# Patient Record
Sex: MALE | Race: BLACK OR AFRICAN AMERICAN | NOT HISPANIC OR LATINO | Employment: UNEMPLOYED | URBAN - METROPOLITAN AREA
[De-identification: names, ages, dates, MRNs, and addresses within clinical notes are randomized per-mention and may not be internally consistent; named-entity substitution may affect disease eponyms.]

---

## 2024-09-24 ENCOUNTER — APPOINTMENT (OUTPATIENT)
Dept: RADIOLOGY | Facility: HOSPITAL | Age: 74
End: 2024-09-24
Payer: COMMERCIAL

## 2024-09-24 ENCOUNTER — HOSPITAL ENCOUNTER (INPATIENT)
Facility: HOSPITAL | Age: 74
End: 2024-09-24
Attending: STUDENT IN AN ORGANIZED HEALTH CARE EDUCATION/TRAINING PROGRAM | Admitting: INTERNAL MEDICINE
Payer: COMMERCIAL

## 2024-09-24 DIAGNOSIS — F25.8 OTHER SCHIZOAFFECTIVE DISORDERS: ICD-10-CM

## 2024-09-24 DIAGNOSIS — T14.8XXA WOUND INFECTION: ICD-10-CM
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
## 2024-09-24 DIAGNOSIS — R78.81 GRAM-NEGATIVE BACTEREMIA: ICD-10-CM

## 2024-09-24 DIAGNOSIS — G47.00 INSOMNIA, UNSPECIFIED TYPE: ICD-10-CM

## 2024-09-24 DIAGNOSIS — E11.9 TYPE 2 DIABETES MELLITUS WITHOUT COMPLICATION, WITHOUT LONG-TERM CURRENT USE OF INSULIN (MULTI): ICD-10-CM

## 2024-09-24 DIAGNOSIS — I10 PRIMARY HYPERTENSION: ICD-10-CM

## 2024-09-24 DIAGNOSIS — N40.0 BENIGN PROSTATIC HYPERPLASIA, UNSPECIFIED WHETHER LOWER URINARY TRACT SYMPTOMS PRESENT: ICD-10-CM

## 2024-09-24 DIAGNOSIS — L08.9 WOUND INFECTION: ICD-10-CM

## 2024-09-24 DIAGNOSIS — K59.00 CONSTIPATION, UNSPECIFIED CONSTIPATION TYPE: ICD-10-CM

## 2024-09-24 DIAGNOSIS — S08.0XXA AVULSION OF SCALP, INITIAL ENCOUNTER: ICD-10-CM

## 2024-09-24 DIAGNOSIS — M54.9 BACK PAIN, UNSPECIFIED BACK LOCATION, UNSPECIFIED BACK PAIN LATERALITY, UNSPECIFIED CHRONICITY: ICD-10-CM

## 2024-09-24 LAB
ABO GROUP (TYPE) IN BLOOD: NORMAL
ABO GROUP (TYPE) IN BLOOD: NORMAL
ALBUMIN SERPL BCP-MCNC: 4.7 G/DL (ref 3.4–5)
ALBUMIN SERPL BCP-MCNC: 4.7 G/DL (ref 3.4–5)
ALP SERPL-CCNC: 74 U/L (ref 33–136)
ALP SERPL-CCNC: 74 U/L (ref 33–136)
ALT SERPL W P-5'-P-CCNC: 20 U/L (ref 10–52)
ALT SERPL W P-5'-P-CCNC: 20 U/L (ref 10–52)
ANION GAP SERPL CALC-SCNC: 21 MMOL/L (ref 10–20)
ANION GAP SERPL CALC-SCNC: 21 MMOL/L (ref 10–20)
ANTIBODY SCREEN: NORMAL
ANTIBODY SCREEN: NORMAL
AST SERPL W P-5'-P-CCNC: 52 U/L (ref 9–39)
AST SERPL W P-5'-P-CCNC: 52 U/L (ref 9–39)
BASOPHILS # BLD AUTO: 0.01 X10*3/UL (ref 0–0.1)
BASOPHILS # BLD AUTO: 0.01 X10*3/UL (ref 0–0.1)
BASOPHILS NFR BLD AUTO: 0.1 %
BASOPHILS NFR BLD AUTO: 0.1 %
BILIRUB SERPL-MCNC: 1 MG/DL (ref 0–1.2)
BILIRUB SERPL-MCNC: 1 MG/DL (ref 0–1.2)
BUN SERPL-MCNC: 12 MG/DL (ref 6–23)
BUN SERPL-MCNC: 12 MG/DL (ref 6–23)
CALCIUM SERPL-MCNC: 10.2 MG/DL (ref 8.6–10.6)
CALCIUM SERPL-MCNC: 10.2 MG/DL (ref 8.6–10.6)
CARDIAC TROPONIN I PNL SERPL HS: 101 NG/L (ref 0–53)
CARDIAC TROPONIN I PNL SERPL HS: 101 NG/L (ref 0–53)
CARDIAC TROPONIN I PNL SERPL HS: 83 NG/L (ref 0–53)
CARDIAC TROPONIN I PNL SERPL HS: 83 NG/L (ref 0–53)
CARDIAC TROPONIN I PNL SERPL HS: 90 NG/L (ref 0–53)
CARDIAC TROPONIN I PNL SERPL HS: 90 NG/L (ref 0–53)
CFT FORM KAOLIN IND BLD RES TEG: 1.4 MIN (ref 0.8–2.1)
CFT FORM KAOLIN IND BLD RES TEG: 1.4 MIN (ref 0.8–2.1)
CHLORIDE SERPL-SCNC: 96 MMOL/L (ref 98–107)
CHLORIDE SERPL-SCNC: 96 MMOL/L (ref 98–107)
CK SERPL-CCNC: 3189 U/L (ref 0–325)
CK SERPL-CCNC: 3189 U/L (ref 0–325)
CK SERPL-CCNC: 3593 U/L (ref 0–325)
CK SERPL-CCNC: 3593 U/L (ref 0–325)
CLOT ANGLE.KAOLIN INDUCED BLD RES TEG: 72.9 DEG (ref 63–78)
CLOT ANGLE.KAOLIN INDUCED BLD RES TEG: 72.9 DEG (ref 63–78)
CLOT INIT KAO IND P HEP NEUT BLD RES TEG: 5.5 MIN (ref 4.3–8.3)
CLOT INIT KAO IND P HEP NEUT BLD RES TEG: 5.5 MIN (ref 4.3–8.3)
CLOT INIT KAO IND P HEP NEUT BLD RES TEG: 6.2 MIN (ref 4.6–9.1)
CLOT INIT KAO IND P HEP NEUT BLD RES TEG: 6.2 MIN (ref 4.6–9.1)
CO2 SERPL-SCNC: 25 MMOL/L (ref 21–32)
CO2 SERPL-SCNC: 25 MMOL/L (ref 21–32)
CREAT SERPL-MCNC: 1.02 MG/DL (ref 0.5–1.3)
CREAT SERPL-MCNC: 1.02 MG/DL (ref 0.5–1.3)
EGFRCR SERPLBLD CKD-EPI 2021: 77 ML/MIN/1.73M*2
EGFRCR SERPLBLD CKD-EPI 2021: 77 ML/MIN/1.73M*2
EOSINOPHIL # BLD AUTO: 0.22 X10*3/UL (ref 0–0.4)
EOSINOPHIL # BLD AUTO: 0.22 X10*3/UL (ref 0–0.4)
EOSINOPHIL NFR BLD AUTO: 2.7 %
EOSINOPHIL NFR BLD AUTO: 2.7 %
ERYTHROCYTE [DISTWIDTH] IN BLOOD BY AUTOMATED COUNT: 13.2 % (ref 11.5–14.5)
ERYTHROCYTE [DISTWIDTH] IN BLOOD BY AUTOMATED COUNT: 13.2 % (ref 11.5–14.5)
EST. AVERAGE GLUCOSE BLD GHB EST-MCNC: 140 MG/DL
EST. AVERAGE GLUCOSE BLD GHB EST-MCNC: 140 MG/DL
ETHANOL SERPL-MCNC: <10 MG/DL
ETHANOL SERPL-MCNC: <10 MG/DL
FIBRINOGEN BLD CALC-MCNC: 361 MG/DL (ref 278–581)
FIBRINOGEN BLD CALC-MCNC: 361 MG/DL (ref 278–581)
FIBRINOGEN PPP-MCNC: 356 MG/DL (ref 200–400)
FIBRINOGEN PPP-MCNC: 356 MG/DL (ref 200–400)
GLUCOSE BLD MANUAL STRIP-MCNC: 150 MG/DL (ref 74–99)
GLUCOSE BLD MANUAL STRIP-MCNC: 150 MG/DL (ref 74–99)
GLUCOSE SERPL-MCNC: 158 MG/DL (ref 74–99)
GLUCOSE SERPL-MCNC: 158 MG/DL (ref 74–99)
HBA1C MFR BLD: 6.5 %
HBA1C MFR BLD: 6.5 %
HCT VFR BLD AUTO: 47.1 % (ref 41–52)
HCT VFR BLD AUTO: 47.1 % (ref 41–52)
HGB BLD-MCNC: 15.3 G/DL (ref 13.5–17.5)
HGB BLD-MCNC: 15.3 G/DL (ref 13.5–17.5)
IMM GRANULOCYTES # BLD AUTO: 0.03 X10*3/UL (ref 0–0.5)
IMM GRANULOCYTES # BLD AUTO: 0.03 X10*3/UL (ref 0–0.5)
IMM GRANULOCYTES NFR BLD AUTO: 0.4 % (ref 0–0.9)
IMM GRANULOCYTES NFR BLD AUTO: 0.4 % (ref 0–0.9)
INR PPP: 1.2 (ref 0.9–1.1)
INR PPP: 1.2 (ref 0.9–1.1)
LACTATE SERPL-SCNC: 2.3 MMOL/L (ref 0.4–2)
LACTATE SERPL-SCNC: 2.3 MMOL/L (ref 0.4–2)
LACTATE SERPL-SCNC: 2.9 MMOL/L (ref 0.4–2)
LACTATE SERPL-SCNC: 2.9 MMOL/L (ref 0.4–2)
LACTATE SERPL-SCNC: 6.9 MMOL/L (ref 0.4–2)
LACTATE SERPL-SCNC: 6.9 MMOL/L (ref 0.4–2)
LYMPHOCYTES # BLD AUTO: 0.55 X10*3/UL (ref 0.8–3)
LYMPHOCYTES # BLD AUTO: 0.55 X10*3/UL (ref 0.8–3)
LYMPHOCYTES NFR BLD AUTO: 6.7 %
LYMPHOCYTES NFR BLD AUTO: 6.7 %
MA KAOLIN BLD RES TEG: 55.9 MM (ref 52–69)
MA KAOLIN BLD RES TEG: 55.9 MM (ref 52–69)
MA KAOLIN+TF BLD RES TEG: 57.9 MM (ref 52–70)
MA KAOLIN+TF BLD RES TEG: 57.9 MM (ref 52–70)
MA TF IND+IIB-IIIA INH BLD RES TEG: 19.8 MM (ref 15–32)
MA TF IND+IIB-IIIA INH BLD RES TEG: 19.8 MM (ref 15–32)
MCH RBC QN AUTO: 27.9 PG (ref 26–34)
MCH RBC QN AUTO: 27.9 PG (ref 26–34)
MCHC RBC AUTO-ENTMCNC: 32.5 G/DL (ref 32–36)
MCHC RBC AUTO-ENTMCNC: 32.5 G/DL (ref 32–36)
MCV RBC AUTO: 86 FL (ref 80–100)
MCV RBC AUTO: 86 FL (ref 80–100)
MONOCYTES # BLD AUTO: 0.7 X10*3/UL (ref 0.05–0.8)
MONOCYTES # BLD AUTO: 0.7 X10*3/UL (ref 0.05–0.8)
MONOCYTES NFR BLD AUTO: 8.6 %
MONOCYTES NFR BLD AUTO: 8.6 %
NEUTROPHILS # BLD AUTO: 6.65 X10*3/UL (ref 1.6–5.5)
NEUTROPHILS # BLD AUTO: 6.65 X10*3/UL (ref 1.6–5.5)
NEUTROPHILS NFR BLD AUTO: 81.5 %
NEUTROPHILS NFR BLD AUTO: 81.5 %
NRBC BLD-RTO: 0 /100 WBCS (ref 0–0)
NRBC BLD-RTO: 0 /100 WBCS (ref 0–0)
PLATELET # BLD AUTO: 188 X10*3/UL (ref 150–450)
PLATELET # BLD AUTO: 188 X10*3/UL (ref 150–450)
POTASSIUM SERPL-SCNC: 4.8 MMOL/L (ref 3.5–5.3)
POTASSIUM SERPL-SCNC: 4.8 MMOL/L (ref 3.5–5.3)
PROT SERPL-MCNC: 8.8 G/DL (ref 6.4–8.2)
PROT SERPL-MCNC: 8.8 G/DL (ref 6.4–8.2)
PROTHROMBIN TIME: 13.9 SECONDS (ref 9.8–12.8)
PROTHROMBIN TIME: 13.9 SECONDS (ref 9.8–12.8)
RBC # BLD AUTO: 5.48 X10*6/UL (ref 4.5–5.9)
RBC # BLD AUTO: 5.48 X10*6/UL (ref 4.5–5.9)
RH FACTOR (ANTIGEN D): NORMAL
RH FACTOR (ANTIGEN D): NORMAL
SODIUM SERPL-SCNC: 137 MMOL/L (ref 136–145)
SODIUM SERPL-SCNC: 137 MMOL/L (ref 136–145)
WBC # BLD AUTO: 8.2 X10*3/UL (ref 4.4–11.3)
WBC # BLD AUTO: 8.2 X10*3/UL (ref 4.4–11.3)

## 2024-09-24 PROCEDURE — 99222 1ST HOSP IP/OBS MODERATE 55: CPT

## 2024-09-24 PROCEDURE — 82077 ASSAY SPEC XCP UR&BREATH IA: CPT | Performed by: STUDENT IN AN ORGANIZED HEALTH CARE EDUCATION/TRAINING PROGRAM

## 2024-09-24 PROCEDURE — 72125 CT NECK SPINE W/O DYE: CPT

## 2024-09-24 PROCEDURE — 36415 COLL VENOUS BLD VENIPUNCTURE: CPT | Performed by: STUDENT IN AN ORGANIZED HEALTH CARE EDUCATION/TRAINING PROGRAM

## 2024-09-24 PROCEDURE — 2500000004 HC RX 250 GENERAL PHARMACY W/ HCPCS (ALT 636 FOR OP/ED)

## 2024-09-24 PROCEDURE — 70450 CT HEAD/BRAIN W/O DYE: CPT

## 2024-09-24 PROCEDURE — 71045 X-RAY EXAM CHEST 1 VIEW: CPT

## 2024-09-24 PROCEDURE — 85025 COMPLETE CBC W/AUTO DIFF WBC: CPT | Performed by: STUDENT IN AN ORGANIZED HEALTH CARE EDUCATION/TRAINING PROGRAM

## 2024-09-24 PROCEDURE — 82947 ASSAY GLUCOSE BLOOD QUANT: CPT

## 2024-09-24 PROCEDURE — G0390 TRAUMA RESPONS W/HOSP CRITI: HCPCS

## 2024-09-24 PROCEDURE — 72170 X-RAY EXAM OF PELVIS: CPT

## 2024-09-24 PROCEDURE — 73130 X-RAY EXAM OF HAND: CPT | Mod: LT

## 2024-09-24 PROCEDURE — 83605 ASSAY OF LACTIC ACID: CPT

## 2024-09-24 PROCEDURE — 99291 CRITICAL CARE FIRST HOUR: CPT | Performed by: STUDENT IN AN ORGANIZED HEALTH CARE EDUCATION/TRAINING PROGRAM

## 2024-09-24 PROCEDURE — 73110 X-RAY EXAM OF WRIST: CPT | Mod: LT

## 2024-09-24 PROCEDURE — 85384 FIBRINOGEN ACTIVITY: CPT | Performed by: STUDENT IN AN ORGANIZED HEALTH CARE EDUCATION/TRAINING PROGRAM

## 2024-09-24 PROCEDURE — 80053 COMPREHEN METABOLIC PANEL: CPT | Performed by: STUDENT IN AN ORGANIZED HEALTH CARE EDUCATION/TRAINING PROGRAM

## 2024-09-24 PROCEDURE — 36600 WITHDRAWAL OF ARTERIAL BLOOD: CPT | Performed by: STUDENT IN AN ORGANIZED HEALTH CARE EDUCATION/TRAINING PROGRAM

## 2024-09-24 PROCEDURE — 82550 ASSAY OF CK (CPK): CPT | Performed by: STUDENT IN AN ORGANIZED HEALTH CARE EDUCATION/TRAINING PROGRAM

## 2024-09-24 PROCEDURE — 74176 CT ABD & PELVIS W/O CONTRAST: CPT

## 2024-09-24 PROCEDURE — 84484 ASSAY OF TROPONIN QUANT: CPT | Performed by: STUDENT IN AN ORGANIZED HEALTH CARE EDUCATION/TRAINING PROGRAM

## 2024-09-24 PROCEDURE — 96360 HYDRATION IV INFUSION INIT: CPT

## 2024-09-24 PROCEDURE — 86901 BLOOD TYPING SEROLOGIC RH(D): CPT | Performed by: STUDENT IN AN ORGANIZED HEALTH CARE EDUCATION/TRAINING PROGRAM

## 2024-09-24 PROCEDURE — 2550000001 HC RX 255 CONTRASTS: Performed by: STUDENT IN AN ORGANIZED HEALTH CARE EDUCATION/TRAINING PROGRAM

## 2024-09-24 PROCEDURE — 85347 COAGULATION TIME ACTIVATED: CPT | Performed by: STUDENT IN AN ORGANIZED HEALTH CARE EDUCATION/TRAINING PROGRAM

## 2024-09-24 PROCEDURE — 82550 ASSAY OF CK (CPK): CPT

## 2024-09-24 PROCEDURE — 84484 ASSAY OF TROPONIN QUANT: CPT

## 2024-09-24 PROCEDURE — 2500000002 HC RX 250 W HCPCS SELF ADMINISTERED DRUGS (ALT 637 FOR MEDICARE OP, ALT 636 FOR OP/ED)

## 2024-09-24 PROCEDURE — 99291 CRITICAL CARE FIRST HOUR: CPT | Mod: 25 | Performed by: STUDENT IN AN ORGANIZED HEALTH CARE EDUCATION/TRAINING PROGRAM

## 2024-09-24 PROCEDURE — 72131 CT LUMBAR SPINE W/O DYE: CPT | Mod: RCN

## 2024-09-24 PROCEDURE — 36415 COLL VENOUS BLD VENIPUNCTURE: CPT

## 2024-09-24 PROCEDURE — 2500000004 HC RX 250 GENERAL PHARMACY W/ HCPCS (ALT 636 FOR OP/ED): Performed by: NURSE ANESTHETIST, CERTIFIED REGISTERED

## 2024-09-24 PROCEDURE — 1200000002 HC GENERAL ROOM WITH TELEMETRY DAILY

## 2024-09-24 PROCEDURE — 83605 ASSAY OF LACTIC ACID: CPT | Performed by: STUDENT IN AN ORGANIZED HEALTH CARE EDUCATION/TRAINING PROGRAM

## 2024-09-24 PROCEDURE — 83036 HEMOGLOBIN GLYCOSYLATED A1C: CPT

## 2024-09-24 PROCEDURE — 2500000001 HC RX 250 WO HCPCS SELF ADMINISTERED DRUGS (ALT 637 FOR MEDICARE OP)

## 2024-09-24 PROCEDURE — 72128 CT CHEST SPINE W/O DYE: CPT | Mod: RCN

## 2024-09-24 PROCEDURE — 96361 HYDRATE IV INFUSION ADD-ON: CPT

## 2024-09-24 PROCEDURE — 85610 PROTHROMBIN TIME: CPT | Performed by: STUDENT IN AN ORGANIZED HEALTH CARE EDUCATION/TRAINING PROGRAM

## 2024-09-24 RX ORDER — ASPIRIN 81 MG/1
81 TABLET ORAL DAILY
Status: DISCONTINUED | OUTPATIENT
Start: 2024-09-24 | End: 2024-10-05 | Stop reason: HOSPADM

## 2024-09-24 RX ORDER — ACETAMINOPHEN 325 MG/1
975 TABLET ORAL ONCE
Status: COMPLETED | OUTPATIENT
Start: 2024-09-24 | End: 2024-09-24

## 2024-09-24 RX ORDER — INSULIN LISPRO 100 [IU]/ML
0-5 INJECTION, SOLUTION INTRAVENOUS; SUBCUTANEOUS
Status: DISCONTINUED | OUTPATIENT
Start: 2024-09-24 | End: 2024-10-05 | Stop reason: HOSPADM

## 2024-09-24 RX ORDER — DEXTROSE 50 % IN WATER (D50W) INTRAVENOUS SYRINGE
25
Status: DISCONTINUED | OUTPATIENT
Start: 2024-09-24 | End: 2024-10-05 | Stop reason: HOSPADM

## 2024-09-24 RX ORDER — TRAZODONE HYDROCHLORIDE 50 MG/1
100 TABLET ORAL NIGHTLY
Status: DISCONTINUED | OUTPATIENT
Start: 2024-09-24 | End: 2024-10-05 | Stop reason: HOSPADM

## 2024-09-24 RX ORDER — HYDRALAZINE HYDROCHLORIDE 10 MG/1
10 TABLET, FILM COATED ORAL ONCE
Status: COMPLETED | OUTPATIENT
Start: 2024-09-24 | End: 2024-09-24

## 2024-09-24 RX ORDER — POLYETHYLENE GLYCOL 3350 17 G/17G
17 POWDER, FOR SOLUTION ORAL DAILY
Status: DISCONTINUED | OUTPATIENT
Start: 2024-09-24 | End: 2024-10-05 | Stop reason: HOSPADM

## 2024-09-24 RX ORDER — DEXTROSE 50 % IN WATER (D50W) INTRAVENOUS SYRINGE
12.5
Status: DISCONTINUED | OUTPATIENT
Start: 2024-09-24 | End: 2024-10-05 | Stop reason: HOSPADM

## 2024-09-24 RX ORDER — TAMSULOSIN HYDROCHLORIDE 0.4 MG/1
0.4 CAPSULE ORAL DAILY
Status: DISCONTINUED | OUTPATIENT
Start: 2024-09-24 | End: 2024-10-05 | Stop reason: HOSPADM

## 2024-09-24 RX ORDER — SODIUM CHLORIDE 9 MG/ML
100 INJECTION, SOLUTION INTRAVENOUS CONTINUOUS
Status: DISCONTINUED | OUTPATIENT
Start: 2024-09-24 | End: 2024-09-30

## 2024-09-24 RX ORDER — ENOXAPARIN SODIUM 100 MG/ML
40 INJECTION SUBCUTANEOUS EVERY 24 HOURS
Status: DISCONTINUED | OUTPATIENT
Start: 2024-09-24 | End: 2024-10-05 | Stop reason: HOSPADM

## 2024-09-24 RX ORDER — OXYCODONE HYDROCHLORIDE 5 MG/1
5 TABLET ORAL EVERY 4 HOURS PRN
Status: DISCONTINUED | OUTPATIENT
Start: 2024-09-24 | End: 2024-09-28

## 2024-09-24 RX ORDER — ACETAMINOPHEN 325 MG/1
650 TABLET ORAL EVERY 8 HOURS PRN
Status: DISCONTINUED | OUTPATIENT
Start: 2024-09-24 | End: 2024-10-05 | Stop reason: HOSPADM

## 2024-09-24 ASSESSMENT — ENCOUNTER SYMPTOMS
DIZZINESS: 0
DIFFICULTY URINATING: 0
FEVER: 0
NAUSEA: 0
FATIGUE: 0
FLANK PAIN: 0
EYE PAIN: 0
VOMITING: 0
HEMATURIA: 0
ABDOMINAL PAIN: 0
SORE THROAT: 0
NERVOUS/ANXIOUS: 0
ACTIVITY CHANGE: 0
HEADACHES: 0
WOUND: 0
COUGH: 0
PHOTOPHOBIA: 0
NECK PAIN: 0
SHORTNESS OF BREATH: 0
NECK STIFFNESS: 0

## 2024-09-24 ASSESSMENT — LIFESTYLE VARIABLES
TOTAL SCORE: 0
EVER HAD A DRINK FIRST THING IN THE MORNING TO STEADY YOUR NERVES TO GET RID OF A HANGOVER: NO
HAVE YOU EVER FELT YOU SHOULD CUT DOWN ON YOUR DRINKING: NO
HAVE PEOPLE ANNOYED YOU BY CRITICIZING YOUR DRINKING: NO
EVER FELT BAD OR GUILTY ABOUT YOUR DRINKING: NO

## 2024-09-24 ASSESSMENT — PAIN SCALES - GENERAL
PAINLEVEL_OUTOF10: 8
PAINLEVEL_OUTOF10: 6
PAINLEVEL_OUTOF10: 7

## 2024-09-24 ASSESSMENT — COLUMBIA-SUICIDE SEVERITY RATING SCALE - C-SSRS
6. HAVE YOU EVER DONE ANYTHING, STARTED TO DO ANYTHING, OR PREPARED TO DO ANYTHING TO END YOUR LIFE?: NO
1. IN THE PAST MONTH, HAVE YOU WISHED YOU WERE DEAD OR WISHED YOU COULD GO TO SLEEP AND NOT WAKE UP?: NO
2. HAVE YOU ACTUALLY HAD ANY THOUGHTS OF KILLING YOURSELF?: NO

## 2024-09-24 ASSESSMENT — PAIN - FUNCTIONAL ASSESSMENT
PAIN_FUNCTIONAL_ASSESSMENT: 0-10
PAIN_FUNCTIONAL_ASSESSMENT: 0-10

## 2024-09-24 NOTE — ED PROCEDURE NOTE
Procedure  Critical Care    Performed by: Mina Covington MD  Authorized by: Mina Covington MD    Critical care provider statement:     Critical care time (minutes):  35    Critical care time was exclusive of:  Teaching time and separately billable procedures and treating other patients    Critical care was necessary to treat or prevent imminent or life-threatening deterioration of the following conditions:  CNS failure or compromise and trauma    Critical care was time spent personally by me on the following activities:  Blood draw for specimens, ordering and performing treatments and interventions, ordering and review of radiographic studies, ordering and review of laboratory studies, pulse oximetry, re-evaluation of patient's condition, review of old charts, development of treatment plan with patient or surrogate, discussions with consultants, evaluation of patient's response to treatment and examination of patient  Comments:      Patient activated as a full trauma due to concern for depressed skull fracture.  Required panCT and discussion with trauma team.               Mina Covington MD  09/24/24 2422

## 2024-09-24 NOTE — ED TRIAGE NOTES
Pt arrives via [facility] after he had an unwitnessed fall at home yesterday. The pt had been laying on the ground since last night at 9 pm and he was unable to get up. A neighbor found him on the ground this morning altered with a obvious deformity to the posterior skull. Pt is not currently on any blood thinners.

## 2024-09-24 NOTE — ED PROVIDER NOTES
Emergency Department Provider Note        History of Present Illness     History provided by: Patient and EMS  Limitations to History: Trauma Activation    HPI:  Onehundredseventeen Trauma Riaz is a 74 y.o. male presenting to the ED as a Full Trauma Activation after fall. Patient reports that he fell off of a table. Patient had an unwitnessed fall the night before and was found laying on the ground. Patient was found down and appeared to have a obvious posterior scalp deformity. Patient was not on blood thinners.     Physical Exam   Arrival Vitals:  T 36.4 °C (97.6 °F)  HR 97  /82  RR 14  O2 95 % None (Room air)    Primary Survey:  Airway: Intact & patent  Breathing: Equal breath sounds bilaterally  Circulation: 2+ radial and DP pulses bilaterally  Disability: GCS 15.  Gross motor and sensation intact in the bilateral upper and lower extremities.  Exposure: Patient fully exposed, warm blankets applied    Secondary Survey:    Head: Depressed posterior scalp. No cephalohematoma.  Eye: Pupils equal, round, and reactive to light. Gaze is conjugate. No orbital ridge bony step-offs, or tenderness.  ENT:   Midface is stable.  No mandibular tenderness or dental malocclusion's.  There is no nasal bone tenderness or deformity.  No epistaxis.  No blood or CSF drainage and external auditory canals.  No intraoral lesions.  Neck: Cervical collar in place. There is no C-spine midline tenderness to palpation, step-offs, or deformities.  Trachea is midline.  Chest: Clear to auscultation bilaterally, no chest wall tenderness palpation, crepitus, flail segments noted.  No bruising or abrasions noted to the anterior chest wall.  Cardiovascular: Regular rate, rhythm  Abdomen: Soft, nontender, nondistended.  No bruising or lacerations noted.  Not peritonitic.  Pelvis: Stable to compression  : Normal external genitalia, no blood at the urethral meatus  Back/spine: No midline T or L-spine tenderness palpation, step-offs, or  deformities.  No lacerations, abrasions, or bruising noted.  Extremities: No gross bony deformities, no bony tenderness palpation.  Full range of motion all in 4 extremities.  Skin: Abrasions to the left shoulder.  Neuro: Alert and oriented to person, place, time.  Face symmetric, speech fluent.  Gross motor and sensory function intact in the bilateral upper and lower extremities.    Medical Decision Making & ED Course   Medical Decision Makin y.o. male presenting to the ED as a Full Trauma Activation after being found down.  On arrival to the ED, the patient was immediately brought to the resuscitation bay.  Patient was seen and evaluated with trauma surgery at the bedside. Patient was placed on the monitors. IV access was obtained. Patient was placed in a c collar in place. Primary and secondary survey were notable for abrasions to the left shoulder    Chest xray was obtained in the trauma bay and was independently reviewed which showed no pneumothorax.  Pelvis xray was obtained in the trauma bay and was independently reviewed which showed acute fracture.  Patient obtain lab work including a CK given the time down.  Patient had a CT imaging of the head as well as pan scan which were negative for traumatic injury.  Patient had x-rays of the left hand and wrist as well because of the pain.  Trauma surgery signed off of the patient.  Patient was given a liter of fluids had improvement in the lactate.  Given the elevated creatinine and elevated lactate in setting of dehydration and need for PT OT.  Patient will be admitted to medicine.    ----      EKG Independent Interpretation: EKG not obtained    Independent Result Review and Interpretation: Relevant laboratory and radiographic results were reviewed and independently interpreted by myself.  As necessary, they are commented on in the ED Course.    Social Determinants of Health which Significantly Impact Care: None identified     Chronic conditions affecting  the patient's care: As documented above in [facility]    The patient was discussed with the following consultants/services: Trauma Surgery regarding management and disposition    Care Considerations: As documented above in [facility]    ED Course:  ED Course as of 09/24/24 1554   Tue Sep 24, 2024   1055 XR pelvis 1-2 views  No acute fracture on my interpretation [FN]   1056 XR chest 1 view  On my interpretation chest x-ray shows no pneumothorax or hemothorax.  Trachea is midline. [FN]   1300 Creatinine: 1.02 [FN]      ED Course User Index  [FN] Mina Covington MD         Diagnoses as of 09/24/24 1554   Fall, initial encounter       Disposition   As a result of their workup, the patient will require admission to the hospital.  The patient was informed of his diagnosis.  The patient was given the opportunity to ask questions and I answered them. The patient agreed to be admitted to the hospital.    Procedures   Procedures    Patient seen and discussed with ED attending physician.    Deborah Martinez MD  Emergency Medicine     Deborah Martinez MD  Resident  09/24/24 8401

## 2024-09-24 NOTE — PROGRESS NOTES
Attending Attestation Note    Onehundredseventeen Trauma Riaz is a 74 y.o. male who history of hypertension, hyperlipidemia, diabetes presenting with fall yesterday at 9 PM.  Standing on a table, fell down hitting the back of his head.  Found on the ground by his roommate today.  Recalls the entire incident and denies LOC.  EMS noted the patient does have a depression of the back of the skull.  No preceding lightheadedness, chest pain, shortness of breath.  No significant complaints right now.  Says he is not sure why he could not get up    Exam  Visit Vitals  /82   Pulse 96   Temp 36.4 °C (97.6 °F)   Resp 20     NAD, vital signs stable  Posterior skull depression, abrasion over the left face, no facial bone tenderness  No C/T/L-spine tenderness, step-off, deformity.  Arrived in c-collar in place  No chest wall or abdominal tenderness  Stable pelvis  No extremity deformity, pulses intact 4 extremities, abrasion on the left anterior tibia   GCS 15 following commands alert and oriented x 3    MDM  74-year-old male presenting with fall with posterior skull depression.  AAOx3 right now.  Recalls the entire incident.  Appears to be a mechanical fall however given significant mechanism of injury full trauma was activated.  Plan for pan scan to evaluate for other injuries.  Will get CT angio of the head and neck given concern for skull fracture and likely intracranial bleeding  Rule out electrolyte/metabolic/renal abnormalities, anemia, leukocytosis  Will check CPK for rhabdomyolysis and give empiric IV fluids  Does not recall last tetanus we will update  Wound care for abrasions        MDM Complexity    History provided by: Patient  Limitations to History: None  External Records Reviewed: Most recent office visit/DC sum    Social Determinants of Health which Significantly Impact Care:   Was unable to get help overnight when he fell   Social Determinants of Health     Tobacco Use: Not on file   Alcohol Use: Not on  file   Financial Resource Strain: Not on file   Food Insecurity: Not on file   Transportation Needs: Not on file   Physical Activity: Not on file   Stress: Not on file   Social Connections: Not on file   Depression: Not on file   Housing Stability: Not on file   Utilities: Not on file   Digital Equity: Not on file   Health Literacy: Not on file       EKG Independent Interpretation: See ED Course  Independent Result Review and Interpretation: See ED course  Chronic conditions affecting the patient's care: As documented in HPI, MDM, and ED course  The patient was discussed with the following consultants/services: trauma surgery, see ed course         Mina Covington MD  Emergency Medicine

## 2024-09-24 NOTE — H&P
"MEDICINE ADMISSION NOTE    History of Present Illness  Onehundredseventeen Trauma Riaz ([place]) is a 74 y.o. male with PMHx of HTN, HLD, DM, cirrhosis 2/2 treated Hep C (Tx 6442-9792), BPH, schizoaffective disorder and prostate CA s/p EBRT presenting to the ED s/p mechanical fall leading to trauma activation. Pt reports he was standing on a table when he fell backwards hitting his head last night around 9 PM.  Patient was unable to get up on his own was found down by his roommate this morning. Pt reports being down for 12 hours and was unable to get up on his own.  The patient denies any loss of consciousness, lightheadedness or dizziness prior to the fall. Reports having pain in his back and his head.  Patient noted to have an obvious deformity in the back of his skull.  His lip is also swollen. Patient without any signs of confusion and able to recall the fall.      Patient denies any fever, chills, shortness of breath, chest pain, abdominal pain, N/V/C/D, lower extremity pain/swelling.    ED Course:  BP (!) 164/104   Pulse 98   Temp 36.4 °C (97.6 °F)   Resp 16   Ht 1.803 m (5' 11\")   Wt 94.3 kg (208 lb)   SpO2 95%   BMI 29.01 kg/m²      Labs:  Labs Reviewed   CBC WITH AUTO DIFFERENTIAL - Abnormal       Result Value    WBC 8.2      nRBC 0.0      RBC 5.48      Hemoglobin 15.3      Hematocrit 47.1      MCV 86      MCH 27.9      MCHC 32.5      RDW 13.2      Platelets 188      Neutrophils % 81.5      Immature Granulocytes %, Automated 0.4      Lymphocytes % 6.7      Monocytes % 8.6      Eosinophils % 2.7      Basophils % 0.1      Neutrophils Absolute 6.65 (*)     Immature Granulocytes Absolute, Automated 0.03      Lymphocytes Absolute 0.55 (*)     Monocytes Absolute 0.70      Eosinophils Absolute 0.22      Basophils Absolute 0.01     COMPREHENSIVE METABOLIC PANEL - Abnormal    Glucose 158 (*)     Sodium 137      Potassium 4.8      Chloride 96 (*)     Bicarbonate 25      Anion Gap 21 (*)     Urea " Nitrogen 12      Creatinine 1.02      eGFR 77      Calcium 10.2      Albumin 4.7      Alkaline Phosphatase 74      Total Protein 8.8 (*)     AST 52 (*)     Bilirubin, Total 1.0      ALT 20     LACTATE - Abnormal    Lactate 6.9 (*)     Narrative:     Venipuncture immediately after or during the administration of Metamizole may lead to falsely low results. Testing should be performed immediately prior to Metamizole dosing.   PROTIME-INR - Abnormal    Protime 13.9 (*)     INR 1.2 (*)    TROPONIN I, HIGH SENSITIVITY - Abnormal    Troponin I, High Sensitivity (CMC) 83 (*)     Narrative:     Less than 99th percentile of normal range cutoff-  Female and children under 18 years old <35 ng/L; Male <54 ng/L: Negative  Repeat testing should be performed if clinically indicated.     Female and children under 18 years old  ng/L; Male  ng/L:  Consistent with possible cardiac damage and possible increased clinical   risk. Serial measurements may help to assess extent of myocardial damage.     >120 ng/L: Consistent with cardiac damage, increased clinical risk and  myocardial infarction. Serial measurements may help assess extent of   myocardial damage.      NOTE: Children less than 1 year old may have higher baseline troponin   levels and results should be interpreted in conjunction with the overall   clinical context.    NOTE: Troponin I testing is performed using a different   testing methodology at [facility] than at other   [facility]. Direct result comparisons should only   be made within the same method.     CREATINE KINASE - Abnormal    Creatine Kinase 3,189 (*)    LACTATE - Abnormal    Lactate 2.9 (*)     Narrative:     Venipuncture immediately after or during the administration of Metamizole may lead to falsely low results. Testing should be performed immediately prior to Metamizole dosing.   ALCOHOL - Normal    Alcohol <10     FIBRINOGEN - Normal    Fibrinogen 356     THROMBOELASTOGRAPH  CLOTTING GLOBAL PROFILE - Normal    R (Reaction Time) K 6.2      K (Clot Kinetics) 1.4      ANGLE 72.9      MA (Max Amplitude) K 55.9      R (Reaction Time) KH 5.5      MA (Max Amplitude) RT 57.9      MA ( Rachel Amplitude) FF 19.8      FLEV 361     TYPE AND SCREEN    ABO TYPE B      Rh TYPE POS      ANTIBODY SCREEN NEG     FIBRINOGEN   TROPONIN I, HIGH SENSITIVITY   LACTATE          Imaging:  XR wrist left 3+ views   Final Result   No definitive fracture.             MACRO:   None        Signed by: Doc Smith 9/24/2024 2:47 PM   Dictation workstation:   [id number]      XR hand left 3+ views   Final Result   No definitive fracture.             MACRO:   None        Signed by: Doc Smith 9/24/2024 2:39 PM   Dictation workstation:   [id number]      CT thoracic spine wo IV contrast   Final Result   No acute intracranial abnormality or calvarial fracture was   identified with significant scalp edema on a posttraumatic basis. No   acute fracture or posttraumatic subluxation of the vertebral column   was identified with findings as above.        MACRO:   None        Signed by: Mazin Jaimes 9/24/2024 11:39 AM   Dictation workstation:   [id number]      CT lumbar spine wo IV contrast   Final Result   No acute intracranial abnormality or calvarial fracture was   identified with significant scalp edema on a posttraumatic basis. No   acute fracture or posttraumatic subluxation of the vertebral column   was identified with findings as above.        MACRO:   None        Signed by: Mazin Jaimes 9/24/2024 11:39 AM   Dictation workstation:   [id number]      CT chest abdomen pelvis wo IV contrast   Final Result   1. No evidence of acute traumatic injury given noncontrast technique.   2. Probable renal cysts are incompletely assessed in the current   study. Further characterization by ultrasound on a routine outpatient   basis is recommended.        Signed by: Jericho Bearden 9/24/2024 11:37 AM   Dictation workstation:    [id number]      CT cervical spine wo IV contrast   Final Result   No acute intracranial abnormality or calvarial fracture was   identified with significant scalp edema on a posttraumatic basis. No   acute fracture or posttraumatic subluxation of the vertebral column   was identified with findings as above.        MACRO:   None        Signed by: Mazin Jaimes 9/24/2024 11:39 AM   Dictation workstation:   [id number]      CT head W O contrast trauma protocol   Final Result   No acute intracranial abnormality or calvarial fracture was   identified with significant scalp edema on a posttraumatic basis. No   acute fracture or posttraumatic subluxation of the vertebral column   was identified with findings as above.        MACRO:   None        Signed by: Mazin Jaimes 9/24/2024 11:39 AM   Dictation workstation:   [id number]      XR chest 1 view   Final Result   1. Prominent bilateral bronchovascular markings. Otherwise, no acute   cardiopulmonary process.        I personally reviewed the images/study and I agree with the findings   as stated by Rachid Calderon MD. This study was interpreted at   [place], [place].        MACRO:   None        Signed by: Doc Smith 9/24/2024 11:15 AM   Dictation workstation:   [id number]      XR pelvis 1-2 views   Final Result   No evidence of acute fracture or dislocation.        I personally reviewed the images/study and I agree with the findings   as stated by Rachid Calderon MD. This study was interpreted at   [place], [place].        Signed by: Doc Smith 9/24/2024 11:13 AM   Dictation workstation:   [id number]           ED Interventions:  Medications   iohexol (OMNIPaque) 350 mg iodine/mL solution 150 mL (150 mL intravenous Not Given 9/24/24 1052)   sodium chloride 0.9% infusion (50 mL/hr intravenous New Bag 9/24/24 1335)   sodium chloride 0.9 % bolus (1,000 mL intravenous New Bag 9/24/24  1052)   acetaminophen (Tylenol) tablet 975 mg (975 mg oral Given 9/24/24 1332)     Past Medical History  No past medical history on file.    Surgical History  No past surgical history on file.    Social History  He has no history on file for tobacco use, alcohol use, and drug use.    Family History  No family history on file.     Allergies  Patient has no known allergies.    Home Medications   No current outpatient medications      Objective   Visit Vitals  BP (!) 164/104   Pulse 98   Temp 36.4 °C (97.6 °F)   Resp 16      Physical Exam   Constitutional: No acute distress, cooperative  HEENT: NC/AT, EOMI, swelling in the lower lip, pressure wound deformity in back of skull   Cardiovascular: RRR, normal S1/S2  Pulmonary: Clear to auscultation b/l, no increased work of breathing  GI: Soft, non-tender, non-distended, normoactive bowel sounds  Lower extremities: Warm and well perfused, no lower extremity edema  Neuro: A&O x3, able to move all 4 extremities spontaneously, no focal deficits   Psych: Appropriate mood and affect      Medications  Scheduled medications  iohexol, 150 mL, intravenous, Once in imaging      Continuous medications  sodium chloride 0.9%, 50 mL/hr, Last Rate: 50 mL/hr (09/24/24 1335)      PRN medications        Assessment/Plan   Onehundredseventeen Trauma Riaz ([place]) is a 74 y.o. male with PMHx of HTN, HLD, DM, cirrhosis 2/2 treated Hep C (Tx 5075-5278), BPH, schizoaffective disorder and prostate CA s/p EBRT presenting to the s/p mechanical fall leading to trauma activation.  In the ED, extensive imaging was completed and resulted negative for any acute fractures or intracranial bleeding.  Labs notable for CK elevated to 3189, lactate elevated to 6.9 and down trended to 2.9 status post IV fluids.  Patient likely having rhabdomyolysis after being found down for an extensive duration s/p mechanical fall. Will trend CK, lactate, and continue with IVF. Will consult PT/OT to assess  functional status.     #s/p Mechanical Fall  #Skull deformity   ::fell last night and was down all night until found by his roommate  ::Trauma activated in the ED, extensive imaging completed and neg for acute findings   ::AAO x3 with no focal deficits  ::Trop 83 -> 90, ddx demand ischemia vs Type II MI   -Trauma signed off    -Tylenlol and oxycodone 5 mg for pain   -Will consult PT/OT to assess functional status and consideration for SNF placement   -Trend Trop to peak   -Consult wound care in the AM   -CTM     #c/f rhabdomyolysis   ::CK elevated to 3,189, lactate 6.9 -> 2.9 s/p IVF   -C/w NS at 50 ml/hr   -Trend CK and lactate     #HTN   #HLD  ::On lovastatin, triamterene-hydroCHLOROthiazide 37.5-25 mg daily, and ASA 81mg   -C/w ASA 81 mg daily   -Holding statin iso c/f rhabo   -Will give Hydral 10mg once for hypertension     #DM   -Check A1c  -Holding home Metformin 500mg BID   -Start SSI #1    #Schizoaffective disorder  #MDD   #insomnia   ::On propanolol 10 mg, Risperidone 2.5 mg tablet and trazodone 100 mg tablet   -C/w Risperidone 2.5 mg and Trazodone 100mg     #BPH  -C/w Tamsulosin     F : PRN  E: PRN  N: regular     DVT prophylaxis: Lovenox subq    Code Status: full code (confirmed on admission)   NOK: Extended Emergency Contact Information  Primary Emergency Contact: DESIREE GARIBAY  Mobile Phone: [phone]  Relation: Brother        Guerline Hoffman MD  Internal Medicine, PGY-3

## 2024-09-24 NOTE — H&P
[facility]  TRAUMA SERVICE - HISTORY AND PHYSICAL / CONSULT    Patient Name: Dilcia Mello  MRN: [id number]  Admit Date: 924  : 1950  AGE: 74 y.o.   GENDER: male  ==============================================================================  MECHANISM OF INJURY / CHIEF COMPLAINT:   Patient is an approximately 74 YOM who arrived as a full trauma activation after reported fall with +head strike and deformity of the skull.  Reportedly standing on table to reach a circuit breaker when he fell. On arrival patient is GCS 15, A&Ox4, reportedly remained down since 9pm  until he was found by his roommate.  LOC (yes/no?): Denies  Anticoagulant / Anti-platelet Rx? (for what dx?): Denies  Referring Facility Name (N/A for scene EMR run): N/A    INJURIES:   Fall    OTHER MEDICAL PROBLEMS:  HTN  HLD  DM    INCIDENTAL FINDINGS:  -    ==============================================================================  ADMISSION PLAN OF CARE:  Fall  No traumatic injuries on CT pan scan    Dispo: Given no traumatic injuries, trauma service will sign off at this time. Dispo per ED    Consultants notified (specialty, provider name, time): None    Patient seen and discussed with Dr. Cagle,    Maria T Palomo, CNP  Trauma Surgery  Floor: [id number] TICU: [id number]  Pager: [phone]    Total face to face time spent with patient of 35 minutes, with >50% of the time spent discussing plan of care/management, counseling/educating on disease processes, explaining results of diagnostic testing.      ==============================================================================  PAST MEDICAL HISTORY:   PMH:   No past medical history on file.      PSH:   No past surgical history on file.  FH:   No family history on file.  SOCIAL HISTORY:    Smoking:    Social History     Tobacco Use   Smoking Status Not on file   Smokeless Tobacco Not on file       Alcohol:    Social History     Substance  and Sexual Activity   Alcohol Use Not on file       Drug use: Denies illicit drug use    MEDICATIONS:   Prior to Admission medications    Not on File     ALLERGIES:   No Known Allergies    REVIEW OF SYSTEMS:  Review of Systems   Constitutional:  Negative for activity change, fatigue and fever.   HENT:  Negative for congestion, hearing loss and sore throat.    Eyes:  Negative for photophobia, pain and visual disturbance.   Respiratory:  Negative for cough and shortness of breath.    Cardiovascular:  Negative for chest pain.   Gastrointestinal:  Negative for abdominal pain, nausea and vomiting.   Genitourinary:  Negative for difficulty urinating, flank pain and hematuria.   Musculoskeletal:  Negative for neck pain and neck stiffness.   Skin:  Negative for wound.   Neurological:  Negative for dizziness, syncope and headaches.   Psychiatric/Behavioral:  Negative for self-injury and suicidal ideas. The patient is not nervous/anxious.      PHYSICAL EXAM:  PRIMARY SURVEY:  Airway  Airway is patent.     Breathing  Breathing is normal. Right breath sounds are normal. Left breath sounds are normal.     Circulation  Cardiac rhythm is regular. Rate is regular. There is no murmur present. There is no pericardial rub present.   Pulses  Radial: 2+ on the right; 2+ on the left.  Femoral: 2+ on the right; 2+ on the left.  Pedal: 2+ on the right; 2+ on the left.    Disability  [place] Coma Score  Eye:4   Verbal:5   Motor:6      15  Pupils  Right Pupil:   round and reactive      3 mm  Left Pupil:   round and reactive      3 mm     Motor Strength   strength:  5/5 on the right  5/5 on the left  Dorsiflex strength:  5/5 on the right  5/5 on the left  Plantarflex strength:  5/5 on the right  5/5 on the left  The patient does not have a sensory deficit.       SECONDARY SURVEY/PHYSICAL EXAM:  Physical Exam  HENT:      Head:      Comments: Depression of the occiput, no obvious laceration or abrasion     Nose: Nose normal.       Mouth/Throat:      Mouth: Mucous membranes are dry.   Eyes:      Conjunctiva/sclera: Conjunctivae normal.   Neck:      Comments: C collar in place, no step offs or deformities on exam  Cardiovascular:      Rate and Rhythm: Normal rate.      Pulses: Normal pulses.   Pulmonary:      Effort: Pulmonary effort is normal.   Abdominal:      Palpations: Abdomen is soft.   Musculoskeletal:         General: Normal range of motion.   Skin:     General: Skin is warm.      Capillary Refill: Capillary refill takes less than 2 seconds.      Comments: Superficial abrasion noted to right shoulder, superficial abrasion to luciano surface of left hand   Neurological:      Mental Status: He is oriented to person, place, and time.       IMAGING SUMMARY:  (summary of findings, not a copy of dictation)  CT Head/Face: No acute intracranial abnormality or calvarial fracture was identified with significant scalp edema on a posttraumatic basis.   CT C-Spine: No acute fracture or posttraumatic subluxation of the vertebral column was identified with findings as above.   CT Chest/Abd/Pelvis: No evidence of acute traumatic injury given noncontrast technique.   CXR/PXR: Prominent bilateral bronchovascular markings. Otherwise, no acute cardiopulmonary process.   Other(s):     LABS:  Results from last 7 days   Lab Units 09/24/24  1052   WBC AUTO x10*3/uL 8.2   HEMOGLOBIN g/dL 15.3   HEMATOCRIT % 47.1   PLATELETS AUTO x10*3/uL 188   NEUTROS PCT AUTO % 81.5   LYMPHS PCT AUTO % 6.7   MONOS PCT AUTO % 8.6   EOS PCT AUTO % 2.7     Results from last 7 days   Lab Units 09/24/24  1052   INR  1.2*     Results from last 7 days   Lab Units 09/24/24  1052   SODIUM mmol/L 137   POTASSIUM mmol/L 4.8   CHLORIDE mmol/L 96*   CO2 mmol/L 25   BUN mg/dL 12   CREATININE mg/dL 1.02   CALCIUM mg/dL 10.2   PROTEIN TOTAL g/dL 8.8*   BILIRUBIN TOTAL mg/dL 1.0   ALK PHOS U/L 74   ALT U/L 20   AST U/L 52*   GLUCOSE mg/dL 158*     Results from last 7 days   Lab Units  09/24/24  1052   BILIRUBIN TOTAL mg/dL 1.0           I have reviewed all laboratory and imaging results ordered/pertinent for this encounter.

## 2024-09-25 LAB
ALBUMIN SERPL BCP-MCNC: 3.9 G/DL (ref 3.4–5)
ALBUMIN SERPL BCP-MCNC: 3.9 G/DL (ref 3.4–5)
ANION GAP SERPL CALC-SCNC: 15 MMOL/L (ref 10–20)
ANION GAP SERPL CALC-SCNC: 15 MMOL/L (ref 10–20)
BASOPHILS # BLD AUTO: 0.02 X10*3/UL (ref 0–0.1)
BASOPHILS # BLD AUTO: 0.02 X10*3/UL (ref 0–0.1)
BASOPHILS NFR BLD AUTO: 0.3 %
BASOPHILS NFR BLD AUTO: 0.3 %
BUN SERPL-MCNC: 13 MG/DL (ref 6–23)
BUN SERPL-MCNC: 13 MG/DL (ref 6–23)
CALCIUM SERPL-MCNC: 8.9 MG/DL (ref 8.6–10.6)
CALCIUM SERPL-MCNC: 8.9 MG/DL (ref 8.6–10.6)
CARDIAC TROPONIN I PNL SERPL HS: 53 NG/L (ref 0–53)
CARDIAC TROPONIN I PNL SERPL HS: 53 NG/L (ref 0–53)
CARDIAC TROPONIN I PNL SERPL HS: 84 NG/L (ref 0–53)
CARDIAC TROPONIN I PNL SERPL HS: 84 NG/L (ref 0–53)
CHLORIDE SERPL-SCNC: 97 MMOL/L (ref 98–107)
CHLORIDE SERPL-SCNC: 97 MMOL/L (ref 98–107)
CK SERPL-CCNC: 3173 U/L (ref 0–325)
CK SERPL-CCNC: 3173 U/L (ref 0–325)
CO2 SERPL-SCNC: 27 MMOL/L (ref 21–32)
CO2 SERPL-SCNC: 27 MMOL/L (ref 21–32)
CREAT SERPL-MCNC: 1 MG/DL (ref 0.5–1.3)
CREAT SERPL-MCNC: 1 MG/DL (ref 0.5–1.3)
EGFRCR SERPLBLD CKD-EPI 2021: 79 ML/MIN/1.73M*2
EGFRCR SERPLBLD CKD-EPI 2021: 79 ML/MIN/1.73M*2
EOSINOPHIL # BLD AUTO: 0.01 X10*3/UL (ref 0–0.4)
EOSINOPHIL # BLD AUTO: 0.01 X10*3/UL (ref 0–0.4)
EOSINOPHIL NFR BLD AUTO: 0.2 %
EOSINOPHIL NFR BLD AUTO: 0.2 %
ERYTHROCYTE [DISTWIDTH] IN BLOOD BY AUTOMATED COUNT: 13.2 % (ref 11.5–14.5)
ERYTHROCYTE [DISTWIDTH] IN BLOOD BY AUTOMATED COUNT: 13.2 % (ref 11.5–14.5)
GLUCOSE BLD MANUAL STRIP-MCNC: 104 MG/DL (ref 74–99)
GLUCOSE BLD MANUAL STRIP-MCNC: 104 MG/DL (ref 74–99)
GLUCOSE BLD MANUAL STRIP-MCNC: 109 MG/DL (ref 74–99)
GLUCOSE BLD MANUAL STRIP-MCNC: 109 MG/DL (ref 74–99)
GLUCOSE BLD MANUAL STRIP-MCNC: 128 MG/DL (ref 74–99)
GLUCOSE BLD MANUAL STRIP-MCNC: 128 MG/DL (ref 74–99)
GLUCOSE BLD MANUAL STRIP-MCNC: 132 MG/DL (ref 74–99)
GLUCOSE BLD MANUAL STRIP-MCNC: 132 MG/DL (ref 74–99)
GLUCOSE SERPL-MCNC: 121 MG/DL (ref 74–99)
GLUCOSE SERPL-MCNC: 121 MG/DL (ref 74–99)
HCT VFR BLD AUTO: 41.7 % (ref 41–52)
HCT VFR BLD AUTO: 41.7 % (ref 41–52)
HGB BLD-MCNC: 14 G/DL (ref 13.5–17.5)
HGB BLD-MCNC: 14 G/DL (ref 13.5–17.5)
IMM GRANULOCYTES # BLD AUTO: 0.01 X10*3/UL (ref 0–0.5)
IMM GRANULOCYTES # BLD AUTO: 0.01 X10*3/UL (ref 0–0.5)
IMM GRANULOCYTES NFR BLD AUTO: 0.2 % (ref 0–0.9)
IMM GRANULOCYTES NFR BLD AUTO: 0.2 % (ref 0–0.9)
LACTATE SERPL-SCNC: 2 MMOL/L (ref 0.4–2)
LACTATE SERPL-SCNC: 2 MMOL/L (ref 0.4–2)
LYMPHOCYTES # BLD AUTO: 1.1 X10*3/UL (ref 0.8–3)
LYMPHOCYTES # BLD AUTO: 1.1 X10*3/UL (ref 0.8–3)
LYMPHOCYTES NFR BLD AUTO: 17.2 %
LYMPHOCYTES NFR BLD AUTO: 17.2 %
MAGNESIUM SERPL-MCNC: 2.06 MG/DL (ref 1.6–2.4)
MAGNESIUM SERPL-MCNC: 2.06 MG/DL (ref 1.6–2.4)
MCH RBC QN AUTO: 27.9 PG (ref 26–34)
MCH RBC QN AUTO: 27.9 PG (ref 26–34)
MCHC RBC AUTO-ENTMCNC: 33.6 G/DL (ref 32–36)
MCHC RBC AUTO-ENTMCNC: 33.6 G/DL (ref 32–36)
MCV RBC AUTO: 83 FL (ref 80–100)
MCV RBC AUTO: 83 FL (ref 80–100)
MONOCYTES # BLD AUTO: 0.77 X10*3/UL (ref 0.05–0.8)
MONOCYTES # BLD AUTO: 0.77 X10*3/UL (ref 0.05–0.8)
MONOCYTES NFR BLD AUTO: 12.1 %
MONOCYTES NFR BLD AUTO: 12.1 %
NEUTROPHILS # BLD AUTO: 4.47 X10*3/UL (ref 1.6–5.5)
NEUTROPHILS # BLD AUTO: 4.47 X10*3/UL (ref 1.6–5.5)
NEUTROPHILS NFR BLD AUTO: 70 %
NEUTROPHILS NFR BLD AUTO: 70 %
NRBC BLD-RTO: 0 /100 WBCS (ref 0–0)
NRBC BLD-RTO: 0 /100 WBCS (ref 0–0)
PHOSPHATE SERPL-MCNC: 3.3 MG/DL (ref 2.5–4.9)
PHOSPHATE SERPL-MCNC: 3.3 MG/DL (ref 2.5–4.9)
PLATELET # BLD AUTO: 190 X10*3/UL (ref 150–450)
PLATELET # BLD AUTO: 190 X10*3/UL (ref 150–450)
POTASSIUM SERPL-SCNC: 3.8 MMOL/L (ref 3.5–5.3)
POTASSIUM SERPL-SCNC: 3.8 MMOL/L (ref 3.5–5.3)
RBC # BLD AUTO: 5.02 X10*6/UL (ref 4.5–5.9)
RBC # BLD AUTO: 5.02 X10*6/UL (ref 4.5–5.9)
SODIUM SERPL-SCNC: 135 MMOL/L (ref 136–145)
SODIUM SERPL-SCNC: 135 MMOL/L (ref 136–145)
WBC # BLD AUTO: 6.4 X10*3/UL (ref 4.4–11.3)
WBC # BLD AUTO: 6.4 X10*3/UL (ref 4.4–11.3)

## 2024-09-25 PROCEDURE — 84484 ASSAY OF TROPONIN QUANT: CPT | Performed by: STUDENT IN AN ORGANIZED HEALTH CARE EDUCATION/TRAINING PROGRAM

## 2024-09-25 PROCEDURE — 99233 SBSQ HOSP IP/OBS HIGH 50: CPT | Performed by: STUDENT IN AN ORGANIZED HEALTH CARE EDUCATION/TRAINING PROGRAM

## 2024-09-25 PROCEDURE — 83605 ASSAY OF LACTIC ACID: CPT

## 2024-09-25 PROCEDURE — 2500000004 HC RX 250 GENERAL PHARMACY W/ HCPCS (ALT 636 FOR OP/ED)

## 2024-09-25 PROCEDURE — 1210000001 HC SEMI-PRIVATE ROOM DAILY

## 2024-09-25 PROCEDURE — 36415 COLL VENOUS BLD VENIPUNCTURE: CPT | Performed by: STUDENT IN AN ORGANIZED HEALTH CARE EDUCATION/TRAINING PROGRAM

## 2024-09-25 PROCEDURE — 36415 COLL VENOUS BLD VENIPUNCTURE: CPT

## 2024-09-25 PROCEDURE — 82550 ASSAY OF CK (CPK): CPT | Performed by: STUDENT IN AN ORGANIZED HEALTH CARE EDUCATION/TRAINING PROGRAM

## 2024-09-25 PROCEDURE — 82947 ASSAY GLUCOSE BLOOD QUANT: CPT

## 2024-09-25 PROCEDURE — 2500000002 HC RX 250 W HCPCS SELF ADMINISTERED DRUGS (ALT 637 FOR MEDICARE OP, ALT 636 FOR OP/ED)

## 2024-09-25 PROCEDURE — 80069 RENAL FUNCTION PANEL: CPT

## 2024-09-25 PROCEDURE — 85025 COMPLETE CBC W/AUTO DIFF WBC: CPT

## 2024-09-25 PROCEDURE — 2500000001 HC RX 250 WO HCPCS SELF ADMINISTERED DRUGS (ALT 637 FOR MEDICARE OP)

## 2024-09-25 PROCEDURE — 83735 ASSAY OF MAGNESIUM: CPT

## 2024-09-25 RX ORDER — RISPERIDONE 1 MG/1
1 TABLET ORAL DAILY
COMMUNITY
End: 2024-10-05 | Stop reason: HOSPADM

## 2024-09-25 RX ORDER — LOVASTATIN 40 MG/1
40 TABLET ORAL NIGHTLY
COMMUNITY

## 2024-09-25 RX ORDER — TRAZODONE HYDROCHLORIDE 100 MG/1
1-2 TABLET ORAL NIGHTLY
COMMUNITY
End: 2024-10-05 | Stop reason: HOSPADM

## 2024-09-25 RX ORDER — LATANOPROST 50 UG/ML
1 SOLUTION/ DROPS OPHTHALMIC NIGHTLY
COMMUNITY

## 2024-09-25 RX ORDER — METFORMIN HYDROCHLORIDE 500 MG/1
1000 TABLET, EXTENDED RELEASE ORAL
COMMUNITY

## 2024-09-25 RX ORDER — TRIAMTERENE AND HYDROCHLOROTHIAZIDE 37.5; 25 MG/1; MG/1
1 CAPSULE ORAL EVERY MORNING
COMMUNITY
End: 2024-10-05 | Stop reason: HOSPADM

## 2024-09-25 RX ORDER — PROPRANOLOL HYDROCHLORIDE 20 MG/1
20 TABLET ORAL DAILY PRN
COMMUNITY

## 2024-09-25 ASSESSMENT — PAIN SCALES - GENERAL
PAINLEVEL_OUTOF10: 8
PAINLEVEL_OUTOF10: 0 - NO PAIN
PAINLEVEL_OUTOF10: 8
PAINLEVEL_OUTOF10: 6
PAINLEVEL_OUTOF10: 0 - NO PAIN
PAINLEVEL_OUTOF10: 0 - NO PAIN
PAINLEVEL_OUTOF10: 2
PAINLEVEL_OUTOF10: 0 - NO PAIN
PAINLEVEL_OUTOF10: 5 - MODERATE PAIN
PAINLEVEL_OUTOF10: 0 - NO PAIN
PAINLEVEL_OUTOF10: 8
PAINLEVEL_OUTOF10: 0 - NO PAIN

## 2024-09-25 ASSESSMENT — PAIN DESCRIPTION - PAIN TYPE: TYPE: ACUTE PAIN

## 2024-09-25 ASSESSMENT — PAIN DESCRIPTION - DESCRIPTORS: DESCRIPTORS: SORE

## 2024-09-25 ASSESSMENT — PAIN - FUNCTIONAL ASSESSMENT
PAIN_FUNCTIONAL_ASSESSMENT: 0-10

## 2024-09-25 ASSESSMENT — PAIN DESCRIPTION - LOCATION
LOCATION: OTHER (COMMENT)
LOCATION: BACK
LOCATION: BACK

## 2024-09-25 NOTE — PROGRESS NOTES
Assessment/Plan   Seven Robin is a 74 y.o. male with PMHx of HTN, HLD, DM, cirrhosis 2/2 treated Hep C (Tx [id number]), BPH, schizoaffective disorder and prostate CA s/p EBRT presenting to the ED s/p mechanical fall leading to trauma activation. Pt reports he was standing on a table when he fell backwards hitting his head last night. No proceeding symptoms, no b/b incontinence, or AMS post-event. Has a large postior scalp contusion. No new neuro deficits. Denies any other associated symptoms other than pain in his head and back. Trauma eval was negative, imaging neg for fracture or bleed. Had lactic acidosis and rhabdo, improving s/p fluids. Trop with mild elevation, no EKG new changes or clinical anginal equivalents at this time..      # Mechanical Fall  #posterior scalp contusoin  - no bleed or fractures identified, trauma evaluated and signed off  - lactic acidosis and elevated cpk noted, improving.   - no deficts, no mentation changes.   - denies chest pain or equivalents but trop with slight uptrend pending repeat.  Low concern for acs  - -Tylenlol and oxycodone 5 mg for pain   - pending pt/ot  - wound care cosult    #rhabdomyolysis   - CK elevated to 3,189, lactate 6.9 -> 2.9 s/p IVF   -C/w NS, increase to 150 ml/hr  -Trend CK and lactate     #troponin elevation  - mild uptrend  - pending repeat  - no angina or equivalents  - low concern for acs, likely related to demand     #HTN   #HLD  -On lovastatin, triamterene-hydroCHLOROthiazide 37.5-25 mg daily, and ASA 81mg   -C/w ASA 81 mg daily   -Holding statin iso c/f rhabo   -bp controlled  - plan to check orthostatics tomorrow.      #DM   -Check A1c  -Holding home Metformin 500mg BID   -Start SSI 1:50 for glc >150     #Schizoaffective disorder  #MDD   #insomnia   -On propanolol 10 mg, Risperidone 2.5 mg tablet and trazodone 100 mg tablet   -C/w Risperidone 2.5 mg and Trazodone 100mg      #BPH  -C/w Tamsulosin      Scheduled outpatient appointments in system:  "  No future appointments.  ---------------------------------------------------------------------------------------------------  Subjective   No new events.  Patient denies new complaints.  Continues to posterior head pain back pain, no new locations of pain.  Denies preceding symptoms or symptoms after the fall and then pain and inability to get up.  Denies any change in mentation.  No new neurological changes reported.  Denies fevers chills, URI symptoms GI or  issues    ---------------------------------------------------------------------------------------------------  Objective   Last Recorded Vitals  Blood pressure 143/72, pulse 76, temperature 36.4 °C (97.6 °F), resp. rate 16, height 1.803 m (5' 11\"), weight 94.3 kg (208 lb), SpO2 96%.  Intake/Output last 3 Shifts:  I/O last 3 completed shifts:  In: 1000 (10.6 mL/kg) [IV Piggyback:1000]  Out: 150 (1.6 mL/kg) [Urine:150 (0 mL/kg/hr)]  Weight: 94.3 kg     Physical Exam  Vitals and nursing note reviewed.   Constitutional:       General: He is not in acute distress.     Appearance: Normal appearance. He is not ill-appearing or toxic-appearing.   HENT:      Head: Normocephalic and atraumatic.      Comments: Posterior scalp contusion covered with mepilex     Mouth/Throat:      Mouth: Mucous membranes are moist.   Eyes:      General: No scleral icterus.     Extraocular Movements: Extraocular movements intact.      Conjunctiva/sclera: Conjunctivae normal.   Cardiovascular:      Rate and Rhythm: Normal rate and regular rhythm.      Heart sounds: S1 normal and S2 normal. No murmur heard.  Pulmonary:      Effort: Pulmonary effort is normal. No respiratory distress.      Breath sounds: No wheezing, rhonchi or rales.   Abdominal:      General: Bowel sounds are normal. There is no distension.      Palpations: Abdomen is soft.      Tenderness: There is no abdominal tenderness. There is no guarding or rebound.   Musculoskeletal:         General: No swelling or deformity.     "  Cervical back: Neck supple.   Skin:     General: Skin is warm and dry.      Findings: No rash.   Neurological:      General: No focal deficit present.      Mental Status: He is alert. Mental status is at baseline.   Psychiatric:         Mood and Affect: Mood normal.         Relevant Results  Lab Results   Component Value Date    WBC 6.4 09/25/2024    HGB 14.0 09/25/2024    HCT 41.7 09/25/2024    MCV 83 09/25/2024     09/25/2024      Lab Results   Component Value Date    GLUCOSE 121 (H) 09/25/2024    CALCIUM 8.9 09/25/2024     (L) 09/25/2024    K 3.8 09/25/2024    CO2 27 09/25/2024    CL 97 (L) 09/25/2024    BUN 13 09/25/2024    CREATININE 1.00 09/25/2024     Scheduled medications  aspirin, 81 mg, oral, Daily  enoxaparin, 40 mg, subcutaneous, q24h  insulin lispro, 0-5 Units, subcutaneous, TID  iohexol, 150 mL, intravenous, Once in imaging  polyethylene glycol, 17 g, oral, Daily  risperiDONE, 2.5 mg, oral, Daily  tamsulosin, 0.4 mg, oral, Daily  traZODone, 100 mg, oral, Nightly      Continuous medications  sodium chloride 0.9%, 50 mL/hr, Last Rate: 50 mL/hr (09/24/24 1335)      PRN medications  PRN medications: acetaminophen, dextrose, dextrose, glucagon, glucagon, oxyCODONE    Randy Patino MD

## 2024-09-25 NOTE — PROGRESS NOTES
Pharmacy Medication History Review    Seven Robin is a 74 y.o. male admitted for Fall, initial encounter. Pharmacy reviewed the patient's vasqy-gg-eamcbttdm medications and allergies for accuracy.    Medications ADDED:  All current meds  Medications CHANGED:  N/A  Medications REMOVED:   N/A     The list below reflects the updated PTA list.   Prior to Admission Medications   Prescriptions Last Dose Informant   latanoprost (Xalatan) 0.005 % ophthalmic solution  Other   Sig: Administer 1 drop into both eyes once daily at bedtime.   lovastatin (Mevacor) 40 mg tablet  Self, Other   Sig: Take 1 tablet (40 mg) by mouth once daily at bedtime.   metFORMIN XR (Glucophage-XR) 500 mg 24 hr tablet Past Week at Monday Self, Other   Sig: Take 2 tablets (1,000 mg) by mouth 2 times daily (morning and late afternoon).   propranolol (Inderal) 20 mg tablet  Other   Sig: Take 1 tablet (20 mg) by mouth once daily as needed.   risperiDONE (RisperDAL) 1 mg tablet Past Week at Monday Self, Other   Sig: Take 1 tablet (1 mg) by mouth once daily.   traZODone (Desyrel) 100 mg tablet Past Week at Monday Self, Other   Sig: Take 1-2 tablets (100-200 mg) by mouth once daily at bedtime.   triamterene-hydrochlorothiazid (Dyazide) 37.5-25 mg capsule  Other   Sig: Take 1 capsule by mouth once daily in the morning.      Facility-Administered Medications: None        The list below reflects the updated allergy list. Please review each documented allergy for additional clarification and justification.  Allergies  Reviewed by Breanna Phoenix on 9/25/2024   No Known Allergies         Patient accepts M2B at discharge.     Sources:   Sources included out patient fill history, OARRS, and patient interview (patient was a moderate med historian. I called patients pharmacy ([facility]) to get his med list).    Additional Comments:  Patient was able to recall a few of his meds (Metformin, Risperidone, Trazodone, Lovastatin) but could not remember the rest of  "them or the dosage, accept for the Risperidone.   Patient reported that he was taking Atorvastatin but there were no records of the medication.       ZEE IRAHETA PHOENIX  Pharmacy Technician  09/25/24     Secure Chat preferred   If no response call [phone] or Close \"Med Rec\"   "

## 2024-09-25 NOTE — PROGRESS NOTES
Physical Therapy                 Therapy Communication Note    Patient Name: Seven Robin  MRN: [id number]  Department: [facility] ED  Room: OD03/DPOD03  Today's Date: 9/25/2024     Discipline: Physical Therapy    Missed Visit Reason: Missed Visit Reason: Patient placed on medical hold (Pt with slightly elevated uptrending troponin - per medical team, defer until new troponin drawn; will reattempt as appropriate)    Missed Time: Attempt 1019      09/25/24 at 12:57 PM - Elmira Mitchell, PT

## 2024-09-25 NOTE — PROGRESS NOTES
Pharmacy Admission Order Reconciliation Review    Seven Robin is a 74 y.o. male admitted for Fall, initial encounter. Pharmacy reviewed the patient's unreconciled admission medications.    Prior to admission medications that were reviewed and acted on by the pharmacist include:  Metformin  Lovastatin  trazodone  These medications have been reconciled.     Any other unreconcilied medications have been addressed and will be ordered or held by the patient's medical team. Medications addressed by the pharmacist may be added or changed by the patient's medical team at any time.    Selma Wong, PharmD  Transitions of Care Pharmacist  [facility]  Please reach out via Secure Chat for questions

## 2024-09-25 NOTE — CONSULTS
Wound Care Consult     Visit Date: 9/25/2024      Patient Name: Seven Robin         MRN: [id number]           YOB: 1950     Reason for Consult: posterior scalp        Wound History: presenting to the ED s/p mechanical fall leading to trauma activation. Pt reports he was standing on a table when he fell backwards hitting his head last night.      Wound Assessment:  Wound 09/25/24 Pressure Injury Head Dorsal (Active)   Wound Image   09/25/24 1919   Site Assessment Black;Eschar 09/25/24 1919   Diana-Wound Assessment Red;Moist  09/25/24 1919   Pressure Injury Stage U 09/25/24 1919   Wound Length (cm) 8 cm 09/25/24 1919   Wound Width (cm) 14 cm 09/25/24 1919   Wound Surface Area (cm^2) 112 cm^2 09/25/24 1919   Drainage Description Clear;Serous 09/25/24 1919   Drainage Amount Scant 09/25/24 1919   Dressing Xeroform;Silicone border dressing 09/25/24 1919   Dressing Changed New 09/25/24 1919   Dressing Status Clean;Dry;Occlusive 09/25/24 1919       Wound 09/25/24 Abrasion Arm Dorsal;Left;Lower;Proximal (Active)   Site Assessment Red;Sloughing 09/25/24 1918   Diana-Wound Assessment Edematous;Fragile 09/25/24 1918   Wound Length (cm) 2.5 cm 09/25/24 1918   Wound Width (cm) 2.5 cm 09/25/24 1918   Wound Surface Area (cm^2) 6.25 cm^2 09/25/24 1918   Drainage Description Serosanguineous 09/25/24 1918   Drainage Amount Scant 09/25/24 1918   Dressing Xeroform;Silicone border dressing 09/25/24 1918   Dressing Changed New 09/25/24 1918   Dressing Status Clean;Dry;Occlusive 09/25/24 1918       Wound 09/25/24 Pressure Injury Arm Left;Lower;Proximal;Anterior (Active)   Wound Image   09/25/24 1917   Site Assessment Black;Eschar 09/25/24 1917   Diana-Wound Assessment Edematous;Fragile 09/25/24 1917   Pressure Injury Stage U 09/25/24 1917   Wound Length (cm) 1.2 cm 09/25/24 1917   Wound Width (cm) 1.2 cm 09/25/24 1917   Wound Surface Area (cm^2) 1.44 cm^2 09/25/24 1917   Drainage Description None 09/25/24 1917   Drainage  Amount None 09/25/24 1917   Dressing Xeroform;Silicone border dressing 09/25/24 1917   Dressing Changed New 09/25/24 1917   Dressing Status Clean;Dry;Occlusive 09/25/24 1917     Wound location: posterior scalp         Undermining: no  Tracking: no  Wound type: unstageable pressure injury  Wound bed: moist unstable black eschar  Draining: scant serous  Periwound skin: fragile red  Therapeutic surface: ED cot    Wound location: Distal dorsolateral LUE         Undermining: no  Tracking: no  Wound type: abrasion  Wound bed: moist red   Draining: serous fluid  Periwound skin: edematous    Wound location: Lateral elbow Left upper arm       Undermining: no  Tracking: no  Wound type: unstageable pressure injury  Wound bed: moist black eschar  Draining: none  Periwound skin: fragile edematous eythema    Recommendations: DAILY for all wounds  Irrigate with normal saline or wound cleanser   Apply Xeroform dressing to open wound bed   Cover with Mepilex border dressing      While in bed patient should only be on one EHOB air mattress overlay, a fitted sheet, and one EHOB repositioning sheet with appropriate white chux. Please do not use brief while patient is resting in bed. Turn and reposition patient at least every 2 hours.  Elevate heels off the bed surface at all times.       Wound Team Plan:  Recommend ACS consult for posterior scalp assessment. Primary provider please review the wound care consult note and pending wound care order. If you agree with orders please file in EMR.       Aysha Fung RN, CWON  9/25/2024  7:21 PM

## 2024-09-25 NOTE — HOSPITAL COURSE
Seven Robin is a 74 y.o. male with PMHx of HTN, HLD, DM, cirrhosis 2/2 treated Hep C (Tx [id number]), BPH, schizoaffective disorder and prostate CA s/p EBRT presenting to the ED s/p mechanical fall leading to trauma activation. Pt reports he was standing on a table when he fell backwards hitting his head last night. No proceeding symptoms, no b/b incontinence, or AMS post-event. Has a large postior scalp contusion. No new neuro deficits. Denies any other associated symptoms other than pain in his head and back. Trauma eval was negative, imaging neg for fracture or bleed. Had lactic acidosis and rhabdo, improving s/p fluids. Trop with mild elevation, no EKG new changes or clinical anginal equivalents at this time.

## 2024-09-26 ENCOUNTER — APPOINTMENT (OUTPATIENT)
Dept: RADIOLOGY | Facility: HOSPITAL | Age: 74
End: 2024-09-26
Payer: COMMERCIAL

## 2024-09-26 LAB
ALBUMIN SERPL BCP-MCNC: 3.8 G/DL (ref 3.4–5)
ALBUMIN SERPL BCP-MCNC: 3.8 G/DL (ref 3.4–5)
ANION GAP SERPL CALC-SCNC: 14 MMOL/L (ref 10–20)
ANION GAP SERPL CALC-SCNC: 14 MMOL/L (ref 10–20)
BASOPHILS # BLD AUTO: 0.03 X10*3/UL (ref 0–0.1)
BASOPHILS # BLD AUTO: 0.03 X10*3/UL (ref 0–0.1)
BASOPHILS NFR BLD AUTO: 0.6 %
BASOPHILS NFR BLD AUTO: 0.6 %
BUN SERPL-MCNC: 11 MG/DL (ref 6–23)
BUN SERPL-MCNC: 11 MG/DL (ref 6–23)
CALCIUM SERPL-MCNC: 8.7 MG/DL (ref 8.6–10.6)
CALCIUM SERPL-MCNC: 8.7 MG/DL (ref 8.6–10.6)
CHLORIDE SERPL-SCNC: 99 MMOL/L (ref 98–107)
CHLORIDE SERPL-SCNC: 99 MMOL/L (ref 98–107)
CK SERPL-CCNC: 3034 U/L (ref 0–325)
CK SERPL-CCNC: 3034 U/L (ref 0–325)
CO2 SERPL-SCNC: 26 MMOL/L (ref 21–32)
CO2 SERPL-SCNC: 26 MMOL/L (ref 21–32)
CREAT SERPL-MCNC: 0.9 MG/DL (ref 0.5–1.3)
CREAT SERPL-MCNC: 0.9 MG/DL (ref 0.5–1.3)
EGFRCR SERPLBLD CKD-EPI 2021: 90 ML/MIN/1.73M*2
EGFRCR SERPLBLD CKD-EPI 2021: 90 ML/MIN/1.73M*2
EOSINOPHIL # BLD AUTO: 0.04 X10*3/UL (ref 0–0.4)
EOSINOPHIL # BLD AUTO: 0.04 X10*3/UL (ref 0–0.4)
EOSINOPHIL NFR BLD AUTO: 0.8 %
EOSINOPHIL NFR BLD AUTO: 0.8 %
ERYTHROCYTE [DISTWIDTH] IN BLOOD BY AUTOMATED COUNT: 13.4 % (ref 11.5–14.5)
ERYTHROCYTE [DISTWIDTH] IN BLOOD BY AUTOMATED COUNT: 13.4 % (ref 11.5–14.5)
GLUCOSE BLD MANUAL STRIP-MCNC: 126 MG/DL (ref 74–99)
GLUCOSE BLD MANUAL STRIP-MCNC: 126 MG/DL (ref 74–99)
GLUCOSE BLD MANUAL STRIP-MCNC: 127 MG/DL (ref 74–99)
GLUCOSE BLD MANUAL STRIP-MCNC: 127 MG/DL (ref 74–99)
GLUCOSE BLD MANUAL STRIP-MCNC: 132 MG/DL (ref 74–99)
GLUCOSE BLD MANUAL STRIP-MCNC: 132 MG/DL (ref 74–99)
GLUCOSE BLD MANUAL STRIP-MCNC: 177 MG/DL (ref 74–99)
GLUCOSE BLD MANUAL STRIP-MCNC: 177 MG/DL (ref 74–99)
GLUCOSE SERPL-MCNC: 125 MG/DL (ref 74–99)
GLUCOSE SERPL-MCNC: 125 MG/DL (ref 74–99)
HCT VFR BLD AUTO: 40 % (ref 41–52)
HCT VFR BLD AUTO: 40 % (ref 41–52)
HGB BLD-MCNC: 13.6 G/DL (ref 13.5–17.5)
HGB BLD-MCNC: 13.6 G/DL (ref 13.5–17.5)
IMM GRANULOCYTES # BLD AUTO: 0.01 X10*3/UL (ref 0–0.5)
IMM GRANULOCYTES # BLD AUTO: 0.01 X10*3/UL (ref 0–0.5)
IMM GRANULOCYTES NFR BLD AUTO: 0.2 % (ref 0–0.9)
IMM GRANULOCYTES NFR BLD AUTO: 0.2 % (ref 0–0.9)
LYMPHOCYTES # BLD AUTO: 0.89 X10*3/UL (ref 0.8–3)
LYMPHOCYTES # BLD AUTO: 0.89 X10*3/UL (ref 0.8–3)
LYMPHOCYTES NFR BLD AUTO: 17 %
LYMPHOCYTES NFR BLD AUTO: 17 %
MAGNESIUM SERPL-MCNC: 2.16 MG/DL (ref 1.6–2.4)
MAGNESIUM SERPL-MCNC: 2.16 MG/DL (ref 1.6–2.4)
MCH RBC QN AUTO: 28.6 PG (ref 26–34)
MCH RBC QN AUTO: 28.6 PG (ref 26–34)
MCHC RBC AUTO-ENTMCNC: 34 G/DL (ref 32–36)
MCHC RBC AUTO-ENTMCNC: 34 G/DL (ref 32–36)
MCV RBC AUTO: 84 FL (ref 80–100)
MCV RBC AUTO: 84 FL (ref 80–100)
MONOCYTES # BLD AUTO: 0.54 X10*3/UL (ref 0.05–0.8)
MONOCYTES # BLD AUTO: 0.54 X10*3/UL (ref 0.05–0.8)
MONOCYTES NFR BLD AUTO: 10.3 %
MONOCYTES NFR BLD AUTO: 10.3 %
NEUTROPHILS # BLD AUTO: 3.73 X10*3/UL (ref 1.6–5.5)
NEUTROPHILS # BLD AUTO: 3.73 X10*3/UL (ref 1.6–5.5)
NEUTROPHILS NFR BLD AUTO: 71.1 %
NEUTROPHILS NFR BLD AUTO: 71.1 %
NRBC BLD-RTO: 0 /100 WBCS (ref 0–0)
NRBC BLD-RTO: 0 /100 WBCS (ref 0–0)
PHOSPHATE SERPL-MCNC: 2.8 MG/DL (ref 2.5–4.9)
PHOSPHATE SERPL-MCNC: 2.8 MG/DL (ref 2.5–4.9)
PLATELET # BLD AUTO: 170 X10*3/UL (ref 150–450)
PLATELET # BLD AUTO: 170 X10*3/UL (ref 150–450)
POTASSIUM SERPL-SCNC: 4.1 MMOL/L (ref 3.5–5.3)
POTASSIUM SERPL-SCNC: 4.1 MMOL/L (ref 3.5–5.3)
RBC # BLD AUTO: 4.75 X10*6/UL (ref 4.5–5.9)
RBC # BLD AUTO: 4.75 X10*6/UL (ref 4.5–5.9)
SODIUM SERPL-SCNC: 135 MMOL/L (ref 136–145)
SODIUM SERPL-SCNC: 135 MMOL/L (ref 136–145)
WBC # BLD AUTO: 5.2 X10*3/UL (ref 4.4–11.3)
WBC # BLD AUTO: 5.2 X10*3/UL (ref 4.4–11.3)

## 2024-09-26 PROCEDURE — 99233 SBSQ HOSP IP/OBS HIGH 50: CPT | Performed by: STUDENT IN AN ORGANIZED HEALTH CARE EDUCATION/TRAINING PROGRAM

## 2024-09-26 PROCEDURE — 84134 ASSAY OF PREALBUMIN: CPT | Performed by: STUDENT IN AN ORGANIZED HEALTH CARE EDUCATION/TRAINING PROGRAM

## 2024-09-26 PROCEDURE — 97161 PT EVAL LOW COMPLEX 20 MIN: CPT | Mod: GP | Performed by: PHYSICAL THERAPIST

## 2024-09-26 PROCEDURE — 82947 ASSAY GLUCOSE BLOOD QUANT: CPT

## 2024-09-26 PROCEDURE — 70551 MRI BRAIN STEM W/O DYE: CPT | Performed by: STUDENT IN AN ORGANIZED HEALTH CARE EDUCATION/TRAINING PROGRAM

## 2024-09-26 PROCEDURE — 1200000002 HC GENERAL ROOM WITH TELEMETRY DAILY

## 2024-09-26 PROCEDURE — 85025 COMPLETE CBC W/AUTO DIFF WBC: CPT

## 2024-09-26 PROCEDURE — 83735 ASSAY OF MAGNESIUM: CPT

## 2024-09-26 PROCEDURE — 36415 COLL VENOUS BLD VENIPUNCTURE: CPT

## 2024-09-26 PROCEDURE — 70551 MRI BRAIN STEM W/O DYE: CPT

## 2024-09-26 PROCEDURE — 97530 THERAPEUTIC ACTIVITIES: CPT | Mod: GP | Performed by: PHYSICAL THERAPIST

## 2024-09-26 PROCEDURE — 80069 RENAL FUNCTION PANEL: CPT

## 2024-09-26 PROCEDURE — 2500000002 HC RX 250 W HCPCS SELF ADMINISTERED DRUGS (ALT 637 FOR MEDICARE OP, ALT 636 FOR OP/ED)

## 2024-09-26 PROCEDURE — 2500000001 HC RX 250 WO HCPCS SELF ADMINISTERED DRUGS (ALT 637 FOR MEDICARE OP)

## 2024-09-26 PROCEDURE — 82550 ASSAY OF CK (CPK): CPT | Performed by: STUDENT IN AN ORGANIZED HEALTH CARE EDUCATION/TRAINING PROGRAM

## 2024-09-26 PROCEDURE — 2500000004 HC RX 250 GENERAL PHARMACY W/ HCPCS (ALT 636 FOR OP/ED): Performed by: STUDENT IN AN ORGANIZED HEALTH CARE EDUCATION/TRAINING PROGRAM

## 2024-09-26 PROCEDURE — 2500000004 HC RX 250 GENERAL PHARMACY W/ HCPCS (ALT 636 FOR OP/ED)

## 2024-09-26 RX ORDER — ACETAMINOPHEN 500 MG
10 TABLET ORAL NIGHTLY
Status: DISCONTINUED | OUTPATIENT
Start: 2024-09-26 | End: 2024-10-05 | Stop reason: HOSPADM

## 2024-09-26 SDOH — SOCIAL STABILITY: SOCIAL INSECURITY: POSSIBLE ABUSE REPORTED TO:: SOCIAL SERVICES

## 2024-09-26 SDOH — SOCIAL STABILITY: SOCIAL INSECURITY: DO YOU FEEL UNSAFE GOING BACK TO THE PLACE WHERE YOU ARE LIVING?: NO

## 2024-09-26 SDOH — SOCIAL STABILITY: SOCIAL INSECURITY: HAVE YOU HAD THOUGHTS OF HARMING ANYONE ELSE?: NO

## 2024-09-26 SDOH — SOCIAL STABILITY: SOCIAL INSECURITY: ABUSE: ADULT

## 2024-09-26 SDOH — SOCIAL STABILITY: SOCIAL INSECURITY: DOES ANYONE TRY TO KEEP YOU FROM HAVING/CONTACTING OTHER FRIENDS OR DOING THINGS OUTSIDE YOUR HOME?: NO

## 2024-09-26 SDOH — SOCIAL STABILITY: SOCIAL INSECURITY: HAS ANYONE EVER THREATENED TO HURT YOUR FAMILY OR YOUR PETS?: NO

## 2024-09-26 SDOH — SOCIAL STABILITY: SOCIAL INSECURITY: DO YOU FEEL ANYONE HAS EXPLOITED OR TAKEN ADVANTAGE OF YOU FINANCIALLY OR OF YOUR PERSONAL PROPERTY?: NO

## 2024-09-26 SDOH — SOCIAL STABILITY: SOCIAL INSECURITY: ARE YOU OR HAVE YOU BEEN THREATENED OR ABUSED PHYSICALLY, EMOTIONALLY, OR SEXUALLY BY ANYONE?: NO

## 2024-09-26 SDOH — SOCIAL STABILITY: SOCIAL INSECURITY: WERE YOU ABLE TO COMPLETE ALL THE BEHAVIORAL HEALTH SCREENINGS?: YES

## 2024-09-26 SDOH — SOCIAL STABILITY: SOCIAL INSECURITY: ARE THERE ANY APPARENT SIGNS OF INJURIES/BEHAVIORS THAT COULD BE RELATED TO ABUSE/NEGLECT?: YES

## 2024-09-26 SDOH — SOCIAL STABILITY: SOCIAL INSECURITY: HAVE YOU HAD ANY THOUGHTS OF HARMING ANYONE ELSE?: NO

## 2024-09-26 ASSESSMENT — LIFESTYLE VARIABLES
SUBSTANCE_ABUSE_PAST_12_MONTHS: NO
HOW OFTEN DO YOU HAVE 6 OR MORE DRINKS ON ONE OCCASION: PATIENT UNABLE TO ANSWER
PRESCIPTION_ABUSE_PAST_12_MONTHS: NO
SKIP TO QUESTIONS 9-10: 0
AUDIT-C TOTAL SCORE: -1
HOW OFTEN DO YOU HAVE A DRINK CONTAINING ALCOHOL: PATIENT UNABLE TO ANSWER
AUDIT-C TOTAL SCORE: -1
HOW MANY STANDARD DRINKS CONTAINING ALCOHOL DO YOU HAVE ON A TYPICAL DAY: PATIENT UNABLE TO ANSWER

## 2024-09-26 ASSESSMENT — PAIN SCALES - GENERAL
PAINLEVEL_OUTOF10: 6
PAINLEVEL_OUTOF10: 8
PAINLEVEL_OUTOF10: 8
PAINLEVEL_OUTOF10: 10 - WORST POSSIBLE PAIN
PAINLEVEL_OUTOF10: 5 - MODERATE PAIN
PAINLEVEL_OUTOF10: 7
PAINLEVEL_OUTOF10: 7
PAINLEVEL_OUTOF10: 8

## 2024-09-26 ASSESSMENT — COGNITIVE AND FUNCTIONAL STATUS - GENERAL
STANDING UP FROM CHAIR USING ARMS: A LITTLE
DRESSING REGULAR LOWER BODY CLOTHING: A LITTLE
WALKING IN HOSPITAL ROOM: TOTAL
MOVING TO AND FROM BED TO CHAIR: A LITTLE
MOVING TO AND FROM BED TO CHAIR: A LITTLE
MOBILITY SCORE: 17
DRESSING REGULAR UPPER BODY CLOTHING: A LOT
CLIMB 3 TO 5 STEPS WITH RAILING: TOTAL
MOVING TO AND FROM BED TO CHAIR: A LITTLE
MOVING FROM LYING ON BACK TO SITTING ON SIDE OF FLAT BED WITH BEDRAILS: A LOT
DRESSING REGULAR UPPER BODY CLOTHING: A LOT
CLIMB 3 TO 5 STEPS WITH RAILING: TOTAL
DRESSING REGULAR LOWER BODY CLOTHING: A LITTLE
TOILETING: A LITTLE
MOBILITY SCORE: 17
PATIENT BASELINE BEDBOUND: NO
HELP NEEDED FOR BATHING: A LOT
TURNING FROM BACK TO SIDE WHILE IN FLAT BAD: A LOT
WALKING IN HOSPITAL ROOM: A LOT
DAILY ACTIVITIY SCORE: 18
CLIMB 3 TO 5 STEPS WITH RAILING: TOTAL
STANDING UP FROM CHAIR USING ARMS: A LITTLE
MOBILITY SCORE: 12
TOILETING: A LITTLE
STANDING UP FROM CHAIR USING ARMS: A LITTLE
DAILY ACTIVITIY SCORE: 18
WALKING IN HOSPITAL ROOM: A LOT
HELP NEEDED FOR BATHING: A LOT

## 2024-09-26 ASSESSMENT — ACTIVITIES OF DAILY LIVING (ADL): LACK_OF_TRANSPORTATION: PATIENT UNABLE TO ANSWER

## 2024-09-26 ASSESSMENT — PAIN DESCRIPTION - DESCRIPTORS
DESCRIPTORS: SORE;THROBBING
DESCRIPTORS: SORE
DESCRIPTORS: SORE
DESCRIPTORS: ACHING;SORE;THROBBING

## 2024-09-26 ASSESSMENT — PAIN - FUNCTIONAL ASSESSMENT
PAIN_FUNCTIONAL_ASSESSMENT: 0-10

## 2024-09-26 ASSESSMENT — PATIENT HEALTH QUESTIONNAIRE - PHQ9
1. LITTLE INTEREST OR PLEASURE IN DOING THINGS: NOT AT ALL
SUM OF ALL RESPONSES TO PHQ9 QUESTIONS 1 & 2: 0
2. FEELING DOWN, DEPRESSED OR HOPELESS: NOT AT ALL

## 2024-09-26 ASSESSMENT — PAIN DESCRIPTION - LOCATION
LOCATION: GENERALIZED
LOCATION: HEAD
LOCATION: BACK

## 2024-09-26 ASSESSMENT — PAIN DESCRIPTION - PAIN TYPE: TYPE: ACUTE PAIN

## 2024-09-26 ASSESSMENT — PAIN DESCRIPTION - ORIENTATION
ORIENTATION: POSTERIOR
ORIENTATION: RIGHT;LEFT

## 2024-09-26 NOTE — PROGRESS NOTES
Assessment/Plan   Seven Robin is a 74 y.o. male with PMHx of HTN, HLD, DM, cirrhosis 2/2 treated Hep C (Tx [id number]), BPH, schizoaffective disorder and prostate CA s/p EBRT presenting to the ED s/p mechanical fall leading to trauma activation. Pt reports he was standing on a table when he fell backwards hitting his head last night. No proceeding symptoms, no b/b incontinence, or AMS post-event. Has a large postior scalp contusion. No new neuro deficits. Denies any other associated symptoms other than pain in his head and back. Trauma eval was negative, imaging neg for fracture or bleed. Had lactic acidosis and rhabdo, improving s/p fluids. Trop with mild elevation, no EKG new changes or clinical anginal equivalents at this time..    # Mechanical Fall  #posterior scalp contusoin  - no bleed or fractures identified, trauma evaluated and signed off  - lactic acidosis and elevated cpk noted, improving.   - no deficts, no mentation changes.   - denies chest pain or equivalents but trop with slight uptrend pending repeat.  Low concern for acs  - -Tylenlol and oxycodone 5 mg for pain   - pending pt/ot  - wound care cosult    #large posterior scalp unstagable pressure injury  - wound care ordered  - acs consulted    #rhabdomyolysis   - CK elevated to 3,189, lactate 6.9 -> 2.9 s/p IVF   -C/w NS, increase to 150 ml/hr  -Trend CK and lactate     #troponin elevation  - mild uptrend  - pending repeat  - no angina or equivalents  - low concern for acs, likely related to demand     #HTN   #HLD  -On lovastatin, triamterene-hydroCHLOROthiazide 37.5-25 mg daily, and ASA 81mg   -C/w ASA 81 mg daily   -Holding statin iso c/f rhabo   -bp controlled  - plan to check orthostatics tomorrow.      #DM   -Check A1c: 6.5  -Holding home Metformin 500mg BID   -Start SSI 1:50 for glc >150     #Schizoaffective disorder  #MDD   #insomnia   -On propanolol 10 mg, Risperidone 2.5 mg tablet and trazodone 100 mg tablet   -C/w Risperidone 2.5 mg and  "Trazodone 100mg      #BPH  -C/w Tamsulosin      Scheduled outpatient appointments in system:   No future appointments.  ---------------------------------------------------------------------------------------------------  Subjective   No new events.  Patient denies new complaints.  Pt states mild blurred vision and facial assymetry present since fall, reported to other provider chronic assymetry. No other localizing deficits per her report, no weakness numbness, tingling, changes in speech. Continues to posterior head pain back pain, no new locations of pain.   ---------------------------------------------------------------------------------------------------  Objective   Last Recorded Vitals  Blood pressure (!) 160/93, pulse 78, temperature 36.7 °C (98 °F), resp. rate 12, height 1.803 m (5' 11\"), weight 94.3 kg (208 lb), SpO2 97%.  Intake/Output last 3 Shifts:  I/O last 3 completed shifts:  In: 1000 (10.6 mL/kg) [IV Piggyback:1000]  Out: 150 (1.6 mL/kg) [Urine:150 (0 mL/kg/hr)]  Weight: 94.3 kg     Physical Exam  Vitals and nursing note reviewed.   Constitutional:       General: He is not in acute distress.     Appearance: Normal appearance. He is not ill-appearing or toxic-appearing.   HENT:      Head: Normocephalic and atraumatic.      Comments: Posterior scalp contusion covered with mepilex    Facial swelling and lip swelling, present on admission, noted some facial assymetry and possible subtle weakness on left side face. Sensation intact.      Mouth/Throat:      Mouth: Mucous membranes are moist.   Eyes:      General: No scleral icterus.     Extraocular Movements: Extraocular movements intact.      Conjunctiva/sclera: Conjunctivae normal.   Cardiovascular:      Rate and Rhythm: Normal rate and regular rhythm.      Heart sounds: S1 normal and S2 normal. No murmur heard.  Pulmonary:      Effort: Pulmonary effort is normal. No respiratory distress.      Breath sounds: No wheezing, rhonchi or rales.   Abdominal:    "   General: Bowel sounds are normal. There is no distension.      Palpations: Abdomen is soft.      Tenderness: There is no abdominal tenderness. There is no guarding or rebound.   Musculoskeletal:         General: No swelling or deformity.      Cervical back: Neck supple.   Skin:     General: Skin is warm and dry.      Findings: No rash.   Neurological:      Mental Status: He is alert. Mental status is at baseline.      Comments: Facial asymmetry as above. Strenth and senstation otherwise symmetric without focal deficit.    Psychiatric:         Mood and Affect: Mood normal.         Relevant Results  Lab Results   Component Value Date    WBC 5.2 09/26/2024    HGB 13.6 09/26/2024    HCT 40.0 (L) 09/26/2024    MCV 84 09/26/2024     09/26/2024      Lab Results   Component Value Date    GLUCOSE 125 (H) 09/26/2024    CALCIUM 8.7 09/26/2024     (L) 09/26/2024    K 4.1 09/26/2024    CO2 26 09/26/2024    CL 99 09/26/2024    BUN 11 09/26/2024    CREATININE 0.90 09/26/2024     Scheduled medications  aspirin, 81 mg, oral, Daily  enoxaparin, 40 mg, subcutaneous, q24h  insulin lispro, 0-5 Units, subcutaneous, TID  iohexol, 150 mL, intravenous, Once in imaging  polyethylene glycol, 17 g, oral, Daily  risperiDONE, 2.5 mg, oral, Daily  tamsulosin, 0.4 mg, oral, Daily  traZODone, 100 mg, oral, Nightly      Continuous medications  sodium chloride 0.9%, 150 mL/hr, Last Rate: 150 mL/hr (09/26/24 0659)      PRN medications  PRN medications: acetaminophen, dextrose, dextrose, glucagon, glucagon, oxyCODONE    Randy Patino MD

## 2024-09-26 NOTE — PROGRESS NOTES
Physical Therapy    Physical Therapy Evaluation & Treatment    Patient Name: Seven Robin  MRN: [id number]  [place] [facility]  Room: [id number]  Today's Date: 9/26/2024   Time Calculation  Start Time: 1134  Stop Time: 1201  Time Calculation (min): 27 min    Assessment/Plan   PT Assessment  PT Assessment Results: Decreased strength, Decreased endurance, Impaired balance, Decreased mobility, Impaired vision, Decreased skin integrity, Pain  Rehab Prognosis: Good  End of Session Communication: Bedside nurse (RN notified of elevated BP levels, pt reported pain levels, and pt reported bilateral eyes blurry vision)  Assessment Comment: Pt has noted deficits in strength, balance, mobility, endurance, impaired vision, decreased skin integrity, and pain. Pt would benefit from ongoing PT to address these needs.  End of Session Patient Position: Bed, 2 rail up, Alarm off, not on at start of session (ED bed. HOB elevated)   IP OR SWING BED PT PLAN  Inpatient or Swing Bed: Inpatient  PT Plan  Treatment/Interventions: Bed mobility, Transfer training, Gait training, Stair training, Balance training, Strengthening, Endurance training, Therapeutic activity, Therapeutic exercise  PT Plan: Ongoing PT  PT Frequency: 3 times per week  PT Discharge Recommendations: Moderate intensity level of continued care  Equipment Recommended upon Discharge: Wheeled walker  PT Recommended Transfer Status: Assist x1  PT - OK to Discharge: Yes      Subjective     General Visit Information:  General  Reason for Referral: s/p mechanical fall leading to trauma activation. Pt reports he was standing on a table when he fell backwards hitting his head. Down for 12 hours before being found. Posterior scalp contusion, with obvious scalp deformity and unstageable pressure injury  Past Medical History Relevant to Rehab: HTN, HLD, DM, cirrhosis 2/2 treated Hep C (Tx [id number]), BPH, schizoaffective disorder and prostate CA s/p EBRT  Family/Caregiver  "Present: No  Prior to Session Communication: Bedside nurse  Patient Position Received: Bed, 2 rail up, Alarm off, not on at start of session (ED bed)  Preferred Learning Style: verbal, visual  General Comment: Pt supine in bed with HOB elevated. Pt agreeable to participate in PT evaluation.  Home Living:  Home Living  Type of Home: House  Lives With:  (Cousin)  Home Adaptive Equipment: Cane (SPC. Pt unsure if owns walker. Pt does not use AD at baseline)  Home Layout: Multi-level, Laundry in basement, Bed/bath upstairs, Stairs to alternate level without rails  Alternate Level Stairs-Rails: None  Alternate Level Stairs-Number of Steps: 8  Home Access: Level entry  Bathroom Shower/Tub: Tub/shower unit  Prior Level of Function:  Prior Function Per Pt/Caregiver Report  Level of [place]: Needs assistance with ADLs, Needs assistance with homemaking  Receives Help From:  (Cousin who he lives with)  Vocational:  (Pt and cousin do not work)  Leisure: Watch baseball ([employer])  Hand Dominance: Right  Prior Function Comments: +drives, Pt reports cousin does most cooking, cleaning, laundry, and helps pt get dressed  Precautions:  Precautions  Hearing/Visual Limitations: +glasses all the time (Pt reports blurry vision in both eyes which is new from baseline)  Medical Precautions: Fall precautions  Precautions Comment: Pt reports 2 falls in the past 6mo    Vital Signs:   Pre PT   HR: 86 bpm  SpO2: 97%  BP: 140/85  Patient Position: Lying   During PT   HR: 100 bpm  SpO2: 94%  BP: 157/102  Patient Position: Sitting   Post PT  HR: 93 bpm  SpO2: 98%  BP: 148/91  Patient Position: Lying        Objective   Pain:  Pain Assessment  Pain Assessment: 0-10  0-10 (Numeric) Pain Score: 8 (\"back, head, shoulders, arms, legs, everywhere\")  Pain Descriptors: Sore  Cognition:  Cognition  Overall Cognitive Status: Within Functional Limits  Orientation Level: Oriented X4    General Assessments:     Sensation  Light Touch: No apparent " deficits  Sensation Comment: Pt denies N/T    Strength  Strength Comments: BLE grossly 4/5 based on functional mobility  Static Sitting Balance  Static Sitting-Level of Assistance:  (SBA)  Dynamic Sitting Balance  Dynamic Sitting-Level of Assistance:  (SBA)    Static Standing Balance  Static Standing-Level of Assistance: Contact guard (with RW)  Dynamic Standing Balance  Dynamic Standing-Level of Assistance: Minimum assistance (with RW)  Functional Assessments:     Bed Mobility  Bed Mobility: Yes  Bed Mobility 1  Bed Mobility 1: Supine to sitting, Sitting to supine  Level of Assistance 1: Maximum assistance, Minimal verbal cues  Bed Mobility Comments 1: Pt supine to sit and sit to supine with MaxAx1. Use of bed rail and HHA BUE    Transfers  Transfer: Yes  Transfer 1  Transfer From 1: Sit to, Stand to  Transfer to 1: Stand, Sit  Technique 1: Sit to stand, Stand to sit  Transfer Device 1: Walker  Transfer Level of Assistance 1: Minimum assistance, Minimal verbal cues  Trials/Comments 1: Pt sit to stand and stand to sit 2x in session with RW and Tiffany. Verbal cues for safe handplacement and management of RW.    Ambulation/Gait Training  Ambulation/Gait Training Performed: Yes  Ambulation/Gait Training 1  Surface 1: Level tile  Device 1: Rolling walker  Assistance 1: Minimum assistance, Minimal verbal cues  Quality of Gait 1: Narrow base of support, Inconsistent stride length, Decreased step length, Forward flexed posture, Soft knee(s)  Comments/Distance (ft) 1: Pt took 3 side steps to R with RW and Tiffany. Verbal cues for management of walker.    Stairs  Stairs: No  Extremity/Trunk Assessments:  RUE   RUE : Within Functional Limits  LUE   LUE: Within Functional Limits  RLE   RLE : Within Functional Limits  LLE   LLE : Within Functional Limits  Treatments:        Bed Mobility  Bed Mobility: Yes  Bed Mobility 1  Bed Mobility 1: Supine to sitting, Sitting to supine  Level of Assistance 1: Maximum assistance, Minimal verbal  cues  Bed Mobility Comments 1: Pt supine to sit and sit to supine with MaxAx1. Use of bed rail and HHA BUE    Ambulation/Gait Training  Ambulation/Gait Training Performed: Yes  Ambulation/Gait Training 1  Surface 1: Level tile  Device 1: Rolling walker  Assistance 1: Minimum assistance, Minimal verbal cues  Quality of Gait 1: Narrow base of support, Inconsistent stride length, Decreased step length, Forward flexed posture, Soft knee(s)  Comments/Distance (ft) 1: Pt took 3 side steps to R with RW and Tiffany. Verbal cues for management of walker.  Transfers  Transfer: Yes  Transfer 1  Transfer From 1: Sit to, Stand to  Transfer to 1: Stand, Sit  Technique 1: Sit to stand, Stand to sit  Transfer Device 1: Walker  Transfer Level of Assistance 1: Minimum assistance, Minimal verbal cues  Trials/Comments 1: Pt sit to stand and stand to sit 2x in session with RW and Tiffany. Verbal cues for safe handplacement and management of RW.    Stairs  Stairs: No  Outcome Measures:  [facility] Basic Mobility  Turning from your back to your side while in a flat bed without using bedrails: A lot  Moving from lying on your back to sitting on the side of a flat bed without using bedrails: A lot  Moving to and from bed to chair (including a wheelchair): A little  Standing up from a chair using your arms (e.g. wheelchair or bedside chair): A little  To walk in hospital room: Total  Climbing 3-5 steps with railing: Total  Basic Mobility - Total Score: 12    Encounter Problems       Encounter Problems (Active)       Balance       Patient will score >/=20/28 on [place] to promote safety and prevent falls (Progressing)       Start:  09/26/24    Expected End:  10/10/24               Mobility       Patient will ambulate 150 ft with SBA and LRAD (Progressing)       Start:  09/26/24    Expected End:  10/10/24            Patient will ascend/ descend 8 steps with CGA and LRAD (Progressing)       Start:  09/26/24    Expected End:  10/10/24               PT  Transfers       Patient will be independent with bed mobility (Progressing)       Start:  09/26/24    Expected End:  10/10/24            Patient will be SBA with sit to stand with LRAD (Progressing)       Start:  09/26/24    Expected End:  10/10/24                   Education Documentation  Precautions, taught by MARVEL Perdomo at 9/26/2024  4:47 PM.  Learner: Patient  Readiness: Acceptance  Method: Explanation  Response: Verbalizes Understanding, Needs Reinforcement    Mobility Training, taught by MARVEL Perdomo at 9/26/2024  4:47 PM.  Learner: Patient  Readiness: Acceptance  Method: Explanation  Response: Verbalizes Understanding, Needs Reinforcement    Education Comments  No comments found.

## 2024-09-27 LAB
ALBUMIN SERPL BCP-MCNC: 3.7 G/DL (ref 3.4–5)
ALBUMIN SERPL BCP-MCNC: 3.7 G/DL (ref 3.4–5)
AMPHETAMINES UR QL SCN: ABNORMAL
AMPHETAMINES UR QL SCN: ABNORMAL
ANION GAP SERPL CALC-SCNC: 13 MMOL/L (ref 10–20)
ANION GAP SERPL CALC-SCNC: 13 MMOL/L (ref 10–20)
BARBITURATES UR QL SCN: ABNORMAL
BARBITURATES UR QL SCN: ABNORMAL
BASOPHILS # BLD AUTO: 0.02 X10*3/UL (ref 0–0.1)
BASOPHILS # BLD AUTO: 0.02 X10*3/UL (ref 0–0.1)
BASOPHILS NFR BLD AUTO: 0.6 %
BASOPHILS NFR BLD AUTO: 0.6 %
BENZODIAZ UR QL SCN: ABNORMAL
BENZODIAZ UR QL SCN: ABNORMAL
BUN SERPL-MCNC: 8 MG/DL (ref 6–23)
BUN SERPL-MCNC: 8 MG/DL (ref 6–23)
BZE UR QL SCN: ABNORMAL
BZE UR QL SCN: ABNORMAL
CALCIUM SERPL-MCNC: 8.9 MG/DL (ref 8.6–10.6)
CALCIUM SERPL-MCNC: 8.9 MG/DL (ref 8.6–10.6)
CANNABINOIDS UR QL SCN: ABNORMAL
CANNABINOIDS UR QL SCN: ABNORMAL
CHLORIDE SERPL-SCNC: 101 MMOL/L (ref 98–107)
CHLORIDE SERPL-SCNC: 101 MMOL/L (ref 98–107)
CK SERPL-CCNC: 2598 U/L (ref 0–325)
CK SERPL-CCNC: 2598 U/L (ref 0–325)
CO2 SERPL-SCNC: 27 MMOL/L (ref 21–32)
CO2 SERPL-SCNC: 27 MMOL/L (ref 21–32)
CREAT SERPL-MCNC: 0.79 MG/DL (ref 0.5–1.3)
CREAT SERPL-MCNC: 0.79 MG/DL (ref 0.5–1.3)
EGFRCR SERPLBLD CKD-EPI 2021: >90 ML/MIN/1.73M*2
EGFRCR SERPLBLD CKD-EPI 2021: >90 ML/MIN/1.73M*2
EOSINOPHIL # BLD AUTO: 0.07 X10*3/UL (ref 0–0.4)
EOSINOPHIL # BLD AUTO: 0.07 X10*3/UL (ref 0–0.4)
EOSINOPHIL NFR BLD AUTO: 2 %
EOSINOPHIL NFR BLD AUTO: 2 %
ERYTHROCYTE [DISTWIDTH] IN BLOOD BY AUTOMATED COUNT: 13.6 % (ref 11.5–14.5)
ERYTHROCYTE [DISTWIDTH] IN BLOOD BY AUTOMATED COUNT: 13.6 % (ref 11.5–14.5)
FENTANYL+NORFENTANYL UR QL SCN: ABNORMAL
FENTANYL+NORFENTANYL UR QL SCN: ABNORMAL
GLUCOSE BLD MANUAL STRIP-MCNC: 124 MG/DL (ref 74–99)
GLUCOSE BLD MANUAL STRIP-MCNC: 124 MG/DL (ref 74–99)
GLUCOSE BLD MANUAL STRIP-MCNC: 127 MG/DL (ref 74–99)
GLUCOSE BLD MANUAL STRIP-MCNC: 127 MG/DL (ref 74–99)
GLUCOSE BLD MANUAL STRIP-MCNC: 147 MG/DL (ref 74–99)
GLUCOSE BLD MANUAL STRIP-MCNC: 147 MG/DL (ref 74–99)
GLUCOSE BLD MANUAL STRIP-MCNC: 175 MG/DL (ref 74–99)
GLUCOSE BLD MANUAL STRIP-MCNC: 175 MG/DL (ref 74–99)
GLUCOSE SERPL-MCNC: 126 MG/DL (ref 74–99)
GLUCOSE SERPL-MCNC: 126 MG/DL (ref 74–99)
HCT VFR BLD AUTO: 41.7 % (ref 41–52)
HCT VFR BLD AUTO: 41.7 % (ref 41–52)
HGB BLD-MCNC: 13.6 G/DL (ref 13.5–17.5)
HGB BLD-MCNC: 13.6 G/DL (ref 13.5–17.5)
IMM GRANULOCYTES # BLD AUTO: 0.01 X10*3/UL (ref 0–0.5)
IMM GRANULOCYTES # BLD AUTO: 0.01 X10*3/UL (ref 0–0.5)
IMM GRANULOCYTES NFR BLD AUTO: 0.3 % (ref 0–0.9)
IMM GRANULOCYTES NFR BLD AUTO: 0.3 % (ref 0–0.9)
LYMPHOCYTES # BLD AUTO: 0.7 X10*3/UL (ref 0.8–3)
LYMPHOCYTES # BLD AUTO: 0.7 X10*3/UL (ref 0.8–3)
LYMPHOCYTES NFR BLD AUTO: 19.8 %
LYMPHOCYTES NFR BLD AUTO: 19.8 %
MAGNESIUM SERPL-MCNC: 2.11 MG/DL (ref 1.6–2.4)
MAGNESIUM SERPL-MCNC: 2.11 MG/DL (ref 1.6–2.4)
MCH RBC QN AUTO: 28.1 PG (ref 26–34)
MCH RBC QN AUTO: 28.1 PG (ref 26–34)
MCHC RBC AUTO-ENTMCNC: 32.6 G/DL (ref 32–36)
MCHC RBC AUTO-ENTMCNC: 32.6 G/DL (ref 32–36)
MCV RBC AUTO: 86 FL (ref 80–100)
MCV RBC AUTO: 86 FL (ref 80–100)
METHADONE UR QL SCN: ABNORMAL
METHADONE UR QL SCN: ABNORMAL
MONOCYTES # BLD AUTO: 0.37 X10*3/UL (ref 0.05–0.8)
MONOCYTES # BLD AUTO: 0.37 X10*3/UL (ref 0.05–0.8)
MONOCYTES NFR BLD AUTO: 10.5 %
MONOCYTES NFR BLD AUTO: 10.5 %
NEUTROPHILS # BLD AUTO: 2.36 X10*3/UL (ref 1.6–5.5)
NEUTROPHILS # BLD AUTO: 2.36 X10*3/UL (ref 1.6–5.5)
NEUTROPHILS NFR BLD AUTO: 66.8 %
NEUTROPHILS NFR BLD AUTO: 66.8 %
NRBC BLD-RTO: 0 /100 WBCS (ref 0–0)
NRBC BLD-RTO: 0 /100 WBCS (ref 0–0)
OPIATES UR QL SCN: ABNORMAL
OPIATES UR QL SCN: ABNORMAL
OXYCODONE+OXYMORPHONE UR QL SCN: ABNORMAL
OXYCODONE+OXYMORPHONE UR QL SCN: ABNORMAL
PCP UR QL SCN: ABNORMAL
PCP UR QL SCN: ABNORMAL
PHOSPHATE SERPL-MCNC: 2 MG/DL (ref 2.5–4.9)
PHOSPHATE SERPL-MCNC: 2 MG/DL (ref 2.5–4.9)
PLATELET # BLD AUTO: 158 X10*3/UL (ref 150–450)
PLATELET # BLD AUTO: 158 X10*3/UL (ref 150–450)
POTASSIUM SERPL-SCNC: 3.7 MMOL/L (ref 3.5–5.3)
POTASSIUM SERPL-SCNC: 3.7 MMOL/L (ref 3.5–5.3)
RBC # BLD AUTO: 4.84 X10*6/UL (ref 4.5–5.9)
RBC # BLD AUTO: 4.84 X10*6/UL (ref 4.5–5.9)
SODIUM SERPL-SCNC: 137 MMOL/L (ref 136–145)
SODIUM SERPL-SCNC: 137 MMOL/L (ref 136–145)
WBC # BLD AUTO: 3.5 X10*3/UL (ref 4.4–11.3)
WBC # BLD AUTO: 3.5 X10*3/UL (ref 4.4–11.3)

## 2024-09-27 PROCEDURE — 2500000001 HC RX 250 WO HCPCS SELF ADMINISTERED DRUGS (ALT 637 FOR MEDICARE OP)

## 2024-09-27 PROCEDURE — 2500000001 HC RX 250 WO HCPCS SELF ADMINISTERED DRUGS (ALT 637 FOR MEDICARE OP): Performed by: STUDENT IN AN ORGANIZED HEALTH CARE EDUCATION/TRAINING PROGRAM

## 2024-09-27 PROCEDURE — 83735 ASSAY OF MAGNESIUM: CPT

## 2024-09-27 PROCEDURE — 97110 THERAPEUTIC EXERCISES: CPT | Mod: GP,CQ

## 2024-09-27 PROCEDURE — 1200000002 HC GENERAL ROOM WITH TELEMETRY DAILY

## 2024-09-27 PROCEDURE — 80307 DRUG TEST PRSMV CHEM ANLYZR: CPT | Performed by: INTERNAL MEDICINE

## 2024-09-27 PROCEDURE — 97165 OT EVAL LOW COMPLEX 30 MIN: CPT | Mod: GO

## 2024-09-27 PROCEDURE — 99233 SBSQ HOSP IP/OBS HIGH 50: CPT | Performed by: STUDENT IN AN ORGANIZED HEALTH CARE EDUCATION/TRAINING PROGRAM

## 2024-09-27 PROCEDURE — 82436 ASSAY OF URINE CHLORIDE: CPT | Performed by: STUDENT IN AN ORGANIZED HEALTH CARE EDUCATION/TRAINING PROGRAM

## 2024-09-27 PROCEDURE — 2500000004 HC RX 250 GENERAL PHARMACY W/ HCPCS (ALT 636 FOR OP/ED): Performed by: STUDENT IN AN ORGANIZED HEALTH CARE EDUCATION/TRAINING PROGRAM

## 2024-09-27 PROCEDURE — 85025 COMPLETE CBC W/AUTO DIFF WBC: CPT

## 2024-09-27 PROCEDURE — 84100 ASSAY OF PHOSPHORUS: CPT

## 2024-09-27 PROCEDURE — 99214 OFFICE O/P EST MOD 30 MIN: CPT | Performed by: OTOLARYNGOLOGY

## 2024-09-27 PROCEDURE — 99204 OFFICE O/P NEW MOD 45 MIN: CPT | Performed by: OTOLARYNGOLOGY

## 2024-09-27 PROCEDURE — 2500000004 HC RX 250 GENERAL PHARMACY W/ HCPCS (ALT 636 FOR OP/ED)

## 2024-09-27 PROCEDURE — 36415 COLL VENOUS BLD VENIPUNCTURE: CPT | Performed by: STUDENT IN AN ORGANIZED HEALTH CARE EDUCATION/TRAINING PROGRAM

## 2024-09-27 PROCEDURE — 83935 ASSAY OF URINE OSMOLALITY: CPT | Performed by: STUDENT IN AN ORGANIZED HEALTH CARE EDUCATION/TRAINING PROGRAM

## 2024-09-27 PROCEDURE — 2500000002 HC RX 250 W HCPCS SELF ADMINISTERED DRUGS (ALT 637 FOR MEDICARE OP, ALT 636 FOR OP/ED)

## 2024-09-27 PROCEDURE — 97116 GAIT TRAINING THERAPY: CPT | Mod: GP,CQ

## 2024-09-27 PROCEDURE — 82550 ASSAY OF CK (CPK): CPT | Performed by: STUDENT IN AN ORGANIZED HEALTH CARE EDUCATION/TRAINING PROGRAM

## 2024-09-27 PROCEDURE — 97530 THERAPEUTIC ACTIVITIES: CPT | Mod: GP,CQ

## 2024-09-27 PROCEDURE — 82947 ASSAY GLUCOSE BLOOD QUANT: CPT

## 2024-09-27 PROCEDURE — 99222 1ST HOSP IP/OBS MODERATE 55: CPT | Performed by: NURSE PRACTITIONER

## 2024-09-27 PROCEDURE — 80069 RENAL FUNCTION PANEL: CPT

## 2024-09-27 SDOH — HEALTH STABILITY: MENTAL HEALTH: HOW MANY STANDARD DRINKS CONTAINING ALCOHOL DO YOU HAVE ON A TYPICAL DAY?: PATIENT UNABLE TO ANSWER

## 2024-09-27 SDOH — ECONOMIC STABILITY: FOOD INSECURITY
WITHIN THE PAST 12 MONTHS, YOU WORRIED THAT YOUR FOOD WOULD RUN OUT BEFORE YOU GOT MONEY TO BUY MORE.: PATIENT UNABLE TO ANSWER

## 2024-09-27 SDOH — ECONOMIC STABILITY: FOOD INSECURITY
WITHIN THE PAST 12 MONTHS, THE FOOD YOU BOUGHT JUST DIDN'T LAST AND YOU DIDN'T HAVE MONEY TO GET MORE.: PATIENT UNABLE TO ANSWER

## 2024-09-27 SDOH — SOCIAL STABILITY: SOCIAL INSECURITY
WITHIN THE LAST YEAR, HAVE YOU BEEN KICKED, HIT, SLAPPED, OR OTHERWISE PHYSICALLY HURT BY YOUR PARTNER OR EX-PARTNER?: PATIENT UNABLE TO ANSWER

## 2024-09-27 SDOH — ECONOMIC STABILITY: TRANSPORTATION INSECURITY
IN THE PAST 12 MONTHS, HAS LACK OF TRANSPORTATION KEPT YOU FROM MEETINGS, WORK, OR FROM GETTING THINGS NEEDED FOR DAILY LIVING?: PATIENT UNABLE TO ANSWER

## 2024-09-27 SDOH — ECONOMIC STABILITY: INCOME INSECURITY
IN THE LAST 12 MONTHS, WAS THERE A TIME WHEN YOU WERE NOT ABLE TO PAY THE MORTGAGE OR RENT ON TIME?: PATIENT UNABLE TO ANSWER

## 2024-09-27 SDOH — SOCIAL STABILITY: SOCIAL INSECURITY
WITHIN THE LAST YEAR, HAVE TO BEEN RAPED OR FORCED TO HAVE ANY KIND OF SEXUAL ACTIVITY BY YOUR PARTNER OR EX-PARTNER?: PATIENT UNABLE TO ANSWER

## 2024-09-27 SDOH — HEALTH STABILITY: MENTAL HEALTH
HOW OFTEN DO YOU NEED TO HAVE SOMEONE HELP YOU WHEN YOU READ INSTRUCTIONS, PAMPHLETS, OR OTHER WRITTEN MATERIAL FROM YOUR DOCTOR OR PHARMACY?: PATIENT UNABLE TO RESPOND

## 2024-09-27 SDOH — ECONOMIC STABILITY: INCOME INSECURITY
HOW HARD IS IT FOR YOU TO PAY FOR THE VERY BASICS LIKE FOOD, HOUSING, MEDICAL CARE, AND HEATING?: PATIENT UNABLE TO ANSWER

## 2024-09-27 SDOH — SOCIAL STABILITY: SOCIAL INSECURITY: WITHIN THE LAST YEAR, HAVE YOU BEEN AFRAID OF YOUR PARTNER OR EX-PARTNER?: PATIENT UNABLE TO ANSWER

## 2024-09-27 SDOH — ECONOMIC STABILITY: HOUSING INSECURITY: AT ANY TIME IN THE PAST 12 MONTHS, WERE YOU HOMELESS OR LIVING IN A SHELTER (INCLUDING NOW)?: PATIENT UNABLE TO ANSWER

## 2024-09-27 SDOH — ECONOMIC STABILITY: TRANSPORTATION INSECURITY
IN THE PAST 12 MONTHS, HAS THE LACK OF TRANSPORTATION KEPT YOU FROM MEDICAL APPOINTMENTS OR FROM GETTING MEDICATIONS?: PATIENT UNABLE TO ANSWER

## 2024-09-27 SDOH — HEALTH STABILITY: MENTAL HEALTH: HOW OFTEN DO YOU HAVE A DRINK CONTAINING ALCOHOL?: PATIENT UNABLE TO ANSWER

## 2024-09-27 SDOH — ECONOMIC STABILITY: HOUSING INSECURITY: IN THE PAST 12 MONTHS, HOW MANY TIMES HAVE YOU MOVED WHERE YOU WERE LIVING?: 0

## 2024-09-27 SDOH — ECONOMIC STABILITY: INCOME INSECURITY
IN THE PAST 12 MONTHS, HAS THE ELECTRIC, GAS, OIL, OR WATER COMPANY THREATENED TO SHUT OFF SERVICE IN YOUR HOME?: PATIENT UNABLE TO ANSWER

## 2024-09-27 SDOH — SOCIAL STABILITY: SOCIAL INSECURITY
WITHIN THE LAST YEAR, HAVE YOU BEEN HUMILIATED OR EMOTIONALLY ABUSED IN OTHER WAYS BY YOUR PARTNER OR EX-PARTNER?: PATIENT UNABLE TO ANSWER

## 2024-09-27 SDOH — HEALTH STABILITY: MENTAL HEALTH: HOW OFTEN DO YOU HAVE 6 OR MORE DRINKS ON ONE OCCASION?: PATIENT UNABLE TO ANSWER

## 2024-09-27 ASSESSMENT — PAIN SCALES - GENERAL
PAINLEVEL_OUTOF10: 8
PAINLEVEL_OUTOF10: 0 - NO PAIN
PAINLEVEL_OUTOF10: 8

## 2024-09-27 ASSESSMENT — ACTIVITIES OF DAILY LIVING (ADL)
DRESSING YOURSELF: NEEDS ASSISTANCE
JUDGMENT_ADEQUATE_SAFELY_COMPLETE_DAILY_ACTIVITIES: YES
TOILETING: INDEPENDENT
ADEQUATE_TO_COMPLETE_ADL: YES
GROOMING: NEEDS ASSISTANCE
PATIENT'S MEMORY ADEQUATE TO SAFELY COMPLETE DAILY ACTIVITIES?: YES
HEARING - LEFT EAR: FUNCTIONAL
WALKS IN HOME: NEEDS ASSISTANCE
BATHING_ASSISTANCE: MODERATE
BATHING: NEEDS ASSISTANCE
FEEDING YOURSELF: INDEPENDENT
ADL_ASSISTANCE: NEEDS ASSISTANCE
HEARING - RIGHT EAR: FUNCTIONAL
ASSISTIVE_DEVICE: WALKER

## 2024-09-27 ASSESSMENT — COGNITIVE AND FUNCTIONAL STATUS - GENERAL
EATING MEALS: A LITTLE
CLIMB 3 TO 5 STEPS WITH RAILING: A LOT
HELP NEEDED FOR BATHING: A LITTLE
DRESSING REGULAR LOWER BODY CLOTHING: A LOT
MOBILITY SCORE: 17
MOBILITY SCORE: 17
DRESSING REGULAR UPPER BODY CLOTHING: A LOT
CLIMB 3 TO 5 STEPS WITH RAILING: TOTAL
WALKING IN HOSPITAL ROOM: A LOT
DRESSING REGULAR LOWER BODY CLOTHING: A LITTLE
MOVING TO AND FROM BED TO CHAIR: A LITTLE
MOVING FROM LYING ON BACK TO SITTING ON SIDE OF FLAT BED WITH BEDRAILS: A LITTLE
STANDING UP FROM CHAIR USING ARMS: A LITTLE
WALKING IN HOSPITAL ROOM: A LITTLE
MOVING TO AND FROM BED TO CHAIR: A LITTLE
HELP NEEDED FOR BATHING: A LOT
STANDING UP FROM CHAIR USING ARMS: A LITTLE
DRESSING REGULAR UPPER BODY CLOTHING: A LITTLE
DAILY ACTIVITIY SCORE: 19
PERSONAL GROOMING: A LITTLE
DAILY ACTIVITIY SCORE: 15
TOILETING: A LITTLE
TURNING FROM BACK TO SIDE WHILE IN FLAT BAD: A LITTLE
TOILETING: A LOT

## 2024-09-27 ASSESSMENT — PAIN DESCRIPTION - LOCATION
LOCATION: GENERALIZED
LOCATION: GENERALIZED

## 2024-09-27 ASSESSMENT — PAIN - FUNCTIONAL ASSESSMENT
PAIN_FUNCTIONAL_ASSESSMENT: 0-10

## 2024-09-27 ASSESSMENT — LIFESTYLE VARIABLES
SKIP TO QUESTIONS 9-10: 0
AUDIT-C TOTAL SCORE: -1

## 2024-09-27 NOTE — PROGRESS NOTES
Assessment/Plan   Seven Robin is a 74 y.o. male with PMHx of HTN, HLD, DM, cirrhosis 2/2 treated Hep C (Tx [id number]), BPH, schizoaffective disorder and prostate CA s/p EBRT presenting to the ED s/p mechanical fall leading to trauma activation. Pt reports he was standing on a table when he fell backwards hitting his head last night. No proceeding symptoms, no b/b incontinence, or AMS post-event. Has a large postior scalp contusion. No new neuro deficits. Denies any other associated symptoms other than pain in his head and back. Trauma eval was negative, imaging neg for fracture or bleed. Had lactic acidosis and rhabdo, improving s/p fluids. Trop with mild elevation, no EKG new changes or clinical anginal equivalents at this time..     # Mechanical Fall  #posterior scalp contusoin  - no bleed or fractures identified, trauma evaluated and signed off  - lactic acidosis and elevated cpk noted, improving.   - no deficts, no mentation changes.   - denies chest pain or equivalents but trop with slight uptrend pending repeat.  Low concern for acs  - Tylenlol and oxycodone 5 mg for pain   - pt/ot eval, mod intesnity, is a 2 person assist.   - wound care cosult    #large posterior scalp unstagable pressure injury  #SDH, 3mm read as subacute to chronic.   - wound care ordered  - ENT facial trauma consulted, appreciate recs. Have discussed with surgery and plastics and now reported ent is covering this issue.   - neurosurgery consult  - hold asa and loveneox    #rhabdomyolysis   - CK elevated to 3,189, lactate 6.9 -> 2.9 s/p IVF   -Trend CK, still downtrending but still at 2.5k, will increase fluids to 200cc/hr. No resp complications at this time.     #troponin elevation  - mild uptrend  - pending repeat  - no angina or equivalents  - low concern for acs, likely related to demand     #HTN   #HLD  -On lovastatin, triamterene-hydroCHLOROthiazide 37.5-25 mg daily, and ASA 81mg   -C/w ASA 81 mg daily   -Holding statin iso c/f  "rhabo   -bp controlled  - plan to check orthostatics tomorrow.      #DM   -Check A1c: 6.5  -Holding home Metformin 500mg BID   -Start SSI 1:50 for glc >150     #Schizoaffective disorder  #MDD   #insomnia   -On propanolol 10 mg, Risperidone 2.5 mg tablet and trazodone 100 mg tablet   -C/w Risperidone 2.5 mg and Trazodone 100mg      #BPH  -C/w Tamsulosin      Scheduled outpatient appointments in system:   No future appointments.  ---------------------------------------------------------------------------------------------------  Subjective   No new events.  Patient denies new complaints.  Facial swelling is improving. Continues to have a headache. Appears overall to still be cognitively somewhat less than baseline but still decisional. Continues to have headache. Reports vision and voice is improving. Was 2 person assist with therapy.     ---------------------------------------------------------------------------------------------------  Objective   Last Recorded Vitals  Blood pressure 173/90, pulse 75, temperature 36.9 °C (98.4 °F), resp. rate 17, height 1.803 m (5' 11\"), weight 94.3 kg (208 lb), SpO2 99%.  Intake/Output last 3 Shifts:  I/O last 3 completed shifts:  In: 2760 (29.3 mL/kg) [I.V.:2760 (29.3 mL/kg)]  Out: 200 (2.1 mL/kg) [Urine:200 (0.1 mL/kg/hr)]  Weight: 94.3 kg     Physical Exam  Vitals and nursing note reviewed.   Constitutional:       General: He is not in acute distress.     Appearance: Normal appearance. He is not ill-appearing or toxic-appearing.   HENT:      Head: Normocephalic and atraumatic.      Comments: Posterior scalp contusion covered with mepilex    Facial swelling and lip swelling, present on admission, noted some facial assymetry and possible subtle weakness on left side face. Sensation intact.      Mouth/Throat:      Mouth: Mucous membranes are moist.   Eyes:      General: No scleral icterus.     Extraocular Movements: Extraocular movements intact.      Conjunctiva/sclera: " Conjunctivae normal.   Cardiovascular:      Rate and Rhythm: Normal rate and regular rhythm.      Heart sounds: S1 normal and S2 normal. No murmur heard.  Pulmonary:      Effort: Pulmonary effort is normal. No respiratory distress.      Breath sounds: No wheezing, rhonchi or rales.   Abdominal:      General: Bowel sounds are normal. There is no distension.      Palpations: Abdomen is soft.      Tenderness: There is no abdominal tenderness. There is no guarding or rebound.   Musculoskeletal:         General: No swelling or deformity.      Cervical back: Neck supple.   Skin:     General: Skin is warm and dry.      Findings: No rash.   Neurological:      Mental Status: He is alert. Mental status is at baseline.      Comments: Facial asymmetry as above. Strenth and senstation otherwise symmetric without focal deficit.    Psychiatric:         Mood and Affect: Mood normal.         Relevant Results  Lab Results   Component Value Date    WBC 5.2 09/26/2024    HGB 13.6 09/26/2024    HCT 40.0 (L) 09/26/2024    MCV 84 09/26/2024     09/26/2024      Lab Results   Component Value Date    GLUCOSE 125 (H) 09/26/2024    CALCIUM 8.7 09/26/2024     (L) 09/26/2024    K 4.1 09/26/2024    CO2 26 09/26/2024    CL 99 09/26/2024    BUN 11 09/26/2024    CREATININE 0.90 09/26/2024     Scheduled medications  aspirin, 81 mg, oral, Daily  enoxaparin, 40 mg, subcutaneous, q24h  insulin lispro, 0-5 Units, subcutaneous, TID  iohexol, 150 mL, intravenous, Once in imaging  melatonin, 10 mg, oral, Nightly  polyethylene glycol, 17 g, oral, Daily  risperiDONE, 2.5 mg, oral, Daily  tamsulosin, 0.4 mg, oral, Daily  traZODone, 100 mg, oral, Nightly      Continuous medications  sodium chloride 0.9%, 150 mL/hr, Last Rate: 150 mL/hr (09/27/24 0849)      PRN medications  PRN medications: acetaminophen, dextrose, dextrose, glucagon, glucagon, oxyCODONE    Randy Patino MD

## 2024-09-27 NOTE — CARE PLAN
The patient's goals for the shift include  decreasing pain    The clinical goals for the shift include patient will remain safe and free from falls entire shift

## 2024-09-27 NOTE — PROGRESS NOTES
Physical Therapy    Physical Therapy Treatment    Patient Name: Seven Robin  MRN: [id number]  Department: [place] [address]  Room: [facility]5071-  Today's Date: 9/27/2024  Time Calculation  Start Time: 1059  Stop Time: 1144  Time Calculation (min): 45 min         Assessment/Plan   PT Assessment  Barriers to Discharge: none  End of Session Communication: Bedside nurse  Assessment Comment: Pt.'s chief complaint is pain /soreness from fall. Pt. however was participatory and was able to complete all ther ex, bed mob, transfers and amb with rest breaks. Pt. also willing to sit up in chair end of session. Set alarm pad underneath pt. and pair the alarm. Pt. showing improvement vs last session.  End of Session Patient Position: Up in chair, Alarm on  PT Plan  Inpatient/Swing Bed or Outpatient: Inpatient  PT Plan  Treatment/Interventions: Bed mobility, Transfer training, Gait training, Stair training, Balance training, Strengthening, Endurance training, Therapeutic activity, Therapeutic exercise  PT Plan: Ongoing PT  PT Frequency: 3 times per week  PT Discharge Recommendations:  (At this time pt. has been recommended to discharge to a inpt. facility post acute - Will continue to assess the necessity.)  Equipment Recommended upon Discharge: Wheeled walker  PT Recommended Transfer Status: Assist x1  PT - OK to Discharge: Yes      General Visit Information:   PT  Visit  PT Received On: 09/27/24  General  Reason for Referral: s/p mechanical fall leading to trauma activation. Pt reports he was standing on a table when he fell backwards hitting his head. Down for 12 hours before being found. Posterior scalp contusion, with obvious scalp deformity and unstageable pressure injury  Past Medical History Relevant to Rehab: HTN, HLD, DM, cirrhosis 2/2 treated Hep C (Tx 5961-3898), BPH, schizoaffective disorder and prostate CA s/p EBRT  Missed Visit: No  Missed Visit Reason:  ((.))  Family/Caregiver Present: No  Caregiver Feedback: (.)  Patient  Position Received: Bed, 3 rail up  General Comment: Pt. supine in bed upon arrival. Pt. reports an 8 out of 10 pain rating in back shoulders and head. RN alerted who addressed with pain meds. Pt. willing to participate despite pain.    Subjective   Precautions:  Precautions  Precautions Comment: Pt reports 2 falls in the past 6mo    Vital Signs (Past 2hrs)        Date/Time Vitals Session Patient Position Pulse Resp SpO2 BP MAP (mmHg)    09/27/24 11:43:19 --  --  94  18  95 %  163/94  117                         Objective   Pain:  Pain Assessment  Pain Assessment: 0-10  0-10 (Numeric) Pain Score: 8  Cognition:  Cognition  Orientation Level: Oriented X4  Coordination:  Movements are Fluid and Coordinated: Yes  Postural Control:  Postural Control  Postural Control: Within Functional Limits  Static Sitting Balance  Static Sitting-Balance Support: Feet supported  Static Sitting-Level of Assistance: Distant supervision  Static Standing Balance  Static Standing-Balance Support: Bilateral upper extremity supported  Static Standing-Level of Assistance: Contact guard  Extremity/Trunk Assessments:                      Activity Tolerance:  Activity Tolerance  Endurance:  (Rest breaks given during session.)  Treatments:  Therapeutic Exercise  Therapeutic Exercise Performed: Yes  Therapeutic Exercise Activity 1: Supine bilat le ex AP'S, QS, SAQ'S, HS, AND ABD X 15 REPS.    Therapeutic Activity  Therapeutic Activity Performed: Yes  Therapeutic Activity 1: Increased time sitting EOB to allow for pt. to get used to change in positioning. No complaints of lightheadedness or dizziness.    Bed Mobility  Bed Mobility: Yes  Bed Mobility 1  Bed Mobility 1: Rolling right, Side lying right to sit  Level of Assistance 1: Minimum assistance  Bed Mobility Comments 1: Min assist to roll and min assist to sidelying to sit.    Ambulation/Gait Training  Ambulation/Gait Training Performed: Yes  Ambulation/Gait Training 1  Surface 1:  (Pt. amb. 40'  x 2 using a wh. walker and close sba for safety. Cues to slow pace for safety)  Transfers  Transfer: Yes  Transfer 1  Transfer From 1: Sit to, Stand to  Transfer to 1: Stand, Sit  Transfer Level of Assistance 1: Contact guard  Trials/Comments 1: CGA to steady    Outcome Measures:  [facility] Basic Mobility  Turning from your back to your side while in a flat bed without using bedrails: A little  Moving from lying on your back to sitting on the side of a flat bed without using bedrails: A little  Moving to and from bed to chair (including a wheelchair): A little  Standing up from a chair using your arms (e.g. wheelchair or bedside chair): A little  To walk in hospital room: A little  Climbing 3-5 steps with railing: A lot  Basic Mobility - Total Score: 17    Education Documentation  Precautions, taught by Babita Gill PTA at 9/27/2024 12:00 PM.  Learner: Patient  Readiness: Acceptance  Method: Demonstration, Explanation  Response: Needs Reinforcement    Mobility Training, taught by Babita Gill PTA at 9/27/2024 12:00 PM.  Learner: Patient  Readiness: Acceptance  Method: Demonstration, Explanation  Response: Needs Reinforcement    Education Comments  No comments found.        OP EDUCATION:       Encounter Problems       Encounter Problems (Active)       Balance       Patient will score >/=20/28 on Tinetti to promote safety and prevent falls (Progressing)       Start:  09/26/24    Expected End:  10/10/24               Mobility       Patient will ambulate 150 ft with SBA and LRAD (Progressing)       Start:  09/26/24    Expected End:  10/10/24            Patient will ascend/ descend 8 steps with CGA and LRAD (Progressing)       Start:  09/26/24    Expected End:  10/10/24               PT Transfers       Patient will be independent with bed mobility (Progressing)       Start:  09/26/24    Expected End:  10/10/24            Patient will be SBA with sit to stand with LRAD (Progressing)       Start:  09/26/24    Expected End:   10/10/24               Pain - Adult

## 2024-09-27 NOTE — CARE PLAN
The patient's goals for the shift include  having decreased pain    The clinical goals for the shift include patient will remain safe and free from falls entire shift    Problem: Pain - Adult  Goal: Verbalizes/displays adequate comfort level or baseline comfort level  Outcome: Progressing     Problem: Safety - Adult  Goal: Free from fall injury  Outcome: Progressing     Problem: Discharge Planning  Goal: Discharge to home or other facility with appropriate resources  Outcome: Progressing     Problem: Chronic Conditions and Co-morbidities  Goal: Patient's chronic conditions and co-morbidity symptoms are monitored and maintained or improved  Outcome: Progressing     Problem: Pain  Goal: Takes deep breaths with improved pain control throughout the shift  Outcome: Progressing  Goal: Turns in bed with improved pain control throughout the shift  Outcome: Progressing  Goal: Walks with improved pain control throughout the shift  Outcome: Progressing  Goal: Performs ADL's with improved pain control throughout shift  Outcome: Progressing  Goal: Participates in PT with improved pain control throughout the shift  Outcome: Progressing  Goal: Free from opioid side effects throughout the shift  Outcome: Progressing  Goal: Free from acute confusion related to pain meds throughout the shift  Outcome: Progressing     Problem: Fall/Injury  Goal: Not fall by end of shift  Outcome: Progressing  Goal: Be free from injury by end of the shift  Outcome: Progressing  Goal: Verbalize understanding of personal risk factors for fall in the hospital  Outcome: Progressing  Goal: Verbalize understanding of risk factor reduction measures to prevent injury from fall in the home  Outcome: Progressing  Goal: Use assistive devices by end of the shift  Outcome: Progressing  Goal: Pace activities to prevent fatigue by end of the shift  Outcome: Progressing

## 2024-09-27 NOTE — PROGRESS NOTES
Transitional Care Coordination Progress Note:  Patient discussed during interdisciplinary rounds.  Team members present: MD, DEB  Plan per Medical/Surgical team: Pt presenting to the ED s/p mechanical fall leading to trauma activation. Plastics/Wound Care teams were consulted for large posterior scalp contusion/posterior scalp unstageable pressure injury. Additional teams: ENT for facial trauma, and Neuro-surgery for subdural hematoma, were consulted as well.  Payer: [employer]  Status: Inpatient  Discharge disposition: PT is recommending moderate intensity therapy post discharge, anticipate pt will discharge to a SNF when medically ready.  Potential Barriers: none  ADOD: 10/2  Attempted to meet with pt this afternoon to complete discharge planning assessment and discuss discharge dispo. On first attempt, pt was attempting to get out of bed (bed alarm on) to use the urinal, nursing staff arrived at bedside to assist him. I returned approximately 10 minutes later, this time team was at the bedside examining pt. Plan to attempt to meet with pt another time. Care coordinator will continue to follow for discharge planning needs.     Brad Mendez RN  Transitional Care Coordinator (TCC)  [phone] or [phone]

## 2024-09-27 NOTE — CONSULTS
Inpatient consult to Neurosurgery  Consult performed by: Deejay Trinidad MD  Consult ordered by: Randy Patino MD      Date of Service:  9/27/2024 Attending Provider:  Gretta Stoner DO     Reason for Consultation:  Seven Robin is being seen today for a consult requested by Gretta Stoner DO for subdural hemorrhage.    Subjective   History of Present Illness:  Seven is a 74 y.o. male with Fall, initial encounter    Patient reported that he was climbing on a table at home to reach something when he fell backwards and hit his head.  He was unable to get up and was therefore down for 12 hours.    Patient was found to have a posterior scalp lesion and was admitted for rhabdomyolysis.    Due to increased facial swelling and generalized weakness MRI brain was obtained.    Patient denied any headache, nausea or vomiting, vision changes at this time, weakness, numbness or tingling.       Review of Systems negative other than listed in HPI.    Objective   Vitals:  Vitals:    09/27/24 1143   BP: (!) 163/94   Pulse: 94   Resp: 18   Temp: 36.6 °C (97.9 °F)   SpO2: 95%         Exam:  Constitutional: No acute distress  Resp: breathing comfortably  Cardio: well perfused  GI: nondistended  MSK: full range of motion  Neuro: Awake, Ox3  face symmetric  RUE 5/5  LUE 5/5  RLE 5/5  LLE 5/5  sensation intact to light touch  Psych: appropriate  Skin: large posterior scalp wound covered with mepilex    Medical History  No past medical history on file.    Surgical History  No past surgical history on file.     Medications  Current Outpatient Medications   Medication Instructions    latanoprost (Xalatan) 0.005 % ophthalmic solution 1 drop, Both Eyes, Nightly    lovastatin (MEVACOR) 40 mg, oral, Nightly    metFORMIN XR (GLUCOPHAGE-XR) 1,000 mg, oral, 2 times daily (morning and late afternoon)    propranolol (INDERAL) 20 mg, oral, Daily PRN    risperiDONE (RISPERDAL) 1 mg, oral, Daily    traZODone (Desyrel) 100 mg tablet 1-2  tablets, oral, Nightly    triamterene-hydrochlorothiazid (Dyazide) 37.5-25 mg capsule 1 capsule, oral, Every morning        Diagnostic Results:    Lab Results   Component Value Date    WBC 3.5 (L) 09/27/2024    HGB 13.6 09/27/2024    HCT 41.7 09/27/2024    MCV 86 09/27/2024     09/27/2024     Lab Results   Component Value Date    CREATININE 0.79 09/27/2024    BUN 8 09/27/2024     09/27/2024    K 3.7 09/27/2024     09/27/2024    CO2 27 09/27/2024     Lab Results   Component Value Date    INR 1.2 (H) 09/24/2024    PROTIME 13.9 (H) 09/24/2024       === 09/24/24 ===    CT HEAD W/O CONTRAST TRAUMA PROTOCOL    - Impression -  No acute intracranial abnormality or calvarial fracture was  identified with significant scalp edema on a posttraumatic basis. No  acute fracture or posttraumatic subluxation of the vertebral column  was identified with findings as above.    MACRO:  None    Signed by: Mazin Jaimes 9/24/2024 11:39 AM  Dictation workstation:   [id number]  === 09/24/24 ===    MR BRAIN WO CONTRAST    - Impression -  1. No diffusion restriction abnormality is present to suggest an  acute infarct.  2. A FLAIR hyperintense, T1 isointense subdural hematoma is layering  between the right occipital lobe in the tentorium, measuring between  2-3 mm in thickness, with minimal mass effect on the adjacent brain  parenchyma, new/increased in size from prior CT head dated 09/24/2024.  3. A subtle T2 hyperintense collection overlies the right frontal  lobe, measuring up to 3 mm in maximal thickness, with minimal if any  mass effect on the adjacent brain parenchyma, and a 2-3 mm leftward  shift of midline structures. Collection demonstrates no associated  FLAIR hyperintensity or blooming on gradient echo and may represent a  more chronic hygroma.  4. Moderate diffuse brain parenchymal volume loss with patchy and  confluence areas of hyperintense FLAIR and T2 signal are present in  the periventricular and subcortical  white matter of bilateral  cerebral hemispheres, nonspecific findings favored to represent  sequela of microvascular disease.  5. Scalp swelling and edema slightly improved from prior CT dated  09/24/2024.    I personally reviewed the images/study and I agree with the findings  as stated by resident physician Dr. Ubaldo Cortes . This study  was interpreted at [facility],  [place], [place].    MACRO:  None    Signed by: Giovanni Valerio 9/27/2024 1:34 AM  Dictation workstation:   [id number]    Assessment/Plan   Assessment:  74yM h/o HTN, HLD, on ASA for cardioprotection, DM, cirrhosis 2/2 hepatitis C (treated [id number]), BPH, schizoaffective disorder, prostate CA s/p EBRT p/w mechanical fall, CTH thin R tentorial SDH, CTL spine neg, 9/26 increased face swelling, MRI thin R tentorial SDH stable from CTH.    Plan:  Hospitalist team primary  Patient does not need repeat imaging as CT and MRI findings are similar, patient without focal neurologic deficits  Hold aspirin, can restart post bleed day 5  Appreciate ENT /wound care recs for scalp lac  Ok for DVT ppx post bleed day 2  Patient does not need follow up  Neurosurgery will sign off.    Deejay Trinidad MD

## 2024-09-27 NOTE — PROGRESS NOTES
Occupational Therapy    Evaluation    Patient Name: Seven Robin  MRN: [id number]  Department: [facility] 50  Room: [facility]5071A  Today's Date: 9/27/2024  Time Calculation  Start Time: 1216  Stop Time: 1236  Time Calculation (min): 20 min        Assessment:  OT Assessment: Pt was motivated to engage in OT session. Pt demo good tolerance to engage in ADL tasks with SBA-Min A and functional mobility and transfers w Mod A x2. Pt is limited by decreased endurance, strength, activity tolerance, and balance. Pt would benefit from continued OT to address these deficits.  Prognosis: Good  Barriers to Discharge: None  Evaluation/Treatment Tolerance: Patient tolerated treatment well  End of Session Communication: PCT/NA/CTA, Physician (Physician in room at end of OT session)  End of Session Patient Position: Bed, 3 rail up, Alarm off, caregiver present  OT Assessment Results: Decreased ADL status, Decreased safe judgment during ADL, Decreased endurance, Decreased fine motor control, Decreased functional mobility  Prognosis: Good  Barriers to Discharge: None  Evaluation/Treatment Tolerance: Patient tolerated treatment well  Strengths: Attitude of self  Barriers to Participation: Comorbidities, Capable of completing ADLs semi/independent  Plan:  Treatment Interventions: ADL retraining, Functional transfer training, UE strengthening/ROM, Endurance training, Cognitive reorientation, Patient/family training, Equipment evaluation/education, Fine motor coordination activities  OT Frequency: 2 times per week  OT Discharge Recommendations: Moderate intensity level of continued care  OT Recommended Transfer Status: Moderate assist  OT - OK to Discharge: Yes  Treatment Interventions: ADL retraining, Functional transfer training, UE strengthening/ROM, Endurance training, Cognitive reorientation, Patient/family training, Equipment evaluation/education, Fine motor coordination activities    Subjective   Current Problem:  1. Fall, initial  encounter          General:  General  Reason for Referral: s/p mechanical fall leading to trauma activation. Pt reports he was standing on a table when he fell backwards hitting his head. Down for 12 hours before being found. Posterior scalp contusion, with obvious scalp deformity and unstageable pressure injury  Past Medical History Relevant to Rehab: HTN, HLD, DM, cirrhosis 2/2 treated Hep C (Tx [id number]), BPH, schizoaffective disorder and prostate CA s/p EBRT  Missed Visit: No  Family/Caregiver Present: No  Patient Position Received: Up in chair, Alarm off, not on at start of session  Preferred Learning Style: auditory, verbal  General Comment: Pt was alert and awake, seated in chair upon OT arrival. Pt was pleasant and agreeable to participate in OT session.  Precautions:  Medical Precautions: Fall precautions  Precautions Comment: Per PT note, Pt reports 2 falls in the past 6mo    Vital Sign (Past 2hrs)                 Pain:  Pain Assessment  Pain Assessment: 0-10  0-10 (Numeric) Pain Score: 0 - No pain  Response to Interventions: best at rest    Objective   Cognition:  Overall Cognitive Status: Within Functional Limits  Orientation Level: Oriented X4  Processing Speed: Delayed           Home Living:  Type of Home: House  Lives With: Other (Comment) (cousin)  Home Adaptive Equipment: Walker rolling or standard, Cane (pt IND at baseline, but has cane/walker if needed)  Home Layout: Multi-level, Laundry in basement, Bed/bath upstairs, Stairs to alternate level without rails  Alternate Level Stairs-Rails: None  Alternate Level Stairs-Number of Steps: 8  Home Access: Stairs to enter with rails  Entrance Stairs-Rails: Left  Entrance Stairs-Number of Steps: 4  Bathroom Shower/Tub: Tub/shower unit  Bathroom Toilet: Standard  Prior Function:  Level of [place]: Needs assistance with ADLs, Needs assistance with homemaking  Receives Help From: Other (Comment) (Cousin)  ADL Assistance: Needs assistance (pt stated his  cousin helps w dressing but he showers IND)  Homemaking Assistance: Needs assistance  Vocational:  (Per PT note, pt and cousin do not work)  Leisure: per PT note, watch baseball (guardians)  Hand Dominance: Right  Prior Function Comments: Pt reports cousin does most cooking, cleaning, laundry, and helps pt get dressed.  IADL History:  Homemaking Responsibilities: No  Current License: Yes  Mode of Transportation: Car  Occupation: Unemployed (Per PT note, pt and cousin do not work)  Leisure and Hobbies: per PT note, watch baseball (guardians)  ADL:  Eating Assistance: Independent (anticipated)  Grooming Assistance: Stand by (anticipated)  Grooming Deficit: Steadying, Verbal cueing, Supervision/safety  Bathing Assistance: Moderate (anticipated)  Bathing Deficit: Steadying, Verbal cueing, Supervision/safety, Increased time to complete   UE Dressing Assistance: Minimal (anticipated)  UE Dressing Deficit: Steadying, Verbal cueing, Supervision/safety, Increased time to complete, Pull around back  LE Dressing Assistance: Moderate  LE Dressing Deficit: Verbal cueing, Supervision/safety, Increased time to complete, Thread RLE into underwear, Thread LLE into underwear, Thread RLE into pants, Thread LLE into pants (don/doff R sock w SBA while seated in chair)  Toileting Assistance with Device: Moderate (anticipated)  Toileting Deficit: Steadying, Verbal cueing, Supervison/safety, Increased time to complete  Activity Tolerance:  Endurance: Tolerates 10 - 20 min exercise with multiple rests  Bed Mobility/Transfers: Bed Mobility  Bed Mobility: Yes  Bed Mobility 1  Bed Mobility 1: Sitting to supine  Level of Assistance 1: Moderate assistance  Bed Mobility Comments 1: Mod A x2 sitting at EOB to supine to swing BLEs over EOB and boost in bed    Transfers  Transfer: Yes  Transfer 1  Transfer From 1: Sit to, Stand to  Transfer to 1: Stand, Sit  Technique 1: Sit to stand, Stand to sit  Transfer Device 1: Walker  Transfer Level of  Assistance 1: Moderate assistance, Maximum verbal cues  Trials/Comments 1: Mod A sit<>stand w FWW for balance and technique      Functional Mobility:  Functional Mobility  Functional Mobility Performed: Yes  Functional Mobility 1  Device 1: Rolling walker  Assistance 1: Moderate assistance, Maximum verbal cues  Quality of Functional Mobility 1:  (shuffled gait)  Comments 1: Mod Ax2, Max verbal cues for BUE/BLE placement and technique to step from chair to bed w FWW.  Sitting Balance:  Static Sitting Balance  Static Sitting-Balance Support: No upper extremity supported  Static Sitting-Level of Assistance: Close supervision  Standing Balance:  Dynamic Standing Balance  Dynamic Standing-Balance Support: Bilateral upper extremity supported  Dynamic Standing-Level of Assistance: Moderate assistance        Vision:Vision - Basic Assessment  Current Vision: No visual deficits  Sensation:  Light Touch: No apparent deficits  Strength:  Strength Comments: BUE WFL, evidenced throughout engagement in ADL tasks and functional mobility  Perception:  Inattention/Neglect: Appears intact  Coordination:  Movements are Fluid and Coordinated: Yes   Hand Function:  Gross Grasp: Functional  Coordination: Functional  Extremities: RUE   RUE : Within Functional Limits and LUE   LUE: Within Functional Limits    Outcome Measures:[facility] Daily Activity  Putting on and taking off regular lower body clothing: A lot  Bathing (including washing, rinsing, drying): A lot  Putting on and taking off regular upper body clothing: A little  Toileting, which includes using toilet, bedpan or urinal: A lot  Taking care of personal grooming such as brushing teeth: A little  Eating Meals: A little  Daily Activity - Total Score: 15        Education Documentation  Body Mechanics, taught by oSumya Orellana OT at 9/27/2024  1:52 PM.  Learner: Patient  Readiness: Acceptance  Method: Explanation  Response: Verbalizes Understanding    ADL Training, taught by Soumya Orellana  OT at 9/27/2024  1:52 PM.  Learner: Patient  Readiness: Acceptance  Method: Explanation  Response: Verbalizes Understanding    Education Comments  No comments found.        OP EDUCATION:       Goals:  Encounter Problems       Encounter Problems (Active)       ADLs       Patient with complete upper body dressing with independent level of assistance donning and doffing all UE clothes with PRN adaptive equipment while edge of bed. (Progressing)       Start:  09/27/24    Expected End:  10/18/24            Patient with complete lower body dressing with modified independent level of assistance donning and doffing all LE clothes  with PRN adaptive equipment while edge of bed. (Progressing)       Start:  09/27/24    Expected End:  10/18/24            Patient will complete daily grooming tasks brushing teeth and washing face/hair with modified independent level of assistance and PRN adaptive equipment while standing. (Progressing)       Start:  09/27/24    Expected End:  10/18/24               MOBILITY       Patient will perform Functional mobility max Household distances/Community Distances with modified independent level of assistance and least restrictive device in order to improve safety and functional mobility. (Progressing)       Start:  09/27/24    Expected End:  10/18/24               TRANSFERS       Patient will complete sit to stand transfer with modified independent level of assistance and least restrictive device in order to improve safety and prepare for out of bed mobility. (Progressing)       Start:  09/27/24    Expected End:  10/18/24                      09/27/24 AT 2:06 PM JJ MURPHY OT REHAB OFFICE: [phone]

## 2024-09-27 NOTE — CONSULTS
"Nutrition Initial Assessment:   Nutrition Assessment    Reason for Assessment: Admission nursing screening    Patient is a 74 y.o. 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       Nutrition History:  Energy Intake: Fair 50-75 %  Food and Nutrient History: Pt reports appetite is fair-- states he eats 2 meals per day but knows he is eating less than he should be. Has someone preparing meals for him. Agreeable to consuming oral supplements. Very flat effect-- interview kept brief.  Food Allergies/Intolerances:  None  GI Symptoms: None  Oral Problems: None       Anthropometrics:  Height: 180.3 cm (5' 11\")   Weight: 94.3 kg (208 lb)   BMI (Calculated): 29.02  IBW/kg (Dietitian Calculated): 78.2 kg    Weight History:  Wt Readings from Last 20 Encounters:   09/24/24 94.3 kg (208 lb)   Reports UBW 86 kg    Weight Change %: wt up, above UBW       Nutrition Focused Physical Exam Findings:    Subcutaneous Fat Loss:   Orbital Fat Pads: Well nourished (slightly bulging fat pads)  Buccal Fat Pads: Well nourished (full, rounded cheeks)  Triceps: Well nourished (ample fat tissue)  Muscle Wasting:  Temporalis: Well nourished (well-defined muscle)  Pectoralis (Clavicular Region): Well nourished (clavicle not visible)  Deltoid/Trapezius: Well nourished (rounded appearance at arm, shoulder, neck)  Interosseous: Well nourished (muscle bulges)  Edema:  Edema Location: generalized edema noted during NFPE-- questions if this is masking muscle/fat loss  Physical Findings:  Skin:  (unstageable to back of head)    Nutrition Significant Labs:  BG POCT trend:   Results from last 7 days   Lab Units 09/27/24  0758 09/27/24  0306 09/26/24  2356 09/26/24  1657 09/26/24  1223   POCT GLUCOSE mg/dL 147* 124* 177* 132* 126*    , Renal Lab Trend:   Results from last 7 days   Lab Units " 09/26/24  0531 09/25/24  0607 09/24/24  1052 09/24/24  1052   POTASSIUM mmol/L 4.1 3.8  --  4.8   PHOSPHORUS mg/dL 2.8 3.3   < >  --    SODIUM mmol/L 135* 135*  --  137   MAGNESIUM mg/dL 2.16 2.06   < >  --    EGFR mL/min/1.73m*2 90 79  --  77   BUN mg/dL 11 13  --  12   CREATININE mg/dL 0.90 1.00  --  1.02    < > = values in this interval not displayed.        Nutrition Specific Medications:  Scheduled medications  insulin lispro, 0-5 Units, subcutaneous, TID  polyethylene glycol, 17 g, oral, Daily    Continuous medications  sodium chloride 0.9%, 150 mL/hr, Last Rate: 150 mL/hr (09/27/24 0849)      I/O:   Last BM Date: 09/25/24;      Dietary Orders (From admission, onward)       Start     Ordered    09/24/24 1537  Adult diet Regular  Diet effective now        Question:  Diet type  Answer:  Regular    09/24/24 1537                     Estimated Needs:   Total Energy Estimated Needs (kCal):  (4604-6788)  Method for Estimating Needs: 25-30 kcal/kg IBW  Total Protein Estimated Needs (g):  ()  Method for Estimating Needs: 1.2-1.4 g/kg IBW  Total Fluid Estimated Needs (mL):  (per team)        Nutrition Diagnosis   Malnutrition Diagnosis  Patient has Malnutrition Diagnosis: No (Suspect pt may have some degree of malnutriton however difficult to assess physical exam findings in terms of muscle/fat loss)    Nutrition Diagnosis  Patient has Nutrition Diagnosis: Yes  Diagnosis Status (1): New  Nutrition Diagnosis 1: Increased nutrient needs  Related to (1): increased metabolic demand  As Evidenced by (1): unstageable pressure area.       Nutrition Interventions/Recommendations         Nutrition Prescription:  Individualized Nutrition Prescription Provided for : oral diet + oral nutritional supplements        Nutrition Interventions:   Interventions: Meals and snacks, Medical food supplement  Meals and Snacks: General healthful diet  Goal: continue regular diet  Medical Food Supplement: Commercial beverage  Goal:  Christa BID (220 kcal, 10 g protein each)       Nutrition Monitoring and Evaluation   Food/Nutrient Related History Monitoring  Monitoring and Evaluation Plan: Amount of food  Amount of Food: Estimated amout of food, Medical food intake  Criteria: >50%      Biochemical Data, Medical Tests and Procedures  Monitoring and Evaluation Plan: Glucose/endocrine profile  Criteria: POC glucose <180 mg/dl      Time Spent (min): 45 minutes

## 2024-09-28 LAB
ALBUMIN SERPL BCP-MCNC: 3.4 G/DL (ref 3.4–5)
ALBUMIN SERPL BCP-MCNC: 3.4 G/DL (ref 3.4–5)
ANION GAP SERPL CALC-SCNC: 12 MMOL/L (ref 10–20)
ANION GAP SERPL CALC-SCNC: 12 MMOL/L (ref 10–20)
BASOPHILS # BLD AUTO: 0.02 X10*3/UL (ref 0–0.1)
BASOPHILS # BLD AUTO: 0.02 X10*3/UL (ref 0–0.1)
BASOPHILS NFR BLD AUTO: 0.5 %
BASOPHILS NFR BLD AUTO: 0.5 %
BUN SERPL-MCNC: 6 MG/DL (ref 6–23)
BUN SERPL-MCNC: 6 MG/DL (ref 6–23)
CALCIUM SERPL-MCNC: 8.4 MG/DL (ref 8.6–10.6)
CALCIUM SERPL-MCNC: 8.4 MG/DL (ref 8.6–10.6)
CHLORIDE SERPL-SCNC: 102 MMOL/L (ref 98–107)
CHLORIDE SERPL-SCNC: 102 MMOL/L (ref 98–107)
CO2 SERPL-SCNC: 24 MMOL/L (ref 21–32)
CO2 SERPL-SCNC: 24 MMOL/L (ref 21–32)
CREAT SERPL-MCNC: 0.77 MG/DL (ref 0.5–1.3)
CREAT SERPL-MCNC: 0.77 MG/DL (ref 0.5–1.3)
EGFRCR SERPLBLD CKD-EPI 2021: >90 ML/MIN/1.73M*2
EGFRCR SERPLBLD CKD-EPI 2021: >90 ML/MIN/1.73M*2
EOSINOPHIL # BLD AUTO: 0.09 X10*3/UL (ref 0–0.4)
EOSINOPHIL # BLD AUTO: 0.09 X10*3/UL (ref 0–0.4)
EOSINOPHIL NFR BLD AUTO: 2.3 %
EOSINOPHIL NFR BLD AUTO: 2.3 %
ERYTHROCYTE [DISTWIDTH] IN BLOOD BY AUTOMATED COUNT: 13.8 % (ref 11.5–14.5)
ERYTHROCYTE [DISTWIDTH] IN BLOOD BY AUTOMATED COUNT: 13.8 % (ref 11.5–14.5)
GLUCOSE BLD MANUAL STRIP-MCNC: 128 MG/DL (ref 74–99)
GLUCOSE BLD MANUAL STRIP-MCNC: 128 MG/DL (ref 74–99)
GLUCOSE BLD MANUAL STRIP-MCNC: 299 MG/DL (ref 74–99)
GLUCOSE BLD MANUAL STRIP-MCNC: 299 MG/DL (ref 74–99)
GLUCOSE SERPL-MCNC: 115 MG/DL (ref 74–99)
GLUCOSE SERPL-MCNC: 115 MG/DL (ref 74–99)
HCT VFR BLD AUTO: 37.7 % (ref 41–52)
HCT VFR BLD AUTO: 37.7 % (ref 41–52)
HGB BLD-MCNC: 12 G/DL (ref 13.5–17.5)
HGB BLD-MCNC: 12 G/DL (ref 13.5–17.5)
IMM GRANULOCYTES # BLD AUTO: 0.02 X10*3/UL (ref 0–0.5)
IMM GRANULOCYTES # BLD AUTO: 0.02 X10*3/UL (ref 0–0.5)
IMM GRANULOCYTES NFR BLD AUTO: 0.5 % (ref 0–0.9)
IMM GRANULOCYTES NFR BLD AUTO: 0.5 % (ref 0–0.9)
LYMPHOCYTES # BLD AUTO: 0.8 X10*3/UL (ref 0.8–3)
LYMPHOCYTES # BLD AUTO: 0.8 X10*3/UL (ref 0.8–3)
LYMPHOCYTES NFR BLD AUTO: 20.3 %
LYMPHOCYTES NFR BLD AUTO: 20.3 %
MAGNESIUM SERPL-MCNC: 2.01 MG/DL (ref 1.6–2.4)
MAGNESIUM SERPL-MCNC: 2.01 MG/DL (ref 1.6–2.4)
MCH RBC QN AUTO: 28.1 PG (ref 26–34)
MCH RBC QN AUTO: 28.1 PG (ref 26–34)
MCHC RBC AUTO-ENTMCNC: 31.8 G/DL (ref 32–36)
MCHC RBC AUTO-ENTMCNC: 31.8 G/DL (ref 32–36)
MCV RBC AUTO: 88 FL (ref 80–100)
MCV RBC AUTO: 88 FL (ref 80–100)
MONOCYTES # BLD AUTO: 0.52 X10*3/UL (ref 0.05–0.8)
MONOCYTES # BLD AUTO: 0.52 X10*3/UL (ref 0.05–0.8)
MONOCYTES NFR BLD AUTO: 13.2 %
MONOCYTES NFR BLD AUTO: 13.2 %
NEUTROPHILS # BLD AUTO: 2.49 X10*3/UL (ref 1.6–5.5)
NEUTROPHILS # BLD AUTO: 2.49 X10*3/UL (ref 1.6–5.5)
NEUTROPHILS NFR BLD AUTO: 63.2 %
NEUTROPHILS NFR BLD AUTO: 63.2 %
NRBC BLD-RTO: 0 /100 WBCS (ref 0–0)
NRBC BLD-RTO: 0 /100 WBCS (ref 0–0)
PHOSPHATE SERPL-MCNC: 2.5 MG/DL (ref 2.5–4.9)
PHOSPHATE SERPL-MCNC: 2.5 MG/DL (ref 2.5–4.9)
PLATELET # BLD AUTO: 130 X10*3/UL (ref 150–450)
PLATELET # BLD AUTO: 130 X10*3/UL (ref 150–450)
POTASSIUM SERPL-SCNC: 3.6 MMOL/L (ref 3.5–5.3)
POTASSIUM SERPL-SCNC: 3.6 MMOL/L (ref 3.5–5.3)
RBC # BLD AUTO: 4.27 X10*6/UL (ref 4.5–5.9)
RBC # BLD AUTO: 4.27 X10*6/UL (ref 4.5–5.9)
SODIUM SERPL-SCNC: 134 MMOL/L (ref 136–145)
SODIUM SERPL-SCNC: 134 MMOL/L (ref 136–145)
WBC # BLD AUTO: 3.9 X10*3/UL (ref 4.4–11.3)
WBC # BLD AUTO: 3.9 X10*3/UL (ref 4.4–11.3)

## 2024-09-28 PROCEDURE — 2500000001 HC RX 250 WO HCPCS SELF ADMINISTERED DRUGS (ALT 637 FOR MEDICARE OP): Performed by: STUDENT IN AN ORGANIZED HEALTH CARE EDUCATION/TRAINING PROGRAM

## 2024-09-28 PROCEDURE — 2500000004 HC RX 250 GENERAL PHARMACY W/ HCPCS (ALT 636 FOR OP/ED)

## 2024-09-28 PROCEDURE — 2500000001 HC RX 250 WO HCPCS SELF ADMINISTERED DRUGS (ALT 637 FOR MEDICARE OP)

## 2024-09-28 PROCEDURE — 99232 SBSQ HOSP IP/OBS MODERATE 35: CPT

## 2024-09-28 PROCEDURE — 2500000004 HC RX 250 GENERAL PHARMACY W/ HCPCS (ALT 636 FOR OP/ED): Performed by: STUDENT IN AN ORGANIZED HEALTH CARE EDUCATION/TRAINING PROGRAM

## 2024-09-28 PROCEDURE — 82947 ASSAY GLUCOSE BLOOD QUANT: CPT

## 2024-09-28 PROCEDURE — 83735 ASSAY OF MAGNESIUM: CPT

## 2024-09-28 PROCEDURE — 85025 COMPLETE CBC W/AUTO DIFF WBC: CPT

## 2024-09-28 PROCEDURE — 80069 RENAL FUNCTION PANEL: CPT

## 2024-09-28 PROCEDURE — 36415 COLL VENOUS BLD VENIPUNCTURE: CPT

## 2024-09-28 PROCEDURE — 99233 SBSQ HOSP IP/OBS HIGH 50: CPT | Performed by: STUDENT IN AN ORGANIZED HEALTH CARE EDUCATION/TRAINING PROGRAM

## 2024-09-28 PROCEDURE — 2500000002 HC RX 250 W HCPCS SELF ADMINISTERED DRUGS (ALT 637 FOR MEDICARE OP, ALT 636 FOR OP/ED)

## 2024-09-28 PROCEDURE — 1200000002 HC GENERAL ROOM WITH TELEMETRY DAILY

## 2024-09-28 PROCEDURE — 87205 SMEAR GRAM STAIN: CPT | Performed by: NURSE PRACTITIONER

## 2024-09-28 PROCEDURE — 87070 CULTURE OTHR SPECIMN AEROBIC: CPT | Performed by: NURSE PRACTITIONER

## 2024-09-28 RX ORDER — LOSARTAN POTASSIUM 25 MG/1
25 TABLET ORAL DAILY
Status: DISCONTINUED | OUTPATIENT
Start: 2024-09-28 | End: 2024-10-05 | Stop reason: HOSPADM

## 2024-09-28 ASSESSMENT — COGNITIVE AND FUNCTIONAL STATUS - GENERAL
DRESSING REGULAR UPPER BODY CLOTHING: A LOT
CLIMB 3 TO 5 STEPS WITH RAILING: TOTAL
TOILETING: A LITTLE
STANDING UP FROM CHAIR USING ARMS: A LITTLE
DAILY ACTIVITIY SCORE: 19
MOBILITY SCORE: 17
DRESSING REGULAR LOWER BODY CLOTHING: A LITTLE
HELP NEEDED FOR BATHING: A LITTLE
WALKING IN HOSPITAL ROOM: A LOT
MOVING TO AND FROM BED TO CHAIR: A LITTLE

## 2024-09-28 ASSESSMENT — PAIN DESCRIPTION - DESCRIPTORS: DESCRIPTORS: SORE;THROBBING

## 2024-09-28 ASSESSMENT — PAIN DESCRIPTION - ORIENTATION
ORIENTATION: POSTERIOR

## 2024-09-28 ASSESSMENT — PAIN SCALES - GENERAL
PAINLEVEL_OUTOF10: 7
PAINLEVEL_OUTOF10: 5 - MODERATE PAIN
PAINLEVEL_OUTOF10: 6
PAINLEVEL_OUTOF10: 6
PAINLEVEL_OUTOF10: 8

## 2024-09-28 ASSESSMENT — PAIN DESCRIPTION - LOCATION
LOCATION: HEAD

## 2024-09-28 ASSESSMENT — PAIN - FUNCTIONAL ASSESSMENT
PAIN_FUNCTIONAL_ASSESSMENT: 0-10
PAIN_FUNCTIONAL_ASSESSMENT: UNABLE TO SELF-REPORT

## 2024-09-28 NOTE — CONSULTS
ENT DEPARTMENT CONSULTATION NOTE  Name: Seven Robin  MRN: [id number]  : 1950  Consulting Team: Carey  Reason for Consult: Scalp wound, facial woud    History of Present Illness  The patient is a 74 y.o. male who presented to [facility] on 2024 for mechanical fall with head strike and deformity of the skull after falling from table and remaining on the ground for about 12 hours.  Patient did not lose consciousness and can recall the fall.  Extensive imaging in the ED with no acute findings.  He has remained alert and oriented x 3 with no focal deficits for the duration of his hospitalization.  Had lactic acidosis and rhabdo, improving s/p fluids.  Wound care was consulted and recommended daily saline irrigation, xeroform dressing with Mepilex border dressing.      ENT was consulted for scalp wound, facial wounds    On exam and interview, patient endorsed mild facial pain at the place of his injury (left lower lip and chin) with some pain to palpation.  He endorses headache.  Denies fever, chills, chest pain, shortness of breath.      Review of Systems  14 point review of systems completed and all negative except as noted in HPI.    Past Medical History  No past medical history on file.  HTN, HLD, DM, cirrhosis 2/2 treated hep C (), BPH, schizoaffective disorder, prostate cancer s/p EBRT    Past Surgical History  No past surgical history on file.    Allergies  No Known Allergies    Medications    Current Facility-Administered Medications:     acetaminophen (Tylenol) tablet 650 mg, 650 mg, oral, q8h PRN, Guerline Hoffman MD, 650 mg at 24 2211    [Held by provider] aspirin EC tablet 81 mg, 81 mg, oral, Daily, Guerline Hoffman MD, 81 mg at 24 0850    dextrose 50 % injection 12.5 g, 12.5 g, intravenous, q15 min PRN, Guerline Hoffman MD    dextrose 50 % injection 25 g, 25 g, intravenous, q15 min PRN, Guerline Hoffman MD    [Held by provider] enoxaparin (Lovenox) syringe 40 mg, 40 mg, subcutaneous,  q24h, Guerline Hoffman MD, 40 mg at 09/26/24 2208    glucagon (Glucagen) injection 1 mg, 1 mg, intramuscular, q15 min PRN, Guerline Hoffman MD    glucagon (Glucagen) injection 1 mg, 1 mg, intramuscular, q15 min PRN, Guerline Hoffman MD    insulin lispro (HumaLOG) injection 0-5 Units, 0-5 Units, subcutaneous, TID, Guerline Hoffman MD, 1 Units at 09/27/24 1356    iohexol (OMNIPaque) 350 mg iodine/mL solution 150 mL, 150 mL, intravenous, Once in imaging, Mina Covington MD    melatonin tablet 10 mg, 10 mg, oral, Nightly, Shyann Potter DO, 10 mg at 09/27/24 2057    oxyCODONE (Roxicodone) immediate release tablet 5 mg, 5 mg, oral, q4h PRN, Guerline Hoffman MD, 5 mg at 09/27/24 2057    polyethylene glycol (Glycolax, Miralax) packet 17 g, 17 g, oral, Daily, Guerline Hoffman MD, 17 g at 09/27/24 0852    risperiDONE (RisperDAL) tablet 2.5 mg, 2.5 mg, oral, Daily, Guerline Hoffman MD, 2.5 mg at 09/27/24 0850    sodium chloride 0.9% infusion, 200 mL/hr, intravenous, Continuous, Randy Patino MD, Last Rate: 200 mL/hr at 09/27/24 2057, 200 mL/hr at 09/27/24 2057    tamsulosin (Flomax) 24 hr capsule 0.4 mg, 0.4 mg, oral, Daily, Guerline Hoffman MD, 0.4 mg at 09/27/24 0850    traZODone (Desyrel) tablet 100 mg, 100 mg, oral, Nightly, Guerline Hoffman MD, 100 mg at 09/27/24 2057    Family History  No family history on file.    Social History  Social History     Socioeconomic History    Marital status: Single     Spouse name: Not on file    Number of children: Not on file    Years of education: Not on file    Highest education level: Not on file   Occupational History    Not on file   Tobacco Use    Smoking status: Not on file    Smokeless tobacco: Not on file   Substance and Sexual Activity    Alcohol use: Not on file    Drug use: Not on file    Sexual activity: Not on file   Other Topics Concern    Not on file   Social History Narrative    Not on file     Social Determinants of Health     Financial Resource Strain: Patient Unable To Answer  (9/27/2024)    Overall Financial Resource Strain (CARDIA)     Difficulty of Paying Living Expenses: Patient unable to answer   Food Insecurity: Patient Unable To Answer (9/27/2024)    Hunger Vital Sign     Worried About Running Out of Food in the Last Year: Patient unable to answer     Ran Out of Food in the Last Year: Patient unable to answer   Transportation Needs: Patient Unable To Answer (9/27/2024)    PRAPARE - Transportation     Lack of Transportation (Medical): Patient unable to answer     Lack of Transportation (Non-Medical): Patient unable to answer   Physical Activity: Not on file   Stress: Not on file   Social Connections: Not on file   Intimate Partner Violence: Patient Unable To Answer (9/27/2024)    Humiliation, Afraid, Rape, and Kick questionnaire     Fear of Current or Ex-Partner: Patient unable to answer     Emotionally Abused: Patient unable to answer     Physically Abused: Patient unable to answer     Sexually Abused: Patient unable to answer   Housing Stability: Patient Unable To Answer (9/27/2024)    Housing Stability Vital Sign     Unable to Pay for Housing in the Last Year: Patient unable to answer     Number of Times Moved in the Last Year: 0     Homeless in the Last Year: Patient unable to answer       Vital Signs  Vitals:    09/27/24 2032   BP:    Pulse: 97   Resp:    Temp: 36.9 °C (98.4 °F)   SpO2: 98%       Physical Examination  GEN: The patient appears stated age in no acute distress  VOICE: No dysphonia, volume is appropriate, no pitch/voice breaks   RESP: Unlabored on room air with no audible stertor or stridor  CV: Clinically well perfused   EYES: EOM grossly intact with no scleral icterus  NEURO: Alert and oriented with no focal deficits and CN II-XII symmetric and intact bilaterally  HEAD: Scalp has large pressure wound on superior posterior aspect.  15X 7 cm.  Black eschar surrounded by red, moist periwound.  Underlying the wound is a +hematoma  FACE: Small lacerations on left  lower lip and left side of chin.  Appear to be healing.  Scabbing on chin wounds.  No maxillary or mandibular stepoffs  EARS: Normal external ears, normal hearing to spoken voice  NOSE: External nose appears normal, no significant septal deviation  OC: Mild lower lip edema. normal buccal mucosa, normal alveolar ridge, normal floor of mouth, normal tongue, normal hard palate, normal retromolar trigone.  Several fillings in teeth.  OP: normal pharyngeal walls, normal soft palate  NECK: Trachea midline, no significant lymphadenopathy    Laboratory and Data  Results for orders placed or performed during the hospital encounter of 09/24/24 (from the past 24 hour(s))   POCT GLUCOSE   Result Value Ref Range    POCT Glucose 177 (H) 74 - 99 mg/dL   POCT GLUCOSE   Result Value Ref Range    POCT Glucose 124 (H) 74 - 99 mg/dL   DRUG SCREEN,URINE   Result Value Ref Range    Amphetamine Screen, Urine Presumptive Negative Presumptive Negative    Barbiturate Screen, Urine Presumptive Negative Presumptive Negative    Benzodiazepines Screen, Urine Presumptive Negative Presumptive Negative    Cannabinoid Screen, Urine Presumptive Negative Presumptive Negative    Cocaine Metabolite Screen, Urine Presumptive Negative Presumptive Negative    Fentanyl Screen, Urine Presumptive Negative Presumptive Negative    Opiate Screen, Urine Presumptive Negative Presumptive Negative    Oxycodone Screen, Urine Presumptive Positive (A) Presumptive Negative    PCP Screen, Urine Presumptive Negative Presumptive Negative    Methadone Screen, Urine Presumptive Negative Presumptive Negative   POCT GLUCOSE   Result Value Ref Range    POCT Glucose 147 (H) 74 - 99 mg/dL   CBC and Auto Differential   Result Value Ref Range    WBC 3.5 (L) 4.4 - 11.3 x10*3/uL    nRBC 0.0 0.0 - 0.0 /100 WBCs    RBC 4.84 4.50 - 5.90 x10*6/uL    Hemoglobin 13.6 13.5 - 17.5 g/dL    Hematocrit 41.7 41.0 - 52.0 %    MCV 86 80 - 100 fL    MCH 28.1 26.0 - 34.0 pg    MCHC 32.6 32.0 - 36.0  g/dL    RDW 13.6 11.5 - 14.5 %    Platelets 158 150 - 450 x10*3/uL    Neutrophils % 66.8 40.0 - 80.0 %    Immature Granulocytes %, Automated 0.3 0.0 - 0.9 %    Lymphocytes % 19.8 13.0 - 44.0 %    Monocytes % 10.5 2.0 - 10.0 %    Eosinophils % 2.0 0.0 - 6.0 %    Basophils % 0.6 0.0 - 2.0 %    Neutrophils Absolute 2.36 1.60 - 5.50 x10*3/uL    Immature Granulocytes Absolute, Automated 0.01 0.00 - 0.50 x10*3/uL    Lymphocytes Absolute 0.70 (L) 0.80 - 3.00 x10*3/uL    Monocytes Absolute 0.37 0.05 - 0.80 x10*3/uL    Eosinophils Absolute 0.07 0.00 - 0.40 x10*3/uL    Basophils Absolute 0.02 0.00 - 0.10 x10*3/uL   Renal Function Panel   Result Value Ref Range    Glucose 126 (H) 74 - 99 mg/dL    Sodium 137 136 - 145 mmol/L    Potassium 3.7 3.5 - 5.3 mmol/L    Chloride 101 98 - 107 mmol/L    Bicarbonate 27 21 - 32 mmol/L    Anion Gap 13 10 - 20 mmol/L    Urea Nitrogen 8 6 - 23 mg/dL    Creatinine 0.79 0.50 - 1.30 mg/dL    eGFR >90 >60 mL/min/1.73m*2    Calcium 8.9 8.6 - 10.6 mg/dL    Phosphorus 2.0 (L) 2.5 - 4.9 mg/dL    Albumin 3.7 3.4 - 5.0 g/dL   Magnesium   Result Value Ref Range    Magnesium 2.11 1.60 - 2.40 mg/dL   Creatine Kinase   Result Value Ref Range    Creatine Kinase 2,598 (H) 0 - 325 U/L   POCT GLUCOSE   Result Value Ref Range    POCT Glucose 175 (H) 74 - 99 mg/dL   POCT GLUCOSE   Result Value Ref Range    POCT Glucose 127 (H) 74 - 99 mg/dL       Radiology Reviewed    MR brain wo IV contrast    Result Date: 9/27/2024  1. No diffusion restriction abnormality is present to suggest an acute infarct. 2. A FLAIR hyperintense, T1 isointense subdural hematoma is layering between the right occipital lobe in the tentorium, measuring between 2-3 mm in thickness, with minimal mass effect on the adjacent brain parenchyma, new/increased in size from prior CT head dated 09/24/2024. 3. A subtle T2 hyperintense collection overlies the right frontal lobe, measuring up to 3 mm in maximal thickness, with minimal if any mass effect  on the adjacent brain parenchyma, and a 2-3 mm leftward shift of midline structures. Collection demonstrates no associated FLAIR hyperintensity or blooming on gradient echo and may represent a more chronic hygroma. 4. Moderate diffuse brain parenchymal volume loss with patchy and confluence areas of hyperintense FLAIR and T2 signal are present in the periventricular and subcortical white matter of bilateral cerebral hemispheres, nonspecific findings favored to represent sequela of microvascular disease. 5. Scalp swelling and edema slightly improved from prior CT dated 09/24/2024.     CT head W O contrast trauma protocol    Result Date: 9/24/2024  No acute intracranial abnormality or calvarial fracture was identified with significant scalp edema on a posttraumatic basis. No acute fracture or posttraumatic subluxation of the vertebral column was identified with findings as above.      Visualized paranasal sinuses and mastoid air cells are  clear.          Assessment  Seven Robin is a 74 y.o. male who is admitted for mechanical fall with resultant scalp pressure wound and minor facial lacerations.  He is consulted for scalp wound and facial wounds. Wound care is already on board for scalp wound.      Recommendations  -Agree with wound care recs for xeroform  - We have been in communication with plastic surgery to work with us as well and follow up in wound clinic  - ENT will sign off at this time    Patient seen and staffed with attending Dr. Barbosa.    Sherwin Arauz MD - PGY1  Otolaryngology - Head & Neck Surgery  [facility]    ENT Consult pager: [phone]  ENT Peds pager: [phone]  ENT Head & Neck Surgery Phone: [phone]  ENT subspecialty team: Marlin individual resident who wrote today's note  ENT Outpatient scheduling number: [phone]  Please Page [phone] or call [phone] if Urgent    ATTENDING NOTE:  I saw and evaluated the patient. I personally obtained the key and critical portions of the history and  physical exam or was physically present for key and critical portions performed by the resident/fellow. I reviewed the resident/fellow's documentation and discussed the patient with the resident/fellow. I agree with the resident/fellow's medical decision making as documented in the note.  - Reviewed the patient's scalp which includes a large scalp wound - there is a central area with dark skin surrounded by a rim of skin which has sloughed.  The central area is dry and may need to be debrided versus allowing this to scab and then come off over time.    - Underlying scalp hematoma  - Agree with wound care   - Have been in communication with plastic surgery and appreciate collaboration between both of our services on the care of this patient.  They may be performing some skin debridement and will be assessing the patient as well.    Anastasiya Barbosa MD

## 2024-09-28 NOTE — PROGRESS NOTES
"  Department of Plastic and Reconstructive Surgery  Daily Progress Note    Patient Name: Seven Robin  MRN: [id number]  Date:  09/28/24     Subjective  Patient found resting in bed this morning, offloading pressure from occipital wound. States his pain is improved s/p yesterday's debridement. Denies any fever, chills, night sweats, CP, SOB, palpitations, nausea, vomiting, or abdominal pain/discomfort.      Objective    Vital Signs  /81   Pulse 61   Temp 36.5 °C (97.7 °F)   Resp 16   Ht 1.803 m (5' 11\")   Wt 94.3 kg (208 lb)   SpO2 98%   BMI 29.01 kg/m²      Physical Exam   Constitutional: A&Ox3, calm and cooperative, NAD.  Eyes: PERRL, EOMI  ENMT: Moist mucous membranes, no apparent injuries or lesions. Small lacerations on left lower lip and left side of chin, appear to be healing, scabbing on chin wounds, mild lower lip edema.  Head/Neck: NC/AT. Scalp has large wound on superior posterior aspect. 15X 7 cm. Intermittent black eschar on wound bed, improved post-debridement. Scant bloody drainage present. Dressed with xeroform.  Cardiovascular: Normal rate and regular rhythm. 2+ equal pulses of the distal extremities.  Respiratory/Thorax: CTAB, regular respirations on RA. Good symmetric chest expansion.   Gastrointestinal: Abdomen soft, NTND, +BS x4, +BM  Genitourinary: voiding independently via urinal, clear  yellow output  Extremities: No peripheral edema. Generalized weakness, 1+ edema to BLE and BUE, abrasions to left elbow and shoulder, knees  Neurological: A&Ox3.   Psychological: Appropriate mood and behavior.    Diagnostics   Results for orders placed or performed during the hospital encounter of 09/24/24 (from the past 24 hour(s))   POCT GLUCOSE   Result Value Ref Range    POCT Glucose 127 (H) 74 - 99 mg/dL   Tissue/Wound Culture/Smear    Specimen: Wound/Tissue; Tissue/Biopsy   Result Value Ref Range    Tissue/Wound Culture/Smear Culture in progress     Gram Stain No polymorphonuclear leukocytes " seen (A)     Gram Stain (1+) Rare Gram positive cocci, clusters (A)    CBC and Auto Differential   Result Value Ref Range    WBC 3.9 (L) 4.4 - 11.3 x10*3/uL    nRBC 0.0 0.0 - 0.0 /100 WBCs    RBC 4.27 (L) 4.50 - 5.90 x10*6/uL    Hemoglobin 12.0 (L) 13.5 - 17.5 g/dL    Hematocrit 37.7 (L) 41.0 - 52.0 %    MCV 88 80 - 100 fL    MCH 28.1 26.0 - 34.0 pg    MCHC 31.8 (L) 32.0 - 36.0 g/dL    RDW 13.8 11.5 - 14.5 %    Platelets 130 (L) 150 - 450 x10*3/uL    Neutrophils % 63.2 40.0 - 80.0 %    Immature Granulocytes %, Automated 0.5 0.0 - 0.9 %    Lymphocytes % 20.3 13.0 - 44.0 %    Monocytes % 13.2 2.0 - 10.0 %    Eosinophils % 2.3 0.0 - 6.0 %    Basophils % 0.5 0.0 - 2.0 %    Neutrophils Absolute 2.49 1.60 - 5.50 x10*3/uL    Immature Granulocytes Absolute, Automated 0.02 0.00 - 0.50 x10*3/uL    Lymphocytes Absolute 0.80 0.80 - 3.00 x10*3/uL    Monocytes Absolute 0.52 0.05 - 0.80 x10*3/uL    Eosinophils Absolute 0.09 0.00 - 0.40 x10*3/uL    Basophils Absolute 0.02 0.00 - 0.10 x10*3/uL   Renal Function Panel   Result Value Ref Range    Glucose 115 (H) 74 - 99 mg/dL    Sodium 134 (L) 136 - 145 mmol/L    Potassium 3.6 3.5 - 5.3 mmol/L    Chloride 102 98 - 107 mmol/L    Bicarbonate 24 21 - 32 mmol/L    Anion Gap 12 10 - 20 mmol/L    Urea Nitrogen 6 6 - 23 mg/dL    Creatinine 0.77 0.50 - 1.30 mg/dL    eGFR >90 >60 mL/min/1.73m*2    Calcium 8.4 (L) 8.6 - 10.6 mg/dL    Phosphorus 2.5 2.5 - 4.9 mg/dL    Albumin 3.4 3.4 - 5.0 g/dL   Magnesium   Result Value Ref Range    Magnesium 2.01 1.60 - 2.40 mg/dL   POCT GLUCOSE   Result Value Ref Range    POCT Glucose 128 (H) 74 - 99 mg/dL     MR brain wo IV contrast    Result Date: 9/27/2024  Interpreted By:  Giovanni Valerio,  and Karolyn Conner STUDY: MR BRAIN WO IV CONTRAST;  9/26/2024 8:00 pm   INDICATION: Signs/Symptoms:left facial drop after fall with significant head injury, >24 hrs. pt reports longstanding v. since intial injury.   COMPARISON: CT head 09/24/2024   ACCESSION  NUMBER(S): [id number]   ORDERING CLINICIAN: YOU FRANCOIS   TECHNIQUE: Axial T2, FLAIR, DWI, gradient echo T2 and sagittal and coronal T1 weighted images of the brain were acquired.   FINDINGS: There is no diffusion restriction to suggest acute infarction.   There is subtle FLAIR hyperintense, T1 isointense subdural collection present along the medial aspect of the right occipital lobe (series 3, image 20) with blooming artifact present in the dark gradient echo images (series 6, image 20) with asymmetric T1 isointense thickening of the right tentorium, suggestive of layering acute subdural hematoma between the right occipital lobe and the tentorium (series 7, image 34)   There is also a T2 hyperintense, T1 isointense extra-axial collection overlying the right frontal region measuring up to 3 mm in maximal thickness, essentially unchanged in appearance to prior CT head. There may be slight mass effect on the adjacent brain parenchyma, with a 2-3 mm rightward shift of midline structures at the septum pellucidum.   The ventricles and sulci prominent likely due to diffuse parenchymal volume loss. No new ventricular dilatation is identified. Basal cisterns are patent.   There are extensive periventricular and subcortical T2/FLAIR hyperintensities likely represent sequela of small-vessel ischemic changes.   Fluid and edema are again present in the scalp soft tissues bilaterally, perhaps slightly improved compared to prior exam on 09/24/2024.   Visualized large arterial flow voids, including intracranial carotid and basilar artery are preserved.   No abnormal fluid signal is present in the paranasal sinuses or mastoid air cells.       1. No diffusion restriction abnormality is present to suggest an acute infarct. 2. A FLAIR hyperintense, T1 isointense subdural hematoma is layering between the right occipital lobe in the tentorium, measuring between 2-3 mm in thickness, with minimal mass effect on the adjacent brain  parenchyma, new/increased in size from prior CT head dated 09/24/2024. 3. A subtle T2 hyperintense collection overlies the right frontal lobe, measuring up to 3 mm in maximal thickness, with minimal if any mass effect on the adjacent brain parenchyma, and a 2-3 mm leftward shift of midline structures. Collection demonstrates no associated FLAIR hyperintensity or blooming on gradient echo and may represent a more chronic hygroma. 4. Moderate diffuse brain parenchymal volume loss with patchy and confluence areas of hyperintense FLAIR and T2 signal are present in the periventricular and subcortical white matter of bilateral cerebral hemispheres, nonspecific findings favored to represent sequela of microvascular disease. 5. Scalp swelling and edema slightly improved from prior CT dated 09/24/2024.   I personally reviewed the images/study and I agree with the findings as stated by resident physician Dr. Ubaldo Cortes . This study was interpreted at [facility], [place], [place].   MACRO: None   Signed by: Giovanni Valerio 9/27/2024 1:34 AM Dictation workstation:   [id number]      Current Medications  Scheduled medications  [Held by provider] aspirin, 81 mg, oral, Daily  [Held by provider] enoxaparin, 40 mg, subcutaneous, q24h  insulin lispro, 0-5 Units, subcutaneous, TID  iohexol, 150 mL, intravenous, Once in imaging  melatonin, 10 mg, oral, Nightly  polyethylene glycol, 17 g, oral, Daily  risperiDONE, 2.5 mg, oral, Daily  tamsulosin, 0.4 mg, oral, Daily  traZODone, 100 mg, oral, Nightly      Continuous medications  sodium chloride 0.9%, 200 mL/hr, Last Rate: 200 mL/hr (09/28/24 0230)      PRN medications  PRN medications: acetaminophen, dextrose, dextrose, glucagon, glucagon, oxyCODONE     Assessment  Seven Robin is a 74 y.o. male who presented to [facility] on 9/24/2024 as a full trauma activation for mechanical fall with head strike and deformity of the skull after falling  from table and remaining on the ground for approximately 12 hours. Reports he was reaching for something and was standing on a table when he fell backwards. His roommate found him in the morning. Reports no loss of consciousness and can recall the fall.  Extensive imaging in the ED with no acute findings aside from scalp wound.  He has remained alert and oriented x 3 with no focal deficits for the duration of his hospitalization.  Had lactic acidosis and rhabdo, improving s/p fluids. Occipital wound with necrosis, wound care was consulted and recommended daily saline irrigation and xeroform dressing with Mepilex border dressing. Plastic Surgery was consulted for management of occipital wound.     Plan/Recommendations  S/p Occipital trauma with hematoma/Unstageable pressure ulcer to occipital area  - No emergent surgical intervention from plastic surgery perspective at this time  - Dressing removed on exam today and xeroform placed over wound  - Bedside debridement performed 9/27, see procedure note for additional details  - Appreciate wound care recs, currently dressing with xeroform  - Follow wound culture       · 1+ gram positive cocci (preliminary)  - Offload pressure of occipital wound  - Recommend nutrition optimization for wound healing, albumin and prealbumin monitoring, nutrition supplements Q meal  - Recommend tight glycemic control  - Plastics will continue to follow and monitor wound progress  - Remainder of care per primary team    Patient and plan discussed with Dr. Danielle.     Pam Mason PA-C   Plastic and Reconstructive Surgery   [place]  Pager #[phone]  Team phone: [phone]

## 2024-09-28 NOTE — PROGRESS NOTES
Attempted to meet with pt this morning at bedside but he was asleep in bed, untouched breakfast tray sitting in front of him, he barely responded to his name being called. Assessment deferred to emergency contact.  Called emergency contact, brother Danny Falcon [phone] but he stated he is on his way to a  and asked that I call him back another time. Plan to attempt to meet with pt or contact brother another time. Care coordinator will continue to follow for discharge planning needs.    Brad Mnedez RN  Transitional Care Coordinator (TCC)  [phone] or [phone]

## 2024-09-28 NOTE — PROCEDURES
74 y.o. 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 (See formal consult note for details). Plastic Surgery consulted for management of occipital unstageable pressure injury.     Patient consented to the excisional debridement procedure to the occipital area of head. He was reviewed the procedure and explained the risks and benefits of debridement and wishes to proceed.     He was positioned supine on the bed, wound was prepped and draped in sterile fashion, wound cleaned with betadine. Wound edges and wound was injected with Lidocaine 1% with Epinephrine local anesthetic    With scalpel I excised nonviable eschar and subcutaneous tissues from the occipital wound down to healthy bleeding tissue. Hemostasis achieved with pressure and local anesthetic. There is total of 105 cm² debrided, wound measured 15 x 7 cm. Patient tolerated the procedure well until he didn't any longer and debridement was stopped. Dry dressing applied. Plastics to follow with further recommendations for head wound after reassessment tomorrow. See pictures below for before and after.

## 2024-09-28 NOTE — CONSULTS
Reason For Consult  Occipital scalp trauma with hematoma    History Of Present Illness  Seven Robin is a 74 y.o. male who presented to [facility] on 9/24/2024 as a full trauma activation for mechanical fall with head strike and deformity of the skull after falling from table and remaining on the ground for about 12 hours. Reports he was reaching for something and was standing on a table when he fell backwards, he kept trying to get up but was too weak and was down for 12 hours, lives with roommate who didn't come home until the morning. Reports no  loss of consciousness and can recall the fall.  Extensive imaging in the ED with no acute findings.  He has remained alert and oriented x 3 with no focal deficits for the duration of his hospitalization.  Had lactic acidosis and rhabdo, improving s/p fluids. Had significant occipital wound with necrosis and wound care was consulted and recommended daily saline irrigation, xeroform dressing with Mepilex border dressing. ENT consulted for occipital wound and facial swelling. Denies any fever, chills, night sweats, CP, SOB, palpitations, nausea, vomiting, diarrhea, constipation, dysuria, hematuria, hematochezia, hematemesis, flank pain. C/o 8/10 generalized to body and head, worse with movement, better with rest, feels that he is getting stronger but still feels weak. Wound care consulted for head wound, unstageable pressure injury, covered with xeroform and mepilex. Plastic Surgery was consulted for management of occipital wound.      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     Social History  He has no history on file for tobacco use, alcohol use, and drug use.    Family History  No family history on file.     Allergies  Patient has no known allergies.    Review of Systems  ROS: All 10 systems were reviewed and are  "unremarkable except for those mentioned in HPI.      Physical Exam  Constitutional: A&Ox3, calm and cooperative, NAD.  Eyes: PERRL, EOMI  ENMT: Moist mucous membranes, no apparent injuries or lesions. Small lacerations on left lower lip and left side of chin, appear to be healing, scabbing on chin wounds, mild lower lip edema.  Head/Neck: NC/AT. Scalp has large pressure wound on superior posterior aspect. 15X 7 cm. Black eschar surrounded by red, moist periwound. Scant clear serous drainage present, purulent drainage at border. (See below picture)   Cardiovascular: Normal rate and regular rhythm. 2+ equal pulses of the distal extremities.  Respiratory/Thorax: CTAB, regular respirations on RA. Good symmetric chest expansion.   Gastrointestinal: Abdomen soft, NTND, +BS x4, +BM  Genitourinary: voiding independently via urinal, clear  yellow output  Extremities: No peripheral edema. Generalized weakness, 1+ edema to BLE and BUE, abrasions to left elbow and shoulder, knees  Neurological: A&Ox3.   Psychological: Appropriate mood and behavior.                   Last Recorded Vitals  Blood pressure (!) 164/99, pulse 97, temperature 36.9 °C (98.4 °F), resp. rate 16, height 1.803 m (5' 11\"), weight 94.3 kg (208 lb), SpO2 98%.    Relevant Results  MR brain wo IV contrast    Result Date: 9/27/2024  Interpreted By:  Giovanni Valerio,  and Karolyn Conner STUDY: MR BRAIN WO IV CONTRAST;  9/26/2024 8:00 pm   INDICATION: Signs/Symptoms:left facial drop after fall with significant head injury, >24 hrs. pt reports longstanding v. since intial injury.   COMPARISON: CT head 09/24/2024   ACCESSION NUMBER(S): [id number]   ORDERING CLINICIAN: YOU FRANCOIS   TECHNIQUE: Axial T2, FLAIR, DWI, gradient echo T2 and sagittal and coronal T1 weighted images of the brain were acquired.   FINDINGS: There is no diffusion restriction to suggest acute infarction.   There is subtle FLAIR hyperintense, T1 isointense subdural collection " present along the medial aspect of the right occipital lobe (series 3, image 20) with blooming artifact present in the dark gradient echo images (series 6, image 20) with asymmetric T1 isointense thickening of the right tentorium, suggestive of layering acute subdural hematoma between the right occipital lobe and the tentorium (series 7, image 34)   There is also a T2 hyperintense, T1 isointense extra-axial collection overlying the right frontal region measuring up to 3 mm in maximal thickness, essentially unchanged in appearance to prior CT head. There may be slight mass effect on the adjacent brain parenchyma, with a 2-3 mm rightward shift of midline structures at the septum pellucidum.   The ventricles and sulci prominent likely due to diffuse parenchymal volume loss. No new ventricular dilatation is identified. Basal cisterns are patent.   There are extensive periventricular and subcortical T2/FLAIR hyperintensities likely represent sequela of small-vessel ischemic changes.   Fluid and edema are again present in the scalp soft tissues bilaterally, perhaps slightly improved compared to prior exam on 09/24/2024.   Visualized large arterial flow voids, including intracranial carotid and basilar artery are preserved.   No abnormal fluid signal is present in the paranasal sinuses or mastoid air cells.       1. No diffusion restriction abnormality is present to suggest an acute infarct. 2. A FLAIR hyperintense, T1 isointense subdural hematoma is layering between the right occipital lobe in the tentorium, measuring between 2-3 mm in thickness, with minimal mass effect on the adjacent brain parenchyma, new/increased in size from prior CT head dated 09/24/2024. 3. A subtle T2 hyperintense collection overlies the right frontal lobe, measuring up to 3 mm in maximal thickness, with minimal if any mass effect on the adjacent brain parenchyma, and a 2-3 mm leftward shift of midline structures. Collection demonstrates no  associated FLAIR hyperintensity or blooming on gradient echo and may represent a more chronic hygroma. 4. Moderate diffuse brain parenchymal volume loss with patchy and confluence areas of hyperintense FLAIR and T2 signal are present in the periventricular and subcortical white matter of bilateral cerebral hemispheres, nonspecific findings favored to represent sequela of microvascular disease. 5. Scalp swelling and edema slightly improved from prior CT dated 09/24/2024.   I personally reviewed the images/study and I agree with the findings as stated by resident physician Dr. Ubaldo Cortes . This study was interpreted at [facility], [place], [place].   MACRO: None   Signed by: Giovanni Valerio 9/27/2024 1:34 AM Dictation workstation:   [id number]    XR wrist left 3+ views    Result Date: 9/24/2024  Interpreted By:  Doc Smith, STUDY: XR WRIST LEFT 3+ VIEWS; ;  9/24/2024 2:05 pm   INDICATION: Signs/Symptoms:Wrist pain.     COMPARISON: None.   ACCESSION NUMBER(S): [id number]   ORDERING CLINICIAN: ARIANNE LADD   FINDINGS: Three views of the left wrist and three views of the left hand   No definitive fracture. Joint alignment is maintained. Soft tissue edema of the wrist.       No definitive fracture.     MACRO: None   Signed by: Doc Smith 9/24/2024 2:47 PM Dictation workstation:   [id number]    XR hand left 3+ views    Result Date: 9/24/2024  Interpreted By:  Doc Smith, STUDY: XR HAND LEFT 3+ VIEWS; ;  9/24/2024 2:05 pm   INDICATION: Signs/Symptoms:Pain.     COMPARISON: None.   ACCESSION NUMBER(S): [id number]   ORDERING CLINICIAN: ARIANNE LADD   FINDINGS: Three views of the left wrist and three views of the left hand   No definitive fracture. Joint alignment is maintained. Soft tissue edema of the wrist.       No definitive fracture.     MACRO: None   Signed by: Doc Smith 9/24/2024 2:39 PM Dictation workstation:   [id number]    CT cervical spine  wo IV contrast    Result Date: 9/24/2024  Interpreted By:  Mazin Jaimes, STUDY: CT CERVICAL SPINE WO IV CONTRAST; CT THORACIC SPINE WO IV CONTRAST; CT LUMBAR SPINE WO IV CONTRAST; CT HEAD W/O CONTRAST TRAUMA PROTOCOL; 9/24/2024 11:08 am; 9/24/2024 11:15 am   INDICATION: Signs/Symptoms:TRAUMA; Signs/Symptoms:trauma; Signs/Symptoms:CF SKULL FRACTURE AND ICH.     COMPARISON: None.   ACCESSION NUMBER(S): [id number]; [id number]; [id number]; [id number]   ORDERING CLINICIAN: GUY MARCELO   TECHNIQUE: Axial CT images of the head, cervical, thoracic, and lumbar spine are obtained. Axial, coronal and sagittal reconstructions are provided for review.   FINDINGS: Findings head CT: Moderate scalp swelling on a posttraumatic basis noted. No convincing evidence for acute intracranial hemorrhage with increased attenuation along the falx cerebri probably due to minimal calcification. No mass effect or midline shift. Minimal to moderate dilatation of the lateral and 3rd ventricles at least partly due to global parenchymal volume loss. Moderate nonspecific periventricular white matter hypodensity may be due to chronic small vessel ischemic changes given the age of the patient. No evidence of calvarial fracture was identified. Visualized paranasal sinuses and mastoid air cells are clear.   Findings cervical spine: No acute fracture or posttraumatic subluxation of the cervical vertebral bodies was identified. Partially calcified disc herniations are noted at C2-3 C3-4 C4-5 C5-6 C6-7 which along with posterior endplate osteophytes indent the ventral thecal sac. There is moderate to marked right foraminal stenosis at C3-4 and C6-7 and left-sided foraminal stenosis at C5-6. Emphysematous changes are noted in the region of the lung apex.   Findings thoracic spine: No acute fracture or posttraumatic subluxation was identified in the thoracic region. No high-grade spinal canal or foraminal stenosis was identified within the limits of  this exam.   Findings lumbar spine: No acute fracture or clear posttraumatic spondylolisthesis was identified in the lumbar region. Grade 1 anterolisthesis L3 over L4. Patient is status post laminectomy at L4 and L5. There are moderate disc bulges and/or central and paracentral herniations indenting the ventral thecal sac and moderately narrowing the neuroforamina at L3-4 L4-5 and L5-S1.       No acute intracranial abnormality or calvarial fracture was identified with significant scalp edema on a posttraumatic basis. No acute fracture or posttraumatic subluxation of the vertebral column was identified with findings as above.   MACRO: None   Signed by: Mazin Jaimes 9/24/2024 11:39 AM Dictation workstation:   [id number]    CT thoracic spine wo IV contrast    Result Date: 9/24/2024  Interpreted By:  Mazin Jaimes, STUDY: CT CERVICAL SPINE WO IV CONTRAST; CT THORACIC SPINE WO IV CONTRAST; CT LUMBAR SPINE WO IV CONTRAST; CT HEAD W/O CONTRAST TRAUMA PROTOCOL; 9/24/2024 11:08 am; 9/24/2024 11:15 am   INDICATION: Signs/Symptoms:TRAUMA; Signs/Symptoms:trauma; Signs/Symptoms:CF SKULL FRACTURE AND ICH.     COMPARISON: None.   ACCESSION NUMBER(S): [id number]; [id number]; [id number]; [id number]   ORDERING CLINICIAN: GUY MARCELO   TECHNIQUE: Axial CT images of the head, cervical, thoracic, and lumbar spine are obtained. Axial, coronal and sagittal reconstructions are provided for review.   FINDINGS: Findings head CT: Moderate scalp swelling on a posttraumatic basis noted. No convincing evidence for acute intracranial hemorrhage with increased attenuation along the falx cerebri probably due to minimal calcification. No mass effect or midline shift. Minimal to moderate dilatation of the lateral and 3rd ventricles at least partly due to global parenchymal volume loss. Moderate nonspecific periventricular white matter hypodensity may be due to chronic small vessel ischemic changes given the age of the patient. No evidence of  calvarial fracture was identified. Visualized paranasal sinuses and mastoid air cells are clear.   Findings cervical spine: No acute fracture or posttraumatic subluxation of the cervical vertebral bodies was identified. Partially calcified disc herniations are noted at C2-3 C3-4 C4-5 C5-6 C6-7 which along with posterior endplate osteophytes indent the ventral thecal sac. There is moderate to marked right foraminal stenosis at C3-4 and C6-7 and left-sided foraminal stenosis at C5-6. Emphysematous changes are noted in the region of the lung apex.   Findings thoracic spine: No acute fracture or posttraumatic subluxation was identified in the thoracic region. No high-grade spinal canal or foraminal stenosis was identified within the limits of this exam.   Findings lumbar spine: No acute fracture or clear posttraumatic spondylolisthesis was identified in the lumbar region. Grade 1 anterolisthesis L3 over L4. Patient is status post laminectomy at L4 and L5. There are moderate disc bulges and/or central and paracentral herniations indenting the ventral thecal sac and moderately narrowing the neuroforamina at L3-4 L4-5 and L5-S1.       No acute intracranial abnormality or calvarial fracture was identified with significant scalp edema on a posttraumatic basis. No acute fracture or posttraumatic subluxation of the vertebral column was identified with findings as above.   MACRO: None   Signed by: Mazin Jaimes 9/24/2024 11:39 AM Dictation workstation:   [id number]    CT lumbar spine wo IV contrast    Result Date: 9/24/2024  Interpreted By:  Mazin Jaimes, STUDY: CT CERVICAL SPINE WO IV CONTRAST; CT THORACIC SPINE WO IV CONTRAST; CT LUMBAR SPINE WO IV CONTRAST; CT HEAD W/O CONTRAST TRAUMA PROTOCOL; 9/24/2024 11:08 am; 9/24/2024 11:15 am   INDICATION: Signs/Symptoms:TRAUMA; Signs/Symptoms:trauma; Signs/Symptoms:CF SKULL FRACTURE AND ICH.     COMPARISON: None.   ACCESSION NUMBER(S): [id number]; [id number]; [id number];  [id number]   ORDERING CLINICIAN: GUY MARCELO   TECHNIQUE: Axial CT images of the head, cervical, thoracic, and lumbar spine are obtained. Axial, coronal and sagittal reconstructions are provided for review.   FINDINGS: Findings head CT: Moderate scalp swelling on a posttraumatic basis noted. No convincing evidence for acute intracranial hemorrhage with increased attenuation along the falx cerebri probably due to minimal calcification. No mass effect or midline shift. Minimal to moderate dilatation of the lateral and 3rd ventricles at least partly due to global parenchymal volume loss. Moderate nonspecific periventricular white matter hypodensity may be due to chronic small vessel ischemic changes given the age of the patient. No evidence of calvarial fracture was identified. Visualized paranasal sinuses and mastoid air cells are clear.   Findings cervical spine: No acute fracture or posttraumatic subluxation of the cervical vertebral bodies was identified. Partially calcified disc herniations are noted at C2-3 C3-4 C4-5 C5-6 C6-7 which along with posterior endplate osteophytes indent the ventral thecal sac. There is moderate to marked right foraminal stenosis at C3-4 and C6-7 and left-sided foraminal stenosis at C5-6. Emphysematous changes are noted in the region of the lung apex.   Findings thoracic spine: No acute fracture or posttraumatic subluxation was identified in the thoracic region. No high-grade spinal canal or foraminal stenosis was identified within the limits of this exam.   Findings lumbar spine: No acute fracture or clear posttraumatic spondylolisthesis was identified in the lumbar region. Grade 1 anterolisthesis L3 over L4. Patient is status post laminectomy at L4 and L5. There are moderate disc bulges and/or central and paracentral herniations indenting the ventral thecal sac and moderately narrowing the neuroforamina at L3-4 L4-5 and L5-S1.       No acute intracranial abnormality or calvarial  fracture was identified with significant scalp edema on a posttraumatic basis. No acute fracture or posttraumatic subluxation of the vertebral column was identified with findings as above.   MACRO: None   Signed by: Mazin Jaimes 9/24/2024 11:39 AM Dictation workstation:   [id number]    CT head W O contrast trauma protocol    Result Date: 9/24/2024  Interpreted By:  Mazin Jaimes, STUDY: CT CERVICAL SPINE WO IV CONTRAST; CT THORACIC SPINE WO IV CONTRAST; CT LUMBAR SPINE WO IV CONTRAST; CT HEAD W/O CONTRAST TRAUMA PROTOCOL; 9/24/2024 11:08 am; 9/24/2024 11:15 am   INDICATION: Signs/Symptoms:TRAUMA; Signs/Symptoms:trauma; Signs/Symptoms:CF SKULL FRACTURE AND ICH.     COMPARISON: None.   ACCESSION NUMBER(S): [id number]; [id number]; [id number]; [id number]   ORDERING CLINICIAN: GUY MARCELO   TECHNIQUE: Axial CT images of the head, cervical, thoracic, and lumbar spine are obtained. Axial, coronal and sagittal reconstructions are provided for review.   FINDINGS: Findings head CT: Moderate scalp swelling on a posttraumatic basis noted. No convincing evidence for acute intracranial hemorrhage with increased attenuation along the falx cerebri probably due to minimal calcification. No mass effect or midline shift. Minimal to moderate dilatation of the lateral and 3rd ventricles at least partly due to global parenchymal volume loss. Moderate nonspecific periventricular white matter hypodensity may be due to chronic small vessel ischemic changes given the age of the patient. No evidence of calvarial fracture was identified. Visualized paranasal sinuses and mastoid air cells are clear.   Findings cervical spine: No acute fracture or posttraumatic subluxation of the cervical vertebral bodies was identified. Partially calcified disc herniations are noted at C2-3 C3-4 C4-5 C5-6 C6-7 which along with posterior endplate osteophytes indent the ventral thecal sac. There is moderate to marked right foraminal stenosis at C3-4 and C6-7  and left-sided foraminal stenosis at C5-6. Emphysematous changes are noted in the region of the lung apex.   Findings thoracic spine: No acute fracture or posttraumatic subluxation was identified in the thoracic region. No high-grade spinal canal or foraminal stenosis was identified within the limits of this exam.   Findings lumbar spine: No acute fracture or clear posttraumatic spondylolisthesis was identified in the lumbar region. Grade 1 anterolisthesis L3 over L4. Patient is status post laminectomy at L4 and L5. There are moderate disc bulges and/or central and paracentral herniations indenting the ventral thecal sac and moderately narrowing the neuroforamina at L3-4 L4-5 and L5-S1.       No acute intracranial abnormality or calvarial fracture was identified with significant scalp edema on a posttraumatic basis. No acute fracture or posttraumatic subluxation of the vertebral column was identified with findings as above.   MACRO: None   Signed by: Mazin Jaimes 9/24/2024 11:39 AM Dictation workstation:   [id number]    CT chest abdomen pelvis wo IV contrast    Result Date: 9/24/2024  Interpreted By:  Jericho Bearden, STUDY: CT CHEST ABDOMEN PELVIS WO CONTRAST; 9/24/2024 11:15 am   INDICATION: Signs/Symptoms:Trauma.   COMPARISON: None.   ACCESSION NUMBER(S): [id number]   ORDERING CLINICIAN: GUY MARCELO   TECHNIQUE: CT of the chest, abdomen, and pelvis was performed without contrast. Contiguous axial images were obtained through the chest, abdomen and pelvis. Coronal and sagittal reconstructions were also created.   FINDINGS: Lack of intravenous contrast is suboptimal for evaluation of the vasculature and viscera.   CARDIOVASCULAR: Heart size is within normal limits. The thoracic aorta is not aneurysmal.   MEDIASTINUM, JACQUIE AND LYMPH NODES: No adenopathy is evident. No evidence of mediastinal hemorrhage.   PLEURA AND PERICARDIUM: Unremarkable.   CENTRAL AIRWAYS AND PULMONARY: No evidence of consolidation or  contusion. Emphysema is present. Calcifications in the left lower lobe medially may reflect prior granulomatous exposure.   LIVER: Unremarkable.   BILE DUCTS: No biliary dilation.   GALLBLADDER: Unremarkable.   PANCREAS: Unremarkable.   SPLEEN: Unremarkable.   ADRENAL GLANDS: Unremarkable.   KIDNEYS, URETERS AND BLADDER: A few rounded hypodense lesions in the kidneys are likely cysts, but incompletely assessed without contrast. No evidence of acute traumatic injury. No evidence of obstructive uropathy.   REPRODUCTIVE ORGANS: Unremarkable.   BOWEL: No evidence of obstruction or inflammation. The appendix is within normal limits.   VESSELS: Areas of calcific atherosclerosis are present in the nonaneurysmal abdominal aorta.   PERITONEUM/RETROPERITONEUM/LYMPH NODES: No free fluid or free air. No adenopathy is evident.   MUSCULOSKELETAL: No destructive osseous lesion or acute fracture is evident. Please see dedicated spine report for details regarding the spine.       1. No evidence of acute traumatic injury given noncontrast technique. 2. Probable renal cysts are incompletely assessed in the current study. Further characterization by ultrasound on a routine outpatient basis is recommended.   Signed by: Jericho Bearden 9/24/2024 11:37 AM Dictation workstation:   [id number]    XR chest 1 view    Result Date: 9/24/2024  Interpreted By:  Doc Smith and Gupta Jayesh STUDY: XR CHEST 1 VIEW;  9/24/2024 10:54 am   INDICATION: Signs/Symptoms:Trauma.   COMPARISON: None.   ACCESSION NUMBER(S): [id number]   ORDERING CLINICIAN: GUY MARCELO   TECHNIQUE: AP portable supine radiograph of the chest was provided.   FINDINGS: Medical devices:Cervical collar in place.   CARDIOMEDIASTINAL SILHOUETTE: Cardiomediastinal silhouette is appropriate in size and configuration.   LUNGS: No consolidation, edema, effusion, or pneumothorax. Emphysematous changes of the lungs.   ABDOMEN: No upper abdominal findings. Prominent bilateral  bronchovascular markings.   BONES: No osseous changes.       1. Prominent bilateral bronchovascular markings. Otherwise, no acute cardiopulmonary process.   I personally reviewed the images/study and I agree with the findings as stated by Rachid Calderon MD. This study was interpreted at [place], [place].   MACRO: None   Signed by: Doc Smith 9/24/2024 11:15 AM Dictation workstation:   [id number]    XR pelvis 1-2 views    Result Date: 9/24/2024  Interpreted By:  Doc Smith and Gupta Jayesh STUDY: XR PELVIS 1-2 VIEWS; ;  9/24/2024 10:54 am   INDICATION: Signs/Symptoms:trauma.   COMPARISON: None.   ACCESSION NUMBER(S): [id number]   ORDERING CLINICIAN: GUY MARCELO   FINDINGS: Pelvis: The femoroacetabular joints are maintained in their position, without evidence of fracture or dislocation. The ilioischial and iliopubic lines are maintained. Degenerative changes of the spine and bilateral hip joints.       No evidence of acute fracture or dislocation.   I personally reviewed the images/study and I agree with the findings as stated by Rachid Calderon MD. This study was interpreted at [place], [place].   Signed by: Doc Smith 9/24/2024 11:13 AM Dictation workstation:   [id number]     Scheduled medications  [Held by provider] aspirin, 81 mg, oral, Daily  [Held by provider] enoxaparin, 40 mg, subcutaneous, q24h  insulin lispro, 0-5 Units, subcutaneous, TID  iohexol, 150 mL, intravenous, Once in imaging  melatonin, 10 mg, oral, Nightly  polyethylene glycol, 17 g, oral, Daily  risperiDONE, 2.5 mg, oral, Daily  tamsulosin, 0.4 mg, oral, Daily  traZODone, 100 mg, oral, Nightly      Continuous medications  sodium chloride 0.9%, 200 mL/hr, Last Rate: 200 mL/hr (09/27/24 2057)      PRN medications  PRN medications: acetaminophen, dextrose, dextrose, glucagon, glucagon, oxyCODONE    Results for orders placed or performed  during the hospital encounter of 09/24/24 (from the past 24 hour(s))   POCT GLUCOSE   Result Value Ref Range    POCT Glucose 124 (H) 74 - 99 mg/dL   DRUG SCREEN,URINE   Result Value Ref Range    Amphetamine Screen, Urine Presumptive Negative Presumptive Negative    Barbiturate Screen, Urine Presumptive Negative Presumptive Negative    Benzodiazepines Screen, Urine Presumptive Negative Presumptive Negative    Cannabinoid Screen, Urine Presumptive Negative Presumptive Negative    Cocaine Metabolite Screen, Urine Presumptive Negative Presumptive Negative    Fentanyl Screen, Urine Presumptive Negative Presumptive Negative    Opiate Screen, Urine Presumptive Negative Presumptive Negative    Oxycodone Screen, Urine Presumptive Positive (A) Presumptive Negative    PCP Screen, Urine Presumptive Negative Presumptive Negative    Methadone Screen, Urine Presumptive Negative Presumptive Negative   POCT GLUCOSE   Result Value Ref Range    POCT Glucose 147 (H) 74 - 99 mg/dL   CBC and Auto Differential   Result Value Ref Range    WBC 3.5 (L) 4.4 - 11.3 x10*3/uL    nRBC 0.0 0.0 - 0.0 /100 WBCs    RBC 4.84 4.50 - 5.90 x10*6/uL    Hemoglobin 13.6 13.5 - 17.5 g/dL    Hematocrit 41.7 41.0 - 52.0 %    MCV 86 80 - 100 fL    MCH 28.1 26.0 - 34.0 pg    MCHC 32.6 32.0 - 36.0 g/dL    RDW 13.6 11.5 - 14.5 %    Platelets 158 150 - 450 x10*3/uL    Neutrophils % 66.8 40.0 - 80.0 %    Immature Granulocytes %, Automated 0.3 0.0 - 0.9 %    Lymphocytes % 19.8 13.0 - 44.0 %    Monocytes % 10.5 2.0 - 10.0 %    Eosinophils % 2.0 0.0 - 6.0 %    Basophils % 0.6 0.0 - 2.0 %    Neutrophils Absolute 2.36 1.60 - 5.50 x10*3/uL    Immature Granulocytes Absolute, Automated 0.01 0.00 - 0.50 x10*3/uL    Lymphocytes Absolute 0.70 (L) 0.80 - 3.00 x10*3/uL    Monocytes Absolute 0.37 0.05 - 0.80 x10*3/uL    Eosinophils Absolute 0.07 0.00 - 0.40 x10*3/uL    Basophils Absolute 0.02 0.00 - 0.10 x10*3/uL   Renal Function Panel   Result Value Ref Range    Glucose 126 (H)  74 - 99 mg/dL    Sodium 137 136 - 145 mmol/L    Potassium 3.7 3.5 - 5.3 mmol/L    Chloride 101 98 - 107 mmol/L    Bicarbonate 27 21 - 32 mmol/L    Anion Gap 13 10 - 20 mmol/L    Urea Nitrogen 8 6 - 23 mg/dL    Creatinine 0.79 0.50 - 1.30 mg/dL    eGFR >90 >60 mL/min/1.73m*2    Calcium 8.9 8.6 - 10.6 mg/dL    Phosphorus 2.0 (L) 2.5 - 4.9 mg/dL    Albumin 3.7 3.4 - 5.0 g/dL   Magnesium   Result Value Ref Range    Magnesium 2.11 1.60 - 2.40 mg/dL   Creatine Kinase   Result Value Ref Range    Creatine Kinase 2,598 (H) 0 - 325 U/L   POCT GLUCOSE   Result Value Ref Range    POCT Glucose 175 (H) 74 - 99 mg/dL   POCT GLUCOSE   Result Value Ref Range    POCT Glucose 127 (H) 74 - 99 mg/dL          Assessment/Plan   Occipital trauma with hematoma/Unstageable pressure injury to occipital area:   A/P:  - no emergent surgical indication at this time  - bedside debridement, see separate procedure note for details  - wound care following  - avoid pressure to occipital head wound  - wound culture obtained for purulent drainage, follow up results, order placed  - continue daily dry to dry dressing changes  - may require further debridement in the OR, timing TBD if needed  - Discussed with Dr. Danielle, Dr. Barbosa, ENT and Dr. aPtino  - Plastics to follow    ROBERTA Wang-CNP  Plastic and Reconstructive Surgery   Available via [employer]  Pager #[phone]  Team phone: [phone]      Mastoid Interpolation Flap Text: A decision was made to reconstruct the defect utilizing an interpolation axial flap and a staged reconstruction.  A telfa template was made of the defect.  This telfa template was then used to outline the mastoid interpolation flap.  The donor area for the pedicle flap was then injected with anesthesia.  The flap was excised through the skin and subcutaneous tissue down to the layer of the underlying musculature.  The pedicle flap was carefully excised within this deep plane to maintain its blood supply.  The edges of the donor site were undermined.   The donor site was closed in a primary fashion.  The pedicle was then rotated into position and sutured.  Once the tube was sutured into place, adequate blood supply was confirmed with blanching and refill.  The pedicle was then wrapped with xeroform gauze and dressed appropriately with a telfa and gauze bandage to ensure continued blood supply and protect the attached pedicle.

## 2024-09-28 NOTE — H&P (VIEW-ONLY)
Reason For Consult  Occipital scalp trauma with hematoma    History Of Present Illness  Seven Robin is a 74 y.o. male who presented to [facility] on 9/24/2024 as a full trauma activation for mechanical fall with head strike and deformity of the skull after falling from table and remaining on the ground for about 12 hours. Reports he was reaching for something and was standing on a table when he fell backwards, he kept trying to get up but was too weak and was down for 12 hours, lives with roommate who didn't come home until the morning. Reports no  loss of consciousness and can recall the fall.  Extensive imaging in the ED with no acute findings.  He has remained alert and oriented x 3 with no focal deficits for the duration of his hospitalization.  Had lactic acidosis and rhabdo, improving s/p fluids. Had significant occipital wound with necrosis and wound care was consulted and recommended daily saline irrigation, xeroform dressing with Mepilex border dressing. ENT consulted for occipital wound and facial swelling. Denies any fever, chills, night sweats, CP, SOB, palpitations, nausea, vomiting, diarrhea, constipation, dysuria, hematuria, hematochezia, hematemesis, flank pain. C/o 8/10 generalized to body and head, worse with movement, better with rest, feels that he is getting stronger but still feels weak. Wound care consulted for head wound, unstageable pressure injury, covered with xeroform and mepilex. Plastic Surgery was consulted for management of occipital wound.      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     Social History  He has no history on file for tobacco use, alcohol use, and drug use.    Family History  No family history on file.     Allergies  Patient has no known allergies.    Review of Systems  ROS: All 10 systems were reviewed and are  "unremarkable except for those mentioned in HPI.      Physical Exam  Constitutional: A&Ox3, calm and cooperative, NAD.  Eyes: PERRL, EOMI  ENMT: Moist mucous membranes, no apparent injuries or lesions. Small lacerations on left lower lip and left side of chin, appear to be healing, scabbing on chin wounds, mild lower lip edema.  Head/Neck: NC/AT. Scalp has large pressure wound on superior posterior aspect. 15X 7 cm. Black eschar surrounded by red, moist periwound. Scant clear serous drainage present, purulent drainage at border. (See below picture)   Cardiovascular: Normal rate and regular rhythm. 2+ equal pulses of the distal extremities.  Respiratory/Thorax: CTAB, regular respirations on RA. Good symmetric chest expansion.   Gastrointestinal: Abdomen soft, NTND, +BS x4, +BM  Genitourinary: voiding independently via urinal, clear  yellow output  Extremities: No peripheral edema. Generalized weakness, 1+ edema to BLE and BUE, abrasions to left elbow and shoulder, knees  Neurological: A&Ox3.   Psychological: Appropriate mood and behavior.                   Last Recorded Vitals  Blood pressure (!) 164/99, pulse 97, temperature 36.9 °C (98.4 °F), resp. rate 16, height 1.803 m (5' 11\"), weight 94.3 kg (208 lb), SpO2 98%.    Relevant Results  MR brain wo IV contrast    Result Date: 9/27/2024  Interpreted By:  Giovanni Valerio,  and Karolyn Conner STUDY: MR BRAIN WO IV CONTRAST;  9/26/2024 8:00 pm   INDICATION: Signs/Symptoms:left facial drop after fall with significant head injury, >24 hrs. pt reports longstanding v. since intial injury.   COMPARISON: CT head 09/24/2024   ACCESSION NUMBER(S): [id number]   ORDERING CLINICIAN: YOU FRANCOIS   TECHNIQUE: Axial T2, FLAIR, DWI, gradient echo T2 and sagittal and coronal T1 weighted images of the brain were acquired.   FINDINGS: There is no diffusion restriction to suggest acute infarction.   There is subtle FLAIR hyperintense, T1 isointense subdural collection " present along the medial aspect of the right occipital lobe (series 3, image 20) with blooming artifact present in the dark gradient echo images (series 6, image 20) with asymmetric T1 isointense thickening of the right tentorium, suggestive of layering acute subdural hematoma between the right occipital lobe and the tentorium (series 7, image 34)   There is also a T2 hyperintense, T1 isointense extra-axial collection overlying the right frontal region measuring up to 3 mm in maximal thickness, essentially unchanged in appearance to prior CT head. There may be slight mass effect on the adjacent brain parenchyma, with a 2-3 mm rightward shift of midline structures at the septum pellucidum.   The ventricles and sulci prominent likely due to diffuse parenchymal volume loss. No new ventricular dilatation is identified. Basal cisterns are patent.   There are extensive periventricular and subcortical T2/FLAIR hyperintensities likely represent sequela of small-vessel ischemic changes.   Fluid and edema are again present in the scalp soft tissues bilaterally, perhaps slightly improved compared to prior exam on 09/24/2024.   Visualized large arterial flow voids, including intracranial carotid and basilar artery are preserved.   No abnormal fluid signal is present in the paranasal sinuses or mastoid air cells.       1. No diffusion restriction abnormality is present to suggest an acute infarct. 2. A FLAIR hyperintense, T1 isointense subdural hematoma is layering between the right occipital lobe in the tentorium, measuring between 2-3 mm in thickness, with minimal mass effect on the adjacent brain parenchyma, new/increased in size from prior CT head dated 09/24/2024. 3. A subtle T2 hyperintense collection overlies the right frontal lobe, measuring up to 3 mm in maximal thickness, with minimal if any mass effect on the adjacent brain parenchyma, and a 2-3 mm leftward shift of midline structures. Collection demonstrates no  associated FLAIR hyperintensity or blooming on gradient echo and may represent a more chronic hygroma. 4. Moderate diffuse brain parenchymal volume loss with patchy and confluence areas of hyperintense FLAIR and T2 signal are present in the periventricular and subcortical white matter of bilateral cerebral hemispheres, nonspecific findings favored to represent sequela of microvascular disease. 5. Scalp swelling and edema slightly improved from prior CT dated 09/24/2024.   I personally reviewed the images/study and I agree with the findings as stated by resident physician Dr. Ubaldo Cortes . This study was interpreted at [facility], [place], [place].   MACRO: None   Signed by: Giovanni Valerio 9/27/2024 1:34 AM Dictation workstation:   [id number]    XR wrist left 3+ views    Result Date: 9/24/2024  Interpreted By:  Doc Smith, STUDY: XR WRIST LEFT 3+ VIEWS; ;  9/24/2024 2:05 pm   INDICATION: Signs/Symptoms:Wrist pain.     COMPARISON: None.   ACCESSION NUMBER(S): [id number]   ORDERING CLINICIAN: ARIANNE LADD   FINDINGS: Three views of the left wrist and three views of the left hand   No definitive fracture. Joint alignment is maintained. Soft tissue edema of the wrist.       No definitive fracture.     MACRO: None   Signed by: Doc Smith 9/24/2024 2:47 PM Dictation workstation:   [id number]    XR hand left 3+ views    Result Date: 9/24/2024  Interpreted By:  Doc Smith, STUDY: XR HAND LEFT 3+ VIEWS; ;  9/24/2024 2:05 pm   INDICATION: Signs/Symptoms:Pain.     COMPARISON: None.   ACCESSION NUMBER(S): [id number]   ORDERING CLINICIAN: ARIANNE LADD   FINDINGS: Three views of the left wrist and three views of the left hand   No definitive fracture. Joint alignment is maintained. Soft tissue edema of the wrist.       No definitive fracture.     MACRO: None   Signed by: Doc Smith 9/24/2024 2:39 PM Dictation workstation:   [id number]    CT cervical spine  wo IV contrast    Result Date: 9/24/2024  Interpreted By:  Mazin Jaimes, STUDY: CT CERVICAL SPINE WO IV CONTRAST; CT THORACIC SPINE WO IV CONTRAST; CT LUMBAR SPINE WO IV CONTRAST; CT HEAD W/O CONTRAST TRAUMA PROTOCOL; 9/24/2024 11:08 am; 9/24/2024 11:15 am   INDICATION: Signs/Symptoms:TRAUMA; Signs/Symptoms:trauma; Signs/Symptoms:CF SKULL FRACTURE AND ICH.     COMPARISON: None.   ACCESSION NUMBER(S): [id number]; [id number]; [id number]; [id number]   ORDERING CLINICIAN: GUY MARCELO   TECHNIQUE: Axial CT images of the head, cervical, thoracic, and lumbar spine are obtained. Axial, coronal and sagittal reconstructions are provided for review.   FINDINGS: Findings head CT: Moderate scalp swelling on a posttraumatic basis noted. No convincing evidence for acute intracranial hemorrhage with increased attenuation along the falx cerebri probably due to minimal calcification. No mass effect or midline shift. Minimal to moderate dilatation of the lateral and 3rd ventricles at least partly due to global parenchymal volume loss. Moderate nonspecific periventricular white matter hypodensity may be due to chronic small vessel ischemic changes given the age of the patient. No evidence of calvarial fracture was identified. Visualized paranasal sinuses and mastoid air cells are clear.   Findings cervical spine: No acute fracture or posttraumatic subluxation of the cervical vertebral bodies was identified. Partially calcified disc herniations are noted at C2-3 C3-4 C4-5 C5-6 C6-7 which along with posterior endplate osteophytes indent the ventral thecal sac. There is moderate to marked right foraminal stenosis at C3-4 and C6-7 and left-sided foraminal stenosis at C5-6. Emphysematous changes are noted in the region of the lung apex.   Findings thoracic spine: No acute fracture or posttraumatic subluxation was identified in the thoracic region. No high-grade spinal canal or foraminal stenosis was identified within the limits of  this exam.   Findings lumbar spine: No acute fracture or clear posttraumatic spondylolisthesis was identified in the lumbar region. Grade 1 anterolisthesis L3 over L4. Patient is status post laminectomy at L4 and L5. There are moderate disc bulges and/or central and paracentral herniations indenting the ventral thecal sac and moderately narrowing the neuroforamina at L3-4 L4-5 and L5-S1.       No acute intracranial abnormality or calvarial fracture was identified with significant scalp edema on a posttraumatic basis. No acute fracture or posttraumatic subluxation of the vertebral column was identified with findings as above.   MACRO: None   Signed by: Mazin Jaimes 9/24/2024 11:39 AM Dictation workstation:   [id number]    CT thoracic spine wo IV contrast    Result Date: 9/24/2024  Interpreted By:  Mazin Jaimes, STUDY: CT CERVICAL SPINE WO IV CONTRAST; CT THORACIC SPINE WO IV CONTRAST; CT LUMBAR SPINE WO IV CONTRAST; CT HEAD W/O CONTRAST TRAUMA PROTOCOL; 9/24/2024 11:08 am; 9/24/2024 11:15 am   INDICATION: Signs/Symptoms:TRAUMA; Signs/Symptoms:trauma; Signs/Symptoms:CF SKULL FRACTURE AND ICH.     COMPARISON: None.   ACCESSION NUMBER(S): [id number]; [id number]; [id number]; [id number]   ORDERING CLINICIAN: GUY MARCELO   TECHNIQUE: Axial CT images of the head, cervical, thoracic, and lumbar spine are obtained. Axial, coronal and sagittal reconstructions are provided for review.   FINDINGS: Findings head CT: Moderate scalp swelling on a posttraumatic basis noted. No convincing evidence for acute intracranial hemorrhage with increased attenuation along the falx cerebri probably due to minimal calcification. No mass effect or midline shift. Minimal to moderate dilatation of the lateral and 3rd ventricles at least partly due to global parenchymal volume loss. Moderate nonspecific periventricular white matter hypodensity may be due to chronic small vessel ischemic changes given the age of the patient. No evidence of  calvarial fracture was identified. Visualized paranasal sinuses and mastoid air cells are clear.   Findings cervical spine: No acute fracture or posttraumatic subluxation of the cervical vertebral bodies was identified. Partially calcified disc herniations are noted at C2-3 C3-4 C4-5 C5-6 C6-7 which along with posterior endplate osteophytes indent the ventral thecal sac. There is moderate to marked right foraminal stenosis at C3-4 and C6-7 and left-sided foraminal stenosis at C5-6. Emphysematous changes are noted in the region of the lung apex.   Findings thoracic spine: No acute fracture or posttraumatic subluxation was identified in the thoracic region. No high-grade spinal canal or foraminal stenosis was identified within the limits of this exam.   Findings lumbar spine: No acute fracture or clear posttraumatic spondylolisthesis was identified in the lumbar region. Grade 1 anterolisthesis L3 over L4. Patient is status post laminectomy at L4 and L5. There are moderate disc bulges and/or central and paracentral herniations indenting the ventral thecal sac and moderately narrowing the neuroforamina at L3-4 L4-5 and L5-S1.       No acute intracranial abnormality or calvarial fracture was identified with significant scalp edema on a posttraumatic basis. No acute fracture or posttraumatic subluxation of the vertebral column was identified with findings as above.   MACRO: None   Signed by: Mazin Jaimes 9/24/2024 11:39 AM Dictation workstation:   [id number]    CT lumbar spine wo IV contrast    Result Date: 9/24/2024  Interpreted By:  Mazin Jaimes, STUDY: CT CERVICAL SPINE WO IV CONTRAST; CT THORACIC SPINE WO IV CONTRAST; CT LUMBAR SPINE WO IV CONTRAST; CT HEAD W/O CONTRAST TRAUMA PROTOCOL; 9/24/2024 11:08 am; 9/24/2024 11:15 am   INDICATION: Signs/Symptoms:TRAUMA; Signs/Symptoms:trauma; Signs/Symptoms:CF SKULL FRACTURE AND ICH.     COMPARISON: None.   ACCESSION NUMBER(S): [id number]; [id number]; [id number];  [id number]   ORDERING CLINICIAN: GUY MARCELO   TECHNIQUE: Axial CT images of the head, cervical, thoracic, and lumbar spine are obtained. Axial, coronal and sagittal reconstructions are provided for review.   FINDINGS: Findings head CT: Moderate scalp swelling on a posttraumatic basis noted. No convincing evidence for acute intracranial hemorrhage with increased attenuation along the falx cerebri probably due to minimal calcification. No mass effect or midline shift. Minimal to moderate dilatation of the lateral and 3rd ventricles at least partly due to global parenchymal volume loss. Moderate nonspecific periventricular white matter hypodensity may be due to chronic small vessel ischemic changes given the age of the patient. No evidence of calvarial fracture was identified. Visualized paranasal sinuses and mastoid air cells are clear.   Findings cervical spine: No acute fracture or posttraumatic subluxation of the cervical vertebral bodies was identified. Partially calcified disc herniations are noted at C2-3 C3-4 C4-5 C5-6 C6-7 which along with posterior endplate osteophytes indent the ventral thecal sac. There is moderate to marked right foraminal stenosis at C3-4 and C6-7 and left-sided foraminal stenosis at C5-6. Emphysematous changes are noted in the region of the lung apex.   Findings thoracic spine: No acute fracture or posttraumatic subluxation was identified in the thoracic region. No high-grade spinal canal or foraminal stenosis was identified within the limits of this exam.   Findings lumbar spine: No acute fracture or clear posttraumatic spondylolisthesis was identified in the lumbar region. Grade 1 anterolisthesis L3 over L4. Patient is status post laminectomy at L4 and L5. There are moderate disc bulges and/or central and paracentral herniations indenting the ventral thecal sac and moderately narrowing the neuroforamina at L3-4 L4-5 and L5-S1.       No acute intracranial abnormality or calvarial  fracture was identified with significant scalp edema on a posttraumatic basis. No acute fracture or posttraumatic subluxation of the vertebral column was identified with findings as above.   MACRO: None   Signed by: Mazin Jaimes 9/24/2024 11:39 AM Dictation workstation:   [id number]    CT head W O contrast trauma protocol    Result Date: 9/24/2024  Interpreted By:  Mazin Jaimes, STUDY: CT CERVICAL SPINE WO IV CONTRAST; CT THORACIC SPINE WO IV CONTRAST; CT LUMBAR SPINE WO IV CONTRAST; CT HEAD W/O CONTRAST TRAUMA PROTOCOL; 9/24/2024 11:08 am; 9/24/2024 11:15 am   INDICATION: Signs/Symptoms:TRAUMA; Signs/Symptoms:trauma; Signs/Symptoms:CF SKULL FRACTURE AND ICH.     COMPARISON: None.   ACCESSION NUMBER(S): [id number]; [id number]; [id number]; [id number]   ORDERING CLINICIAN: GUY MARCELO   TECHNIQUE: Axial CT images of the head, cervical, thoracic, and lumbar spine are obtained. Axial, coronal and sagittal reconstructions are provided for review.   FINDINGS: Findings head CT: Moderate scalp swelling on a posttraumatic basis noted. No convincing evidence for acute intracranial hemorrhage with increased attenuation along the falx cerebri probably due to minimal calcification. No mass effect or midline shift. Minimal to moderate dilatation of the lateral and 3rd ventricles at least partly due to global parenchymal volume loss. Moderate nonspecific periventricular white matter hypodensity may be due to chronic small vessel ischemic changes given the age of the patient. No evidence of calvarial fracture was identified. Visualized paranasal sinuses and mastoid air cells are clear.   Findings cervical spine: No acute fracture or posttraumatic subluxation of the cervical vertebral bodies was identified. Partially calcified disc herniations are noted at C2-3 C3-4 C4-5 C5-6 C6-7 which along with posterior endplate osteophytes indent the ventral thecal sac. There is moderate to marked right foraminal stenosis at C3-4 and C6-7  and left-sided foraminal stenosis at C5-6. Emphysematous changes are noted in the region of the lung apex.   Findings thoracic spine: No acute fracture or posttraumatic subluxation was identified in the thoracic region. No high-grade spinal canal or foraminal stenosis was identified within the limits of this exam.   Findings lumbar spine: No acute fracture or clear posttraumatic spondylolisthesis was identified in the lumbar region. Grade 1 anterolisthesis L3 over L4. Patient is status post laminectomy at L4 and L5. There are moderate disc bulges and/or central and paracentral herniations indenting the ventral thecal sac and moderately narrowing the neuroforamina at L3-4 L4-5 and L5-S1.       No acute intracranial abnormality or calvarial fracture was identified with significant scalp edema on a posttraumatic basis. No acute fracture or posttraumatic subluxation of the vertebral column was identified with findings as above.   MACRO: None   Signed by: Mazin Jaimes 9/24/2024 11:39 AM Dictation workstation:   [id number]    CT chest abdomen pelvis wo IV contrast    Result Date: 9/24/2024  Interpreted By:  Jericho Bearden, STUDY: CT CHEST ABDOMEN PELVIS WO CONTRAST; 9/24/2024 11:15 am   INDICATION: Signs/Symptoms:Trauma.   COMPARISON: None.   ACCESSION NUMBER(S): [id number]   ORDERING CLINICIAN: GUY MARCELO   TECHNIQUE: CT of the chest, abdomen, and pelvis was performed without contrast. Contiguous axial images were obtained through the chest, abdomen and pelvis. Coronal and sagittal reconstructions were also created.   FINDINGS: Lack of intravenous contrast is suboptimal for evaluation of the vasculature and viscera.   CARDIOVASCULAR: Heart size is within normal limits. The thoracic aorta is not aneurysmal.   MEDIASTINUM, JACQUIE AND LYMPH NODES: No adenopathy is evident. No evidence of mediastinal hemorrhage.   PLEURA AND PERICARDIUM: Unremarkable.   CENTRAL AIRWAYS AND PULMONARY: No evidence of consolidation or  contusion. Emphysema is present. Calcifications in the left lower lobe medially may reflect prior granulomatous exposure.   LIVER: Unremarkable.   BILE DUCTS: No biliary dilation.   GALLBLADDER: Unremarkable.   PANCREAS: Unremarkable.   SPLEEN: Unremarkable.   ADRENAL GLANDS: Unremarkable.   KIDNEYS, URETERS AND BLADDER: A few rounded hypodense lesions in the kidneys are likely cysts, but incompletely assessed without contrast. No evidence of acute traumatic injury. No evidence of obstructive uropathy.   REPRODUCTIVE ORGANS: Unremarkable.   BOWEL: No evidence of obstruction or inflammation. The appendix is within normal limits.   VESSELS: Areas of calcific atherosclerosis are present in the nonaneurysmal abdominal aorta.   PERITONEUM/RETROPERITONEUM/LYMPH NODES: No free fluid or free air. No adenopathy is evident.   MUSCULOSKELETAL: No destructive osseous lesion or acute fracture is evident. Please see dedicated spine report for details regarding the spine.       1. No evidence of acute traumatic injury given noncontrast technique. 2. Probable renal cysts are incompletely assessed in the current study. Further characterization by ultrasound on a routine outpatient basis is recommended.   Signed by: Jericho Bearden 9/24/2024 11:37 AM Dictation workstation:   [id number]    XR chest 1 view    Result Date: 9/24/2024  Interpreted By:  Doc Smith and Gupta Jayesh STUDY: XR CHEST 1 VIEW;  9/24/2024 10:54 am   INDICATION: Signs/Symptoms:Trauma.   COMPARISON: None.   ACCESSION NUMBER(S): [id number]   ORDERING CLINICIAN: GUY MARCELO   TECHNIQUE: AP portable supine radiograph of the chest was provided.   FINDINGS: Medical devices:Cervical collar in place.   CARDIOMEDIASTINAL SILHOUETTE: Cardiomediastinal silhouette is appropriate in size and configuration.   LUNGS: No consolidation, edema, effusion, or pneumothorax. Emphysematous changes of the lungs.   ABDOMEN: No upper abdominal findings. Prominent bilateral  bronchovascular markings.   BONES: No osseous changes.       1. Prominent bilateral bronchovascular markings. Otherwise, no acute cardiopulmonary process.   I personally reviewed the images/study and I agree with the findings as stated by Rachid Calderon MD. This study was interpreted at [place], [place].   MACRO: None   Signed by: Doc Smith 9/24/2024 11:15 AM Dictation workstation:   [id number]    XR pelvis 1-2 views    Result Date: 9/24/2024  Interpreted By:  Doc Smith and Gupta Jayesh STUDY: XR PELVIS 1-2 VIEWS; ;  9/24/2024 10:54 am   INDICATION: Signs/Symptoms:trauma.   COMPARISON: None.   ACCESSION NUMBER(S): [id number]   ORDERING CLINICIAN: GUY MARCELO   FINDINGS: Pelvis: The femoroacetabular joints are maintained in their position, without evidence of fracture or dislocation. The ilioischial and iliopubic lines are maintained. Degenerative changes of the spine and bilateral hip joints.       No evidence of acute fracture or dislocation.   I personally reviewed the images/study and I agree with the findings as stated by Rachid Calderon MD. This study was interpreted at [place], [place].   Signed by: Doc Smith 9/24/2024 11:13 AM Dictation workstation:   [id number]     Scheduled medications  [Held by provider] aspirin, 81 mg, oral, Daily  [Held by provider] enoxaparin, 40 mg, subcutaneous, q24h  insulin lispro, 0-5 Units, subcutaneous, TID  iohexol, 150 mL, intravenous, Once in imaging  melatonin, 10 mg, oral, Nightly  polyethylene glycol, 17 g, oral, Daily  risperiDONE, 2.5 mg, oral, Daily  tamsulosin, 0.4 mg, oral, Daily  traZODone, 100 mg, oral, Nightly      Continuous medications  sodium chloride 0.9%, 200 mL/hr, Last Rate: 200 mL/hr (09/27/24 2057)      PRN medications  PRN medications: acetaminophen, dextrose, dextrose, glucagon, glucagon, oxyCODONE    Results for orders placed or performed  during the hospital encounter of 09/24/24 (from the past 24 hour(s))   POCT GLUCOSE   Result Value Ref Range    POCT Glucose 124 (H) 74 - 99 mg/dL   DRUG SCREEN,URINE   Result Value Ref Range    Amphetamine Screen, Urine Presumptive Negative Presumptive Negative    Barbiturate Screen, Urine Presumptive Negative Presumptive Negative    Benzodiazepines Screen, Urine Presumptive Negative Presumptive Negative    Cannabinoid Screen, Urine Presumptive Negative Presumptive Negative    Cocaine Metabolite Screen, Urine Presumptive Negative Presumptive Negative    Fentanyl Screen, Urine Presumptive Negative Presumptive Negative    Opiate Screen, Urine Presumptive Negative Presumptive Negative    Oxycodone Screen, Urine Presumptive Positive (A) Presumptive Negative    PCP Screen, Urine Presumptive Negative Presumptive Negative    Methadone Screen, Urine Presumptive Negative Presumptive Negative   POCT GLUCOSE   Result Value Ref Range    POCT Glucose 147 (H) 74 - 99 mg/dL   CBC and Auto Differential   Result Value Ref Range    WBC 3.5 (L) 4.4 - 11.3 x10*3/uL    nRBC 0.0 0.0 - 0.0 /100 WBCs    RBC 4.84 4.50 - 5.90 x10*6/uL    Hemoglobin 13.6 13.5 - 17.5 g/dL    Hematocrit 41.7 41.0 - 52.0 %    MCV 86 80 - 100 fL    MCH 28.1 26.0 - 34.0 pg    MCHC 32.6 32.0 - 36.0 g/dL    RDW 13.6 11.5 - 14.5 %    Platelets 158 150 - 450 x10*3/uL    Neutrophils % 66.8 40.0 - 80.0 %    Immature Granulocytes %, Automated 0.3 0.0 - 0.9 %    Lymphocytes % 19.8 13.0 - 44.0 %    Monocytes % 10.5 2.0 - 10.0 %    Eosinophils % 2.0 0.0 - 6.0 %    Basophils % 0.6 0.0 - 2.0 %    Neutrophils Absolute 2.36 1.60 - 5.50 x10*3/uL    Immature Granulocytes Absolute, Automated 0.01 0.00 - 0.50 x10*3/uL    Lymphocytes Absolute 0.70 (L) 0.80 - 3.00 x10*3/uL    Monocytes Absolute 0.37 0.05 - 0.80 x10*3/uL    Eosinophils Absolute 0.07 0.00 - 0.40 x10*3/uL    Basophils Absolute 0.02 0.00 - 0.10 x10*3/uL   Renal Function Panel   Result Value Ref Range    Glucose 126 (H)  74 - 99 mg/dL    Sodium 137 136 - 145 mmol/L    Potassium 3.7 3.5 - 5.3 mmol/L    Chloride 101 98 - 107 mmol/L    Bicarbonate 27 21 - 32 mmol/L    Anion Gap 13 10 - 20 mmol/L    Urea Nitrogen 8 6 - 23 mg/dL    Creatinine 0.79 0.50 - 1.30 mg/dL    eGFR >90 >60 mL/min/1.73m*2    Calcium 8.9 8.6 - 10.6 mg/dL    Phosphorus 2.0 (L) 2.5 - 4.9 mg/dL    Albumin 3.7 3.4 - 5.0 g/dL   Magnesium   Result Value Ref Range    Magnesium 2.11 1.60 - 2.40 mg/dL   Creatine Kinase   Result Value Ref Range    Creatine Kinase 2,598 (H) 0 - 325 U/L   POCT GLUCOSE   Result Value Ref Range    POCT Glucose 175 (H) 74 - 99 mg/dL   POCT GLUCOSE   Result Value Ref Range    POCT Glucose 127 (H) 74 - 99 mg/dL          Assessment/Plan   Occipital trauma with hematoma/Unstageable pressure injury to occipital area:   A/P:  - no emergent surgical indication at this time  - bedside debridement, see separate procedure note for details  - wound care following  - avoid pressure to occipital head wound  - wound culture obtained for purulent drainage, follow up results, order placed  - continue daily dry to dry dressing changes  - may require further debridement in the OR, timing TBD if needed  - Discussed with Dr. Danielle, Dr. Barbosa, ENT and Dr. Patino  - Plastics to follow    ROBERTA Wang-CNP  Plastic and Reconstructive Surgery   Available via [employer]  Pager #[phone]  Team phone: [phone]

## 2024-09-28 NOTE — PROGRESS NOTES
Assessment/Plan   Seven Robin is a 74 y.o. male with PMHx of HTN, HLD, DM, cirrhosis 2/2 treated Hep C (Tx 7675-3070), BPH, schizoaffective disorder and prostate CA s/p EBRT presenting to the ED s/p mechanical fall leading to trauma activation. Pt reports he was standing on a table when he fell backwards hitting his head last night. No proceeding symptoms, no b/b incontinence, or AMS post-event. Has a large postior scalp contusion. No new neuro deficits. Denies any other associated symptoms other than pain in his head and back. Trauma eval was negative, imaging neg for fracture or bleed. Had lactic acidosis and rhabdo, improving s/p fluids. Trop with mild elevation, no EKG new changes or clinical anginal equivalents at this time..     # Mechanical Fall  #posterior scalp contusion, largeescar   - no bleed or fractures identified, trauma evaluated and signed off  - lactic acidosis and elevated cpk noted, improving.   - no deficts, no mentation changes.   - denies chest pain or equivalents but trop with slight uptrend pending repeat.  Low concern for acs  - Tylenlol and oxycodone 5 mg for pain   - pt/ot eval, mod intesnity, is a 2 person assist. Today able to self reposition in bed.   - wound care cosult    #large posterior scalp unstagable pressure injury/eschar/hematoma  #SDH, 3mm read as subacute to chronic.   - wound care ordered  - ENT facial trauma consulted, appreciate recs. Have discussed with surgery and plastics and now reported ent is covering this issue.   - neurosurgery consult  - hold asa and loveneox  - s/p debridgement of eschar on head wound.  Cultures collected on 9/27.     #rhabdomyolysis   - CK elevated to 3,189, lactate 6.9 -> 2.9 s/p IVF   -Trend CK, still downtrending but still at 2.5k, will increase fluids to 200cc/hr. No resp complications at this time.     #troponin elevation  - mild uptrend  - pending repeat  - no angina or equivalents  - low concern for acs, likely related to demand    "  #HTN   #HLD  -On lovastatin, triamterene-hydroCHLOROthiazide 37.5-25 mg daily, and ASA 81mg   -C/w ASA 81 mg daily   -Holding statin iso c/f rhabo   -bp controlled  - plan to check orthostatics tomorrow.      #DM   -Check A1c: 6.5  -Holding home Metformin 500mg BID   -Start SSI 1:50 for glc >150     #Schizoaffective disorder  #MDD   #insomnia   -On propanolol 10 mg, Risperidone 2.5 mg tablet and trazodone 100 mg tablet   -C/w Risperidone 2.5 mg and Trazodone 100mg      #BPH  -C/w Tamsulosin      Scheduled outpatient appointments in system:   No future appointments.  ---------------------------------------------------------------------------------------------------  Subjective   No new events.  Patient denies new complaints.  Facial swelling is still improving.  Had debridement of scalp escar yesterday. Continues to have  headaches, no dizziness. Up to chair this am. Able to reposition independently in bed, feed him self. Moving independently without new neuro compaints. .     ---------------------------------------------------------------------------------------------------  Objective   Last Recorded Vitals  Blood pressure 177/90, pulse 80, temperature 36.9 °C (98.4 °F), temperature source Temporal, resp. rate 17, height 1.803 m (5' 11\"), weight 94.3 kg (208 lb), SpO2 98%.  Intake/Output last 3 Shifts:  I/O last 3 completed shifts:  In: 2760 (29.3 mL/kg) [I.V.:2760 (29.3 mL/kg)]  Out: 1040 (11 mL/kg) [Urine:1040 (0.3 mL/kg/hr)]  Weight: 94.3 kg     Physical Exam  Vitals and nursing note reviewed.   Constitutional:       General: He is not in acute distress.     Appearance: Normal appearance. He is not ill-appearing or toxic-appearing.   HENT:      Head: Normocephalic and atraumatic.      Comments: Posterior scalp contusion, wound covered with kerlix  Lip sweling noted, facial swelling improving, more symmetic, motor appears intact to left face and jaw now, likely was prev limited by swelling     Mouth/Throat:      " Mouth: Mucous membranes are moist.   Eyes:      General: No scleral icterus.     Extraocular Movements: Extraocular movements intact.      Conjunctiva/sclera: Conjunctivae normal.   Cardiovascular:      Rate and Rhythm: Normal rate and regular rhythm.      Heart sounds: S1 normal and S2 normal. No murmur heard.  Pulmonary:      Effort: Pulmonary effort is normal. No respiratory distress.      Breath sounds: No wheezing, rhonchi or rales.   Abdominal:      General: Bowel sounds are normal. There is no distension.      Palpations: Abdomen is soft.      Tenderness: There is no abdominal tenderness. There is no guarding or rebound.   Musculoskeletal:         General: No swelling or deformity.      Cervical back: Neck supple.   Skin:     General: Skin is warm and dry.      Findings: No rash.   Neurological:      Mental Status: He is alert and oriented to person, place, and time. Mental status is at baseline.      Comments: Face more symmetric, speech more clear, no gross deficits noted.    Psychiatric:         Mood and Affect: Mood normal.         Relevant Results  Lab Results   Component Value Date    WBC 3.5 (L) 09/27/2024    HGB 13.6 09/27/2024    HCT 41.7 09/27/2024    MCV 86 09/27/2024     09/27/2024      Lab Results   Component Value Date    GLUCOSE 126 (H) 09/27/2024    CALCIUM 8.9 09/27/2024     09/27/2024    K 3.7 09/27/2024    CO2 27 09/27/2024     09/27/2024    BUN 8 09/27/2024    CREATININE 0.79 09/27/2024     Scheduled medications  [Held by provider] aspirin, 81 mg, oral, Daily  [Held by provider] enoxaparin, 40 mg, subcutaneous, q24h  insulin lispro, 0-5 Units, subcutaneous, TID  iohexol, 150 mL, intravenous, Once in imaging  melatonin, 10 mg, oral, Nightly  polyethylene glycol, 17 g, oral, Daily  risperiDONE, 2.5 mg, oral, Daily  tamsulosin, 0.4 mg, oral, Daily  traZODone, 100 mg, oral, Nightly      Continuous medications  sodium chloride 0.9%, 200 mL/hr, Last Rate: 200 mL/hr (09/28/24  0230)      PRN medications  PRN medications: acetaminophen, dextrose, dextrose, glucagon, glucagon, oxyCODONE    Randy Patino MD

## 2024-09-29 ENCOUNTER — APPOINTMENT (OUTPATIENT)
Dept: RADIOLOGY | Facility: HOSPITAL | Age: 74
End: 2024-09-29
Payer: COMMERCIAL

## 2024-09-29 VITALS
RESPIRATION RATE: 16 BRPM | HEIGHT: 71 IN | BODY MASS INDEX: 29.12 KG/M2 | HEART RATE: 78 BPM | SYSTOLIC BLOOD PRESSURE: 157 MMHG | TEMPERATURE: 100.8 F | DIASTOLIC BLOOD PRESSURE: 84 MMHG | OXYGEN SATURATION: 94 % | WEIGHT: 208 LBS

## 2024-09-29 LAB
ALBUMIN SERPL BCP-MCNC: 3.3 G/DL (ref 3.4–5)
ALBUMIN SERPL BCP-MCNC: 3.6 G/DL (ref 3.4–5)
ALBUMIN SERPL BCP-MCNC: 3.6 G/DL (ref 3.4–5)
ALP SERPL-CCNC: 51 U/L (ref 33–136)
ALP SERPL-CCNC: 51 U/L (ref 33–136)
ALT SERPL W P-5'-P-CCNC: 32 U/L (ref 10–52)
ALT SERPL W P-5'-P-CCNC: 32 U/L (ref 10–52)
ANION GAP SERPL CALC-SCNC: 12 MMOL/L (ref 10–20)
ANION GAP SERPL CALC-SCNC: 12 MMOL/L (ref 10–20)
ANION GAP SERPL CALC-SCNC: 13 MMOL/L (ref 10–20)
ANION GAP SERPL CALC-SCNC: 13 MMOL/L (ref 10–20)
APTT PPP: 27 SECONDS (ref 27–38)
APTT PPP: 27 SECONDS (ref 27–38)
AST SERPL W P-5'-P-CCNC: 52 U/L (ref 9–39)
AST SERPL W P-5'-P-CCNC: 52 U/L (ref 9–39)
BACTERIA SPEC CULT: ABNORMAL
BASOPHILS # BLD AUTO: 0.03 X10*3/UL (ref 0–0.1)
BASOPHILS # BLD AUTO: 0.03 X10*3/UL (ref 0–0.1)
BASOPHILS NFR BLD AUTO: 0.7 %
BASOPHILS NFR BLD AUTO: 0.7 %
BILIRUB SERPL-MCNC: 0.8 MG/DL (ref 0–1.2)
BILIRUB SERPL-MCNC: 0.8 MG/DL (ref 0–1.2)
BUN SERPL-MCNC: 6 MG/DL (ref 6–23)
BUN SERPL-MCNC: 6 MG/DL (ref 6–23)
BUN SERPL-MCNC: 9 MG/DL (ref 6–23)
BUN SERPL-MCNC: 9 MG/DL (ref 6–23)
CALCIUM SERPL-MCNC: 8.4 MG/DL (ref 8.6–10.6)
CALCIUM SERPL-MCNC: 8.4 MG/DL (ref 8.6–10.6)
CALCIUM SERPL-MCNC: 8.7 MG/DL (ref 8.6–10.6)
CALCIUM SERPL-MCNC: 8.7 MG/DL (ref 8.6–10.6)
CHLORIDE SERPL-SCNC: 104 MMOL/L (ref 98–107)
CHLORIDE SERPL-SCNC: 104 MMOL/L (ref 98–107)
CHLORIDE SERPL-SCNC: 98 MMOL/L (ref 98–107)
CHLORIDE SERPL-SCNC: 98 MMOL/L (ref 98–107)
CK SERPL-CCNC: 1436 U/L (ref 0–325)
CK SERPL-CCNC: 1436 U/L (ref 0–325)
CO2 SERPL-SCNC: 23 MMOL/L (ref 21–32)
CREAT SERPL-MCNC: 0.88 MG/DL (ref 0.5–1.3)
CREAT SERPL-MCNC: 0.88 MG/DL (ref 0.5–1.3)
CREAT SERPL-MCNC: 0.89 MG/DL (ref 0.5–1.3)
CREAT SERPL-MCNC: 0.89 MG/DL (ref 0.5–1.3)
EGFRCR SERPLBLD CKD-EPI 2021: 90 ML/MIN/1.73M*2
EOSINOPHIL # BLD AUTO: 0.16 X10*3/UL (ref 0–0.4)
EOSINOPHIL # BLD AUTO: 0.16 X10*3/UL (ref 0–0.4)
EOSINOPHIL NFR BLD AUTO: 3.9 %
EOSINOPHIL NFR BLD AUTO: 3.9 %
ERYTHROCYTE [DISTWIDTH] IN BLOOD BY AUTOMATED COUNT: 13.1 % (ref 11.5–14.5)
ERYTHROCYTE [DISTWIDTH] IN BLOOD BY AUTOMATED COUNT: 13.1 % (ref 11.5–14.5)
ERYTHROCYTE [DISTWIDTH] IN BLOOD BY AUTOMATED COUNT: 13.4 % (ref 11.5–14.5)
ERYTHROCYTE [DISTWIDTH] IN BLOOD BY AUTOMATED COUNT: 13.4 % (ref 11.5–14.5)
GLUCOSE BLD MANUAL STRIP-MCNC: 118 MG/DL (ref 74–99)
GLUCOSE BLD MANUAL STRIP-MCNC: 118 MG/DL (ref 74–99)
GLUCOSE BLD MANUAL STRIP-MCNC: 135 MG/DL (ref 74–99)
GLUCOSE BLD MANUAL STRIP-MCNC: 135 MG/DL (ref 74–99)
GLUCOSE BLD MANUAL STRIP-MCNC: 162 MG/DL (ref 74–99)
GLUCOSE BLD MANUAL STRIP-MCNC: 162 MG/DL (ref 74–99)
GLUCOSE BLD MANUAL STRIP-MCNC: 212 MG/DL (ref 74–99)
GLUCOSE BLD MANUAL STRIP-MCNC: 212 MG/DL (ref 74–99)
GLUCOSE SERPL-MCNC: 105 MG/DL (ref 74–99)
GLUCOSE SERPL-MCNC: 105 MG/DL (ref 74–99)
GLUCOSE SERPL-MCNC: 137 MG/DL (ref 74–99)
GLUCOSE SERPL-MCNC: 137 MG/DL (ref 74–99)
GRAM STN SPEC: ABNORMAL
GRAM STN SPEC: ABNORMAL
HCT VFR BLD AUTO: 35.3 % (ref 41–52)
HCT VFR BLD AUTO: 35.3 % (ref 41–52)
HCT VFR BLD AUTO: 36 % (ref 41–52)
HCT VFR BLD AUTO: 36 % (ref 41–52)
HGB BLD-MCNC: 12 G/DL (ref 13.5–17.5)
HGB BLD-MCNC: 12 G/DL (ref 13.5–17.5)
HGB BLD-MCNC: 12.6 G/DL (ref 13.5–17.5)
HGB BLD-MCNC: 12.6 G/DL (ref 13.5–17.5)
IMM GRANULOCYTES # BLD AUTO: 0.01 X10*3/UL (ref 0–0.5)
IMM GRANULOCYTES # BLD AUTO: 0.01 X10*3/UL (ref 0–0.5)
IMM GRANULOCYTES NFR BLD AUTO: 0.2 % (ref 0–0.9)
IMM GRANULOCYTES NFR BLD AUTO: 0.2 % (ref 0–0.9)
INR PPP: 1.3 (ref 0.9–1.1)
INR PPP: 1.3 (ref 0.9–1.1)
LYMPHOCYTES # BLD AUTO: 1.08 X10*3/UL (ref 0.8–3)
LYMPHOCYTES # BLD AUTO: 1.08 X10*3/UL (ref 0.8–3)
LYMPHOCYTES NFR BLD AUTO: 26.2 %
LYMPHOCYTES NFR BLD AUTO: 26.2 %
MAGNESIUM SERPL-MCNC: 2.02 MG/DL (ref 1.6–2.4)
MAGNESIUM SERPL-MCNC: 2.02 MG/DL (ref 1.6–2.4)
MCH RBC QN AUTO: 28.1 PG (ref 26–34)
MCH RBC QN AUTO: 28.1 PG (ref 26–34)
MCH RBC QN AUTO: 28.5 PG (ref 26–34)
MCH RBC QN AUTO: 28.5 PG (ref 26–34)
MCHC RBC AUTO-ENTMCNC: 33.3 G/DL (ref 32–36)
MCHC RBC AUTO-ENTMCNC: 33.3 G/DL (ref 32–36)
MCHC RBC AUTO-ENTMCNC: 35.7 G/DL (ref 32–36)
MCHC RBC AUTO-ENTMCNC: 35.7 G/DL (ref 32–36)
MCV RBC AUTO: 79 FL (ref 80–100)
MCV RBC AUTO: 79 FL (ref 80–100)
MCV RBC AUTO: 86 FL (ref 80–100)
MCV RBC AUTO: 86 FL (ref 80–100)
MONOCYTES # BLD AUTO: 0.59 X10*3/UL (ref 0.05–0.8)
MONOCYTES # BLD AUTO: 0.59 X10*3/UL (ref 0.05–0.8)
MONOCYTES NFR BLD AUTO: 14.3 %
MONOCYTES NFR BLD AUTO: 14.3 %
NEUTROPHILS # BLD AUTO: 2.25 X10*3/UL (ref 1.6–5.5)
NEUTROPHILS # BLD AUTO: 2.25 X10*3/UL (ref 1.6–5.5)
NEUTROPHILS NFR BLD AUTO: 54.7 %
NEUTROPHILS NFR BLD AUTO: 54.7 %
NRBC BLD-RTO: 0 /100 WBCS (ref 0–0)
PHOSPHATE SERPL-MCNC: 3.2 MG/DL (ref 2.5–4.9)
PHOSPHATE SERPL-MCNC: 3.2 MG/DL (ref 2.5–4.9)
PLATELET # BLD AUTO: 188 X10*3/UL (ref 150–450)
PLATELET # BLD AUTO: 188 X10*3/UL (ref 150–450)
PLATELET # BLD AUTO: 190 X10*3/UL (ref 150–450)
PLATELET # BLD AUTO: 190 X10*3/UL (ref 150–450)
POTASSIUM SERPL-SCNC: 3.7 MMOL/L (ref 3.5–5.3)
POTASSIUM SERPL-SCNC: 3.7 MMOL/L (ref 3.5–5.3)
POTASSIUM SERPL-SCNC: 5.2 MMOL/L (ref 3.5–5.3)
POTASSIUM SERPL-SCNC: 5.2 MMOL/L (ref 3.5–5.3)
PREALB SERPL-MCNC: 16.3 MG/DL (ref 18–40)
PREALB SERPL-MCNC: 16.3 MG/DL (ref 18–40)
PROT SERPL-MCNC: 7.1 G/DL (ref 6.4–8.2)
PROT SERPL-MCNC: 7.1 G/DL (ref 6.4–8.2)
PROTHROMBIN TIME: 14.7 SECONDS (ref 9.8–12.8)
PROTHROMBIN TIME: 14.7 SECONDS (ref 9.8–12.8)
RBC # BLD AUTO: 4.21 X10*6/UL (ref 4.5–5.9)
RBC # BLD AUTO: 4.21 X10*6/UL (ref 4.5–5.9)
RBC # BLD AUTO: 4.48 X10*6/UL (ref 4.5–5.9)
RBC # BLD AUTO: 4.48 X10*6/UL (ref 4.5–5.9)
SODIUM SERPL-SCNC: 128 MMOL/L (ref 136–145)
SODIUM SERPL-SCNC: 128 MMOL/L (ref 136–145)
SODIUM SERPL-SCNC: 136 MMOL/L (ref 136–145)
SODIUM SERPL-SCNC: 136 MMOL/L (ref 136–145)
WBC # BLD AUTO: 4.1 X10*3/UL (ref 4.4–11.3)
WBC # BLD AUTO: 4.1 X10*3/UL (ref 4.4–11.3)
WBC # BLD AUTO: 4.5 X10*3/UL (ref 4.4–11.3)
WBC # BLD AUTO: 4.5 X10*3/UL (ref 4.4–11.3)

## 2024-09-29 PROCEDURE — 71045 X-RAY EXAM CHEST 1 VIEW: CPT | Performed by: RADIOLOGY

## 2024-09-29 PROCEDURE — 82040 ASSAY OF SERUM ALBUMIN: CPT | Performed by: STUDENT IN AN ORGANIZED HEALTH CARE EDUCATION/TRAINING PROGRAM

## 2024-09-29 PROCEDURE — 71045 X-RAY EXAM CHEST 1 VIEW: CPT

## 2024-09-29 PROCEDURE — 82550 ASSAY OF CK (CPK): CPT | Performed by: STUDENT IN AN ORGANIZED HEALTH CARE EDUCATION/TRAINING PROGRAM

## 2024-09-29 PROCEDURE — 2500000004 HC RX 250 GENERAL PHARMACY W/ HCPCS (ALT 636 FOR OP/ED)

## 2024-09-29 PROCEDURE — 86901 BLOOD TYPING SEROLOGIC RH(D): CPT | Performed by: STUDENT IN AN ORGANIZED HEALTH CARE EDUCATION/TRAINING PROGRAM

## 2024-09-29 PROCEDURE — 2500000001 HC RX 250 WO HCPCS SELF ADMINISTERED DRUGS (ALT 637 FOR MEDICARE OP): Performed by: STUDENT IN AN ORGANIZED HEALTH CARE EDUCATION/TRAINING PROGRAM

## 2024-09-29 PROCEDURE — 2500000002 HC RX 250 W HCPCS SELF ADMINISTERED DRUGS (ALT 637 FOR MEDICARE OP, ALT 636 FOR OP/ED)

## 2024-09-29 PROCEDURE — 83735 ASSAY OF MAGNESIUM: CPT

## 2024-09-29 PROCEDURE — 1200000002 HC GENERAL ROOM WITH TELEMETRY DAILY

## 2024-09-29 PROCEDURE — 99232 SBSQ HOSP IP/OBS MODERATE 35: CPT | Performed by: STUDENT IN AN ORGANIZED HEALTH CARE EDUCATION/TRAINING PROGRAM

## 2024-09-29 PROCEDURE — 85025 COMPLETE CBC W/AUTO DIFF WBC: CPT

## 2024-09-29 PROCEDURE — 36415 COLL VENOUS BLD VENIPUNCTURE: CPT

## 2024-09-29 PROCEDURE — 85610 PROTHROMBIN TIME: CPT | Performed by: STUDENT IN AN ORGANIZED HEALTH CARE EDUCATION/TRAINING PROGRAM

## 2024-09-29 PROCEDURE — 2500000001 HC RX 250 WO HCPCS SELF ADMINISTERED DRUGS (ALT 637 FOR MEDICARE OP)

## 2024-09-29 PROCEDURE — 36415 COLL VENOUS BLD VENIPUNCTURE: CPT | Performed by: STUDENT IN AN ORGANIZED HEALTH CARE EDUCATION/TRAINING PROGRAM

## 2024-09-29 PROCEDURE — 87081 CULTURE SCREEN ONLY: CPT

## 2024-09-29 PROCEDURE — 85027 COMPLETE CBC AUTOMATED: CPT | Performed by: STUDENT IN AN ORGANIZED HEALTH CARE EDUCATION/TRAINING PROGRAM

## 2024-09-29 PROCEDURE — 2500000004 HC RX 250 GENERAL PHARMACY W/ HCPCS (ALT 636 FOR OP/ED): Performed by: STUDENT IN AN ORGANIZED HEALTH CARE EDUCATION/TRAINING PROGRAM

## 2024-09-29 PROCEDURE — 87077 CULTURE AEROBIC IDENTIFY: CPT

## 2024-09-29 PROCEDURE — 99231 SBSQ HOSP IP/OBS SF/LOW 25: CPT

## 2024-09-29 PROCEDURE — 82947 ASSAY GLUCOSE BLOOD QUANT: CPT

## 2024-09-29 PROCEDURE — 80053 COMPREHEN METABOLIC PANEL: CPT

## 2024-09-29 PROCEDURE — 84100 ASSAY OF PHOSPHORUS: CPT

## 2024-09-29 PROCEDURE — 87186 SC STD MICRODIL/AGAR DIL: CPT

## 2024-09-29 PROCEDURE — 80069 RENAL FUNCTION PANEL: CPT

## 2024-09-29 PROCEDURE — 87040 BLOOD CULTURE FOR BACTERIA: CPT

## 2024-09-29 RX ORDER — VANCOMYCIN HYDROCHLORIDE 1 G/200ML
1000 INJECTION, SOLUTION INTRAVENOUS ONCE
Status: COMPLETED | OUTPATIENT
Start: 2024-09-29 | End: 2024-09-30

## 2024-09-29 RX ORDER — VANCOMYCIN HYDROCHLORIDE 1 G/200ML
1000 INJECTION, SOLUTION INTRAVENOUS ONCE
Status: DISCONTINUED | OUTPATIENT
Start: 2024-09-29 | End: 2024-09-29

## 2024-09-29 RX ORDER — VANCOMYCIN HYDROCHLORIDE 1 G/200ML
1000 INJECTION, SOLUTION INTRAVENOUS EVERY 12 HOURS
Status: DISCONTINUED | OUTPATIENT
Start: 2024-09-29 | End: 2024-09-29

## 2024-09-29 RX ORDER — VANCOMYCIN HYDROCHLORIDE 1 G/20ML
INJECTION, POWDER, LYOPHILIZED, FOR SOLUTION INTRAVENOUS DAILY PRN
Status: DISCONTINUED | OUTPATIENT
Start: 2024-09-29 | End: 2024-10-01

## 2024-09-29 ASSESSMENT — ACTIVITIES OF DAILY LIVING (ADL): LACK_OF_TRANSPORTATION: NO

## 2024-09-29 ASSESSMENT — PAIN SCALES - GENERAL
PAINLEVEL_OUTOF10: 6
PAINLEVEL_OUTOF10: 6

## 2024-09-29 ASSESSMENT — PAIN - FUNCTIONAL ASSESSMENT
PAIN_FUNCTIONAL_ASSESSMENT: WONG-BAKER FACES
PAIN_FUNCTIONAL_ASSESSMENT: 0-10
PAIN_FUNCTIONAL_ASSESSMENT: 0-10

## 2024-09-29 ASSESSMENT — PAIN DESCRIPTION - LOCATION: LOCATION: HEAD

## 2024-09-29 ASSESSMENT — PAIN SCALES - WONG BAKER: WONGBAKER_NUMERICALRESPONSE: NO HURT

## 2024-09-29 NOTE — PROGRESS NOTES
Assessment/Plan   Seven Robin is a 74 y.o. male with PMHx of HTN, HLD, DM, cirrhosis 2/2 treated Hep C (Tx [id number]), BPH, schizoaffective disorder and prostate CA s/p EBRT presenting to the ED s/p mechanical fall leading to trauma activation. Pt reports he was standing on a table when he fell backwards hitting his head last night. No proceeding symptoms, no b/b incontinence, or AMS post-event. Has a large postior scalp contusion. No new neuro deficits. Denies any other associated symptoms other than pain in his head and back. Trauma eval was negative, imaging neg for fracture or bleed. Had lactic acidosis and rhabdo, CPK with slow downtrend s/p fluids and continuing IVF. Trop with mild elevation, no EKG new changes or clinical anginal equivalents at this time. MRI obtained due to facial asymmetry, speech changes, vision change more likely related to facial trauma and swelling, showed 3mm SDH hematoma, now improving and do not appear to have been neuro deficits. Neurosurg consulted, appears to be seen on CT but not in report, stable. Holding asa and dvt px also in setting of scalp hematoma/pressure injury. Plastics and ent consulted, s/p debridement of scalp. Nutrition consulted for healing, continuing offloading and wound care per plastics.     # Mechanical Fall  #large posterior scalp unstagable pressure injury/eschar/hematoma  #SDH, 3mm read as subacute to chronic.   - no bleed or fractures identified, trauma evaluated and signed off  - ad lactic acidosis and rhabdo, CPK with slow downtrend s/p fluids and continuing IVF.   - MRI obtained due to facial asymmetry, speech changes, vision change more likely related to facial trauma and swelling, showed 3mm SDH hematoma, now improving and do not appear to have been neuro deficits  - denies chest pain or equivalents but trop with slight uptrend that later downtrended.  Low concern for acs  Neurosurg consulted, appears to be seen on CT but not in report, stable.  Holding asa and dvt px also in setting of scalp hematoma/pressure injury.    Plastics and ent consulted, s/p debridement of scalp. Nutrition consulted for healing, continuing offloading and wound care per plastics.   -Cultures collected on 9/27. Pending.   - nutrition consult to assist healing, prealbumin and albumin added on  - pt/ot eval, mod intesnity, is a 2 person assist. able to self reposition in bed.   - wound care consulted, plastics now consulted. Following plastics for recommendations for wound care.   - hold asa and loveneox    #rhabdomyolysis   - CK elevated to 3,189, lactate 6.9 -> 2.9 s/p IVF   -Trend CK, still downtrending but still at 2.5k,  increased fluids to 200cc/hr. No resp complications at this time.  CPK down to 1.4k, decreased fluids to 100cc/hr. Eval for dc of fluids tomorrow.     #troponin elevation  - mild uptrend initially then downtrend  - no angina or equivalents  - low concern for acs, likely related to demand     #HTN   #HLD  -On lovastatin, triamterene-hydroCHLOROthiazide 37.5-25 mg daily, and ASA 81mg   -C/w ASA 81 mg daily   -Holding statin iso c/f rhabo   -bp controlled       #DM   -Check A1c: 6.5  -Holding home Metformin 500mg BID   -Start SSI 1:50 for glc >150     #Schizoaffective disorder  #MDD   #insomnia   -On propanolol 10 mg, Risperidone 2.5 mg tablet and trazodone 100 mg tablet   -C/w Risperidone 2.5 mg and Trazodone 100mg      #BPH  -C/w Tamsulosin     Pt/ot rec mod intensity.      Scheduled outpatient appointments in system:   No future appointments.  ---------------------------------------------------------------------------------------------------  Subjective   No new events.  Patient denies new complaints.  No headache now, reports scalp is now sore. Facial swelling is still improving.  Mentation stable.     ---------------------------------------------------------------------------------------------------  Objective   Last Recorded Vitals  Blood pressure 144/69,  "pulse 65, temperature 36.5 °C (97.7 °F), temperature source Temporal, resp. rate 20, height 1.803 m (5' 11\"), weight 94.3 kg (208 lb), SpO2 98%.  Intake/Output last 3 Shifts:  I/O last 3 completed shifts:  In: 1030 (10.9 mL/kg) [P.O.:30; I.V.:1000 (10.6 mL/kg)]  Out: 2500 (26.5 mL/kg) [Urine:2500 (0.7 mL/kg/hr)]  Weight: 94.3 kg     Physical Exam  Vitals and nursing note reviewed.   Constitutional:       General: He is not in acute distress.     Appearance: Normal appearance. He is not ill-appearing or toxic-appearing.   HENT:      Head: Normocephalic and atraumatic.      Comments: Posterior scalp contusion, wound covered and wrapped with kerlix  Lip sweling noted, facial swelling improving, more symmetic, motor appears intact to left face and jaw now, likely was prev limited by swelling     Mouth/Throat:      Mouth: Mucous membranes are moist.   Eyes:      General: No scleral icterus.     Extraocular Movements: Extraocular movements intact.      Conjunctiva/sclera: Conjunctivae normal.   Cardiovascular:      Rate and Rhythm: Normal rate and regular rhythm.      Heart sounds: S1 normal and S2 normal. No murmur heard.  Pulmonary:      Effort: Pulmonary effort is normal. No respiratory distress.      Breath sounds: No wheezing, rhonchi or rales.   Abdominal:      General: Bowel sounds are normal. There is no distension.      Palpations: Abdomen is soft.      Tenderness: There is no abdominal tenderness. There is no guarding or rebound.   Musculoskeletal:         General: No swelling or deformity.      Cervical back: Neck supple.   Skin:     General: Skin is warm and dry.      Findings: No rash.   Neurological:      Mental Status: He is alert and oriented to person, place, and time. Mental status is at baseline.      Comments: Face more symmetric, speech more clear, no gross deficits noted.    Psychiatric:         Mood and Affect: Mood normal.         Relevant Results  Lab Results   Component Value Date    WBC 3.9 (L) " 09/28/2024    HGB 12.0 (L) 09/28/2024    HCT 37.7 (L) 09/28/2024    MCV 88 09/28/2024     (L) 09/28/2024      Lab Results   Component Value Date    GLUCOSE 115 (H) 09/28/2024    CALCIUM 8.4 (L) 09/28/2024     (L) 09/28/2024    K 3.6 09/28/2024    CO2 24 09/28/2024     09/28/2024    BUN 6 09/28/2024    CREATININE 0.77 09/28/2024     Scheduled medications  [Held by provider] aspirin, 81 mg, oral, Daily  [Held by provider] enoxaparin, 40 mg, subcutaneous, q24h  insulin lispro, 0-5 Units, subcutaneous, TID  iohexol, 150 mL, intravenous, Once in imaging  losartan, 25 mg, oral, Daily  melatonin, 10 mg, oral, Nightly  polyethylene glycol, 17 g, oral, Daily  risperiDONE, 2.5 mg, oral, Daily  tamsulosin, 0.4 mg, oral, Daily  traZODone, 100 mg, oral, Nightly      Continuous medications  sodium chloride 0.9%, 200 mL/hr, Last Rate: 200 mL/hr (09/28/24 2141)      PRN medications  PRN medications: acetaminophen, dextrose, dextrose, glucagon, glucagon    Randy Patino MD

## 2024-09-29 NOTE — PROGRESS NOTES
"  Department of Plastic and Reconstructive Surgery  Daily Progress Note    Patient Name: Seven Robin  MRN: [id number]  Date:  09/29/24     Subjective  Patient found resting in bed this morning, offloading pressure from occipital wound. States his pain is controlled. Wound dressed with xeroform at time of exam. Denies any fever, chills, night sweats, CP, SOB, palpitations, nausea, vomiting, or abdominal pain/discomfort.      Objective    Vital Signs  /71 (BP Location: Right arm, Patient Position: Lying)   Pulse 58   Temp 36.2 °C (97.2 °F) (Temporal)   Resp 14   Ht 1.803 m (5' 11\")   Wt 94.3 kg (208 lb)   SpO2 98%   BMI 29.01 kg/m²      Physical Exam   Constitutional: A&Ox3, calm and cooperative, NAD.  Eyes: PERRL, EOMI  ENMT: Moist mucous membranes, no apparent injuries or lesions. Small lacerations on left lower lip and left side of chin, appear to be healing, scabbing on chin wounds, mild lower lip edema.  Head/Neck: NC/AT. Scalp has large wound on superior posterior aspect. 15X 7 cm. Intermittent black eschar on wound bed, improved from previous assessment. Scant bloody drainage present. Dressed with xeroform.  Cardiovascular: Normal rate and regular rhythm. 2+ equal pulses of the distal extremities.  Respiratory/Thorax: CTAB, regular respirations on RA. Good symmetric chest expansion.   Gastrointestinal: Abdomen soft, NTND, +BS x4, +BM  Genitourinary: voiding independently via urinal, clear yellow output  Extremities: No peripheral edema. Generalized weakness, 1+ edema to BLE and BUE, abrasions to left elbow and shoulder, knees  Neurological: A&Ox3.   Psychological: Appropriate mood and behavior.    Diagnostics   Results for orders placed or performed during the hospital encounter of 09/24/24 (from the past 24 hour(s))   POCT GLUCOSE   Result Value Ref Range    POCT Glucose 299 (H) 74 - 99 mg/dL   POCT GLUCOSE   Result Value Ref Range    POCT Glucose 135 (H) 74 - 99 mg/dL   POCT GLUCOSE   Result " Value Ref Range    POCT Glucose 118 (H) 74 - 99 mg/dL   CBC and Auto Differential   Result Value Ref Range    WBC 4.1 (L) 4.4 - 11.3 x10*3/uL    nRBC 0.0 0.0 - 0.0 /100 WBCs    RBC 4.21 (L) 4.50 - 5.90 x10*6/uL    Hemoglobin 12.0 (L) 13.5 - 17.5 g/dL    Hematocrit 36.0 (L) 41.0 - 52.0 %    MCV 86 80 - 100 fL    MCH 28.5 26.0 - 34.0 pg    MCHC 33.3 32.0 - 36.0 g/dL    RDW 13.4 11.5 - 14.5 %    Platelets 188 150 - 450 x10*3/uL    Neutrophils % 54.7 40.0 - 80.0 %    Immature Granulocytes %, Automated 0.2 0.0 - 0.9 %    Lymphocytes % 26.2 13.0 - 44.0 %    Monocytes % 14.3 2.0 - 10.0 %    Eosinophils % 3.9 0.0 - 6.0 %    Basophils % 0.7 0.0 - 2.0 %    Neutrophils Absolute 2.25 1.60 - 5.50 x10*3/uL    Immature Granulocytes Absolute, Automated 0.01 0.00 - 0.50 x10*3/uL    Lymphocytes Absolute 1.08 0.80 - 3.00 x10*3/uL    Monocytes Absolute 0.59 0.05 - 0.80 x10*3/uL    Eosinophils Absolute 0.16 0.00 - 0.40 x10*3/uL    Basophils Absolute 0.03 0.00 - 0.10 x10*3/uL   Renal Function Panel   Result Value Ref Range    Glucose 105 (H) 74 - 99 mg/dL    Sodium 136 136 - 145 mmol/L    Potassium 3.7 3.5 - 5.3 mmol/L    Chloride 104 98 - 107 mmol/L    Bicarbonate 23 21 - 32 mmol/L    Anion Gap 13 10 - 20 mmol/L    Urea Nitrogen 6 6 - 23 mg/dL    Creatinine 0.89 0.50 - 1.30 mg/dL    eGFR 90 >60 mL/min/1.73m*2    Calcium 8.7 8.6 - 10.6 mg/dL    Phosphorus 3.2 2.5 - 4.9 mg/dL    Albumin 3.3 (L) 3.4 - 5.0 g/dL   Magnesium   Result Value Ref Range    Magnesium 2.02 1.60 - 2.40 mg/dL   Creatine Kinase   Result Value Ref Range    Creatine Kinase 1,436 (H) 0 - 325 U/L   Albumin   Result Value Ref Range    Albumin 3.3 (L) 3.4 - 5.0 g/dL   POCT GLUCOSE   Result Value Ref Range    POCT Glucose 162 (H) 74 - 99 mg/dL     MR brain wo IV contrast    Result Date: 9/27/2024  Interpreted By:  Giovanni Valerio  and Karolyn Conner STUDY: MR BRAIN WO IV CONTRAST;  9/26/2024 8:00 pm   INDICATION: Signs/Symptoms:left facial drop after fall with  significant head injury, >24 hrs. pt reports longstanding v. since intial injury.   COMPARISON: CT head 09/24/2024   ACCESSION NUMBER(S): [id number]   ORDERING CLINICIAN: YOU FRANCOIS   TECHNIQUE: Axial T2, FLAIR, DWI, gradient echo T2 and sagittal and coronal T1 weighted images of the brain were acquired.   FINDINGS: There is no diffusion restriction to suggest acute infarction.   There is subtle FLAIR hyperintense, T1 isointense subdural collection present along the medial aspect of the right occipital lobe (series 3, image 20) with blooming artifact present in the dark gradient echo images (series 6, image 20) with asymmetric T1 isointense thickening of the right tentorium, suggestive of layering acute subdural hematoma between the right occipital lobe and the tentorium (series 7, image 34)   There is also a T2 hyperintense, T1 isointense extra-axial collection overlying the right frontal region measuring up to 3 mm in maximal thickness, essentially unchanged in appearance to prior CT head. There may be slight mass effect on the adjacent brain parenchyma, with a 2-3 mm rightward shift of midline structures at the septum pellucidum.   The ventricles and sulci prominent likely due to diffuse parenchymal volume loss. No new ventricular dilatation is identified. Basal cisterns are patent.   There are extensive periventricular and subcortical T2/FLAIR hyperintensities likely represent sequela of small-vessel ischemic changes.   Fluid and edema are again present in the scalp soft tissues bilaterally, perhaps slightly improved compared to prior exam on 09/24/2024.   Visualized large arterial flow voids, including intracranial carotid and basilar artery are preserved.   No abnormal fluid signal is present in the paranasal sinuses or mastoid air cells.       1. No diffusion restriction abnormality is present to suggest an acute infarct. 2. A FLAIR hyperintense, T1 isointense subdural hematoma is layering between  the right occipital lobe in the tentorium, measuring between 2-3 mm in thickness, with minimal mass effect on the adjacent brain parenchyma, new/increased in size from prior CT head dated 09/24/2024. 3. A subtle T2 hyperintense collection overlies the right frontal lobe, measuring up to 3 mm in maximal thickness, with minimal if any mass effect on the adjacent brain parenchyma, and a 2-3 mm leftward shift of midline structures. Collection demonstrates no associated FLAIR hyperintensity or blooming on gradient echo and may represent a more chronic hygroma. 4. Moderate diffuse brain parenchymal volume loss with patchy and confluence areas of hyperintense FLAIR and T2 signal are present in the periventricular and subcortical white matter of bilateral cerebral hemispheres, nonspecific findings favored to represent sequela of microvascular disease. 5. Scalp swelling and edema slightly improved from prior CT dated 09/24/2024.   I personally reviewed the images/study and I agree with the findings as stated by resident physician Dr. Ubaldo Cortes . This study was interpreted at [facility], [place], [place].   MACRO: None   Signed by: Giovanni Valerio 9/27/2024 1:34 AM Dictation workstation:   [id number]      Current Medications  Scheduled medications  [Held by provider] aspirin, 81 mg, oral, Daily  [Held by provider] enoxaparin, 40 mg, subcutaneous, q24h  insulin lispro, 0-5 Units, subcutaneous, TID  iohexol, 150 mL, intravenous, Once in imaging  losartan, 25 mg, oral, Daily  melatonin, 10 mg, oral, Nightly  polyethylene glycol, 17 g, oral, Daily  risperiDONE, 2.5 mg, oral, Daily  tamsulosin, 0.4 mg, oral, Daily  traZODone, 100 mg, oral, Nightly      Continuous medications  sodium chloride 0.9%, 100 mL/hr, Last Rate: 100 mL/hr (09/29/24 1143)      PRN medications  PRN medications: acetaminophen, dextrose, dextrose, glucagon, glucagon     Assessment  Seven Robin is a 74 y.o.  male who presented to [facility] on 9/24/2024 as a full trauma activation for mechanical fall with head strike and deformity of the skull after falling from table and remaining on the ground for approximately 12 hours. Reports he was reaching for something and was standing on a table when he fell backwards. His roommate found him in the morning. Reports no loss of consciousness and can recall the fall.  Extensive imaging in the ED with no acute findings aside from scalp wound.  He has remained alert and oriented x 3 with no focal deficits for the duration of his hospitalization.  Had lactic acidosis and rhabdo, improving s/p fluids. Occipital wound with necrosis, wound care was consulted and recommended daily saline irrigation and xeroform dressing with Mepilex border dressing. Plastic Surgery was consulted for management of occipital wound.     Plan/Recommendations  S/p Occipital trauma with hematoma/Unstageable pressure ulcer to occipital area  - No acute surgical intervention from plastic surgery perspective at this time  - Dressing removed on exam today and xeroform replaced over wound  - Bedside debridement performed 9/27, see procedure note for additional details  - Appreciate wound care recs, currently dressing with xeroform  - Follow wound culture       · 1+ gram positive cocci (preliminary)  - Offload pressure off of occipital wound  - Recommend nutrition optimization for wound healing, albumin and prealbumin monitoring, nutrition supplements Q meal  - Recommend tight glycemic control  - Plastics will continue to follow and monitor wound progress  - Remainder of care per primary team    Patient and plan discussed with Dr. Danielle.     Pam Mason PA-C   Plastic and Reconstructive Surgery   [place]  Pager #[phone]  Team phone: [phone]

## 2024-09-29 NOTE — DOCUMENTATION CLARIFICATION NOTE
"    [facility]               [facility] [place]  [id number]  MRN:                       [id number]  :                       1950  ADMIT DATE:       2024 10:27 AM  DISCH DATE:  RESPONDING PROVIDER #:        [id number]          PROVIDER RESPONSE TEXT:    Traumatic subdural hematoma with loss of consciousness is clinically significant and required treatment/monitoring    CDI QUERY TEXT:    Clarification    Instruction:    Based on your assessment of the patient and the clinical information, please provide the requested documentation by clicking on the appropriate radio button and enter any additional information if prompted.    Question: Is there a diagnosis indicative of the lab values or image study    When answering this query, please exercise your independent professional judgment. The fact that a question is being asked, does not imply that any particular answer is desired or expected.    The patient's clinical indicators include:  Clinical Information:  Patient is a 74 year old male who presented as a trauma after he was found down twelve hours after a fall from a table.    Clinical Indicators:  From the MRI of the brain on , \" A FLAIR hyperintense, T1 isointense subdural hematoma is layering between the right occipital lobe in the tentorium, measuring between 2-3 mm in thickness, with minimal mass effect on the adjacent brain parenchyma, new/increased in size from prior CT head dated 2024 \"    Treatment:  CT of head, MRI of brain, neuro checks every shift with assessment, frequent vital signs    Risk Factors:  Fall, was found down after 12 hours, HTN, HLD, visual disturbances  Options provided:  -- Traumatic subdural hematoma with loss of consciousness is clinically significant and required treatment/monitoring  -- Traumatic subdural hematoma without loss of consciousness is clinically significant and required treatment/monitoring  -- Traumatic subdural hematoma with loss of " consciousness status unknown is clinically significant and required treatment/monitoring  -- Subdural hematoma is not significant and does not require treatment/monitoring  -- Other - I will add my own diagnosis  -- Refer to Clinical Documentation Reviewer    Query created by: Janine Tenorio on 9/27/2024 8:29 AM      Electronically signed by:  YOU FRANCOIS MD 9/29/2024 4:50 PM

## 2024-09-29 NOTE — CARE PLAN
The patient's goals for the shift include wound care and pain management.    Problem: Pain - Adult  Goal: Verbalizes/displays adequate comfort level or baseline comfort level  Outcome: Progressing     Problem: Safety - Adult  Goal: Free from fall injury  Outcome: Progressing     Problem: Discharge Planning  Goal: Discharge to home or other facility with appropriate resources  Outcome: Progressing     Problem: Chronic Conditions and Co-morbidities  Goal: Patient's chronic conditions and co-morbidity symptoms are monitored and maintained or improved  Outcome: Progressing     Problem: Pain  Goal: Takes deep breaths with improved pain control throughout the shift  Outcome: Progressing  Goal: Turns in bed with improved pain control throughout the shift  Outcome: Progressing  Goal: Walks with improved pain control throughout the shift  Outcome: Progressing  Goal: Performs ADL's with improved pain control throughout shift  Outcome: Progressing  Goal: Participates in PT with improved pain control throughout the shift  Outcome: Progressing  Goal: Free from opioid side effects throughout the shift  Outcome: Progressing  Goal: Free from acute confusion related to pain meds throughout the shift  Outcome: Progressing

## 2024-09-29 NOTE — PROGRESS NOTES
09/29/24 1006   Discharge Planning   Living Arrangements Family members  (Home with cousin Randy.)   Support Systems Family members   Assistance Needed Pt will discharge to SNF.   Type of Residence Private residence  (Multi-story home.)   Do you have animals or pets at home? No   Who is requesting discharge planning? Provider   Home or Post Acute Services Post acute facilities (Rehab/SNF/etc)   Type of Post Acute Facility Services Skilled nursing   Expected Discharge Disposition SNF   Does the patient need discharge transport arranged? Yes   RoundTrip coordination needed? Yes   Has discharge transport been arranged? No   Financial Resource Strain   How hard is it for you to pay for the very basics like food, housing, medical care, and heating? Not hard   Housing Stability   In the last 12 months, was there a time when you were not able to pay the mortgage or rent on time? N   At any time in the past 12 months, were you homeless or living in a shelter (including now)? N   Transportation Needs   In the past 12 months, has lack of transportation kept you from medical appointments or from getting medications? no   In the past 12 months, has lack of transportation kept you from meetings, work, or from getting things needed for daily living? No     Assessment Note:  Met with pt and introduced myself as care coordinator and member of the Care Transitions team for discharge planning.  Pt lives with a roommate Randy.  Pt states he was independent at home. Pt's cousin Avery comes over daily to assist with preparation of meals, cleaning and laundry.  Pt's address, phone number and contact information was verified.  PT/OT are recommending moderate intensity, pt is agreeable.  Pt states it is ok to work with his brother.  Left a message for pt's brother Danny Falcon ([phone]) to verify assessment information and offer SNF preferences, left a voicemail.  Await a return phone call.  Pt does not have any other  "questions/concerns at this time.     Previous Home Care: None  DME: Pt has a cane (not using).  Pharmacy: [facility] and [place].  Falls: Prior to admission, pt states he climbed on a table to change a circuit breaker (lights went out), and had a fall.  PCP: Dr. Portillo Jimenez (pt has an upcoming appt on 9/30).  [facility]    Per Dr. Patino, ELISE 10/2. Await wound care recommendations from plastics and a nutrition assessment.     Addendum 1218:  Received a call back from pt's brother Danny and able to verify assessment information. Per brother, pt drives to his home and drs appts.  Brother states pt has been going down for the last several years (\"has been through a lot\").  He is agreeable to review the SNF list.  List was emailed to [email or web address]  Requested 5-6 choices.      Addendum 1431:  Received a call back from pt's brother who provided the following SNF preferences: [facility] (pt's [facility]), [facility] and [facility].  Referrals sent via [facility], await acceptances.    Angella Beckman MSN, RN-BC  Transitional Care Coordinator (TCC)  [phone]   "

## 2024-09-30 ENCOUNTER — ANESTHESIA EVENT (OUTPATIENT)
Dept: OPERATING ROOM | Facility: HOSPITAL | Age: 74
End: 2024-09-30
Payer: COMMERCIAL

## 2024-09-30 PROBLEM — I10 HTN (HYPERTENSION): Status: ACTIVE | Noted: 2024-09-30

## 2024-09-30 PROBLEM — E78.5 HYPERLIPIDEMIA: Status: ACTIVE | Noted: 2024-09-30

## 2024-09-30 PROBLEM — K74.60 CIRRHOSIS (MULTI): Status: ACTIVE | Noted: 2024-09-30

## 2024-09-30 PROBLEM — S08.0XXA AVULSION OF SCALP: Status: ACTIVE | Noted: 2024-09-24

## 2024-09-30 PROBLEM — E11.9 DIABETES MELLITUS, TYPE 2 (MULTI): Status: ACTIVE | Noted: 2024-09-30

## 2024-09-30 LAB
ABO GROUP (TYPE) IN BLOOD: NORMAL
ABO GROUP (TYPE) IN BLOOD: NORMAL
ALBUMIN SERPL BCP-MCNC: 3.3 G/DL (ref 3.4–5)
ALBUMIN SERPL BCP-MCNC: 3.3 G/DL (ref 3.4–5)
ANION GAP BLDV CALCULATED.4IONS-SCNC: 8 MMOL/L (ref 10–25)
ANION GAP BLDV CALCULATED.4IONS-SCNC: 8 MMOL/L (ref 10–25)
ANION GAP SERPL CALC-SCNC: 13 MMOL/L (ref 10–20)
ANION GAP SERPL CALC-SCNC: 13 MMOL/L (ref 10–20)
ANTIBODY SCREEN: NORMAL
ANTIBODY SCREEN: NORMAL
APPEARANCE UR: CLEAR
APPEARANCE UR: CLEAR
BACTERIA SPEC CULT: ABNORMAL
BACTERIA SPEC CULT: ABNORMAL
BASE EXCESS BLDV CALC-SCNC: 1.7 MMOL/L (ref -2–3)
BASE EXCESS BLDV CALC-SCNC: 1.7 MMOL/L (ref -2–3)
BASOPHILS # BLD AUTO: 0.02 X10*3/UL (ref 0–0.1)
BASOPHILS # BLD AUTO: 0.02 X10*3/UL (ref 0–0.1)
BASOPHILS NFR BLD AUTO: 0.4 %
BASOPHILS NFR BLD AUTO: 0.4 %
BILIRUB UR STRIP.AUTO-MCNC: NEGATIVE MG/DL
BILIRUB UR STRIP.AUTO-MCNC: NEGATIVE MG/DL
BODY TEMPERATURE: 37 DEGREES CELSIUS
BODY TEMPERATURE: 37 DEGREES CELSIUS
BUN SERPL-MCNC: 8 MG/DL (ref 6–23)
BUN SERPL-MCNC: 8 MG/DL (ref 6–23)
CA-I BLDV-SCNC: 1.19 MMOL/L (ref 1.1–1.33)
CA-I BLDV-SCNC: 1.19 MMOL/L (ref 1.1–1.33)
CALCIUM SERPL-MCNC: 8.8 MG/DL (ref 8.6–10.6)
CALCIUM SERPL-MCNC: 8.8 MG/DL (ref 8.6–10.6)
CHLORIDE BLDV-SCNC: 99 MMOL/L (ref 98–107)
CHLORIDE BLDV-SCNC: 99 MMOL/L (ref 98–107)
CHLORIDE SERPL-SCNC: 100 MMOL/L (ref 98–107)
CHLORIDE SERPL-SCNC: 100 MMOL/L (ref 98–107)
CHLORIDE UR-SCNC: 98 MMOL/L
CHLORIDE UR-SCNC: 98 MMOL/L
CHLORIDE/CREATININE (MMOL/G) IN URINE: 89 MMOL/G CREAT (ref 23–275)
CHLORIDE/CREATININE (MMOL/G) IN URINE: 89 MMOL/G CREAT (ref 23–275)
CK SERPL-CCNC: 750 U/L (ref 0–325)
CK SERPL-CCNC: 750 U/L (ref 0–325)
CO2 SERPL-SCNC: 24 MMOL/L (ref 21–32)
CO2 SERPL-SCNC: 24 MMOL/L (ref 21–32)
COLOR UR: NORMAL
COLOR UR: NORMAL
CREAT SERPL-MCNC: 0.94 MG/DL (ref 0.5–1.3)
CREAT SERPL-MCNC: 0.94 MG/DL (ref 0.5–1.3)
CREAT UR-MCNC: 110.4 MG/DL (ref 20–370)
CREAT UR-MCNC: 110.4 MG/DL (ref 20–370)
EGFRCR SERPLBLD CKD-EPI 2021: 85 ML/MIN/1.73M*2
EGFRCR SERPLBLD CKD-EPI 2021: 85 ML/MIN/1.73M*2
EOSINOPHIL # BLD AUTO: 0.07 X10*3/UL (ref 0–0.4)
EOSINOPHIL # BLD AUTO: 0.07 X10*3/UL (ref 0–0.4)
EOSINOPHIL NFR BLD AUTO: 1.5 %
EOSINOPHIL NFR BLD AUTO: 1.5 %
ERYTHROCYTE [DISTWIDTH] IN BLOOD BY AUTOMATED COUNT: 13.7 % (ref 11.5–14.5)
ERYTHROCYTE [DISTWIDTH] IN BLOOD BY AUTOMATED COUNT: 13.7 % (ref 11.5–14.5)
GLUCOSE BLD MANUAL STRIP-MCNC: 133 MG/DL (ref 74–99)
GLUCOSE BLD MANUAL STRIP-MCNC: 133 MG/DL (ref 74–99)
GLUCOSE BLD MANUAL STRIP-MCNC: 142 MG/DL (ref 74–99)
GLUCOSE BLD MANUAL STRIP-MCNC: 146 MG/DL (ref 74–99)
GLUCOSE BLD MANUAL STRIP-MCNC: 146 MG/DL (ref 74–99)
GLUCOSE BLDV-MCNC: 161 MG/DL (ref 74–99)
GLUCOSE BLDV-MCNC: 161 MG/DL (ref 74–99)
GLUCOSE SERPL-MCNC: 110 MG/DL (ref 74–99)
GLUCOSE SERPL-MCNC: 110 MG/DL (ref 74–99)
GLUCOSE UR STRIP.AUTO-MCNC: NORMAL MG/DL
GLUCOSE UR STRIP.AUTO-MCNC: NORMAL MG/DL
GRAM STN SPEC: ABNORMAL
HCO3 BLDV-SCNC: 27.2 MMOL/L (ref 22–26)
HCO3 BLDV-SCNC: 27.2 MMOL/L (ref 22–26)
HCT VFR BLD AUTO: 38.3 % (ref 41–52)
HCT VFR BLD AUTO: 38.3 % (ref 41–52)
HCT VFR BLD EST: 46 % (ref 41–52)
HCT VFR BLD EST: 46 % (ref 41–52)
HGB BLD-MCNC: 12.3 G/DL (ref 13.5–17.5)
HGB BLD-MCNC: 12.3 G/DL (ref 13.5–17.5)
HGB BLDV-MCNC: 15.2 G/DL (ref 13.5–17.5)
HGB BLDV-MCNC: 15.2 G/DL (ref 13.5–17.5)
IMM GRANULOCYTES # BLD AUTO: 0.03 X10*3/UL (ref 0–0.5)
IMM GRANULOCYTES # BLD AUTO: 0.03 X10*3/UL (ref 0–0.5)
IMM GRANULOCYTES NFR BLD AUTO: 0.6 % (ref 0–0.9)
IMM GRANULOCYTES NFR BLD AUTO: 0.6 % (ref 0–0.9)
INHALED O2 CONCENTRATION: 21 %
INHALED O2 CONCENTRATION: 21 %
KETONES UR STRIP.AUTO-MCNC: NEGATIVE MG/DL
KETONES UR STRIP.AUTO-MCNC: NEGATIVE MG/DL
LACTATE BLDV-SCNC: 1.4 MMOL/L (ref 0.4–2)
LACTATE BLDV-SCNC: 1.4 MMOL/L (ref 0.4–2)
LEUKOCYTE ESTERASE UR QL STRIP.AUTO: NEGATIVE
LEUKOCYTE ESTERASE UR QL STRIP.AUTO: NEGATIVE
LYMPHOCYTES # BLD AUTO: 0.9 X10*3/UL (ref 0.8–3)
LYMPHOCYTES # BLD AUTO: 0.9 X10*3/UL (ref 0.8–3)
LYMPHOCYTES NFR BLD AUTO: 19.1 %
LYMPHOCYTES NFR BLD AUTO: 19.1 %
MAGNESIUM SERPL-MCNC: 1.96 MG/DL (ref 1.6–2.4)
MAGNESIUM SERPL-MCNC: 1.96 MG/DL (ref 1.6–2.4)
MCH RBC QN AUTO: 27.8 PG (ref 26–34)
MCH RBC QN AUTO: 27.8 PG (ref 26–34)
MCHC RBC AUTO-ENTMCNC: 32.1 G/DL (ref 32–36)
MCHC RBC AUTO-ENTMCNC: 32.1 G/DL (ref 32–36)
MCV RBC AUTO: 87 FL (ref 80–100)
MCV RBC AUTO: 87 FL (ref 80–100)
MONOCYTES # BLD AUTO: 0.62 X10*3/UL (ref 0.05–0.8)
MONOCYTES # BLD AUTO: 0.62 X10*3/UL (ref 0.05–0.8)
MONOCYTES NFR BLD AUTO: 13.2 %
MONOCYTES NFR BLD AUTO: 13.2 %
NEUTROPHILS # BLD AUTO: 3.06 X10*3/UL (ref 1.6–5.5)
NEUTROPHILS # BLD AUTO: 3.06 X10*3/UL (ref 1.6–5.5)
NEUTROPHILS NFR BLD AUTO: 65.2 %
NEUTROPHILS NFR BLD AUTO: 65.2 %
NITRITE UR QL STRIP.AUTO: NEGATIVE
NITRITE UR QL STRIP.AUTO: NEGATIVE
NRBC BLD-RTO: 0 /100 WBCS (ref 0–0)
NRBC BLD-RTO: 0 /100 WBCS (ref 0–0)
OSMOLALITY SERPL: 270 MOSM/KG (ref 280–300)
OSMOLALITY SERPL: 270 MOSM/KG (ref 280–300)
OSMOLALITY UR: 519 MOSM/KG (ref 200–1200)
OSMOLALITY UR: 519 MOSM/KG (ref 200–1200)
OXYHGB MFR BLDV: 59.7 % (ref 45–75)
OXYHGB MFR BLDV: 59.7 % (ref 45–75)
PCO2 BLDV: 45 MM HG (ref 41–51)
PCO2 BLDV: 45 MM HG (ref 41–51)
PH BLDV: 7.39 PH (ref 7.33–7.43)
PH BLDV: 7.39 PH (ref 7.33–7.43)
PH UR STRIP.AUTO: 6.5 [PH]
PH UR STRIP.AUTO: 6.5 [PH]
PHOSPHATE SERPL-MCNC: 3.4 MG/DL (ref 2.5–4.9)
PHOSPHATE SERPL-MCNC: 3.4 MG/DL (ref 2.5–4.9)
PLATELET # BLD AUTO: 186 X10*3/UL (ref 150–450)
PLATELET # BLD AUTO: 186 X10*3/UL (ref 150–450)
PO2 BLDV: 43 MM HG (ref 35–45)
PO2 BLDV: 43 MM HG (ref 35–45)
POTASSIUM BLDV-SCNC: 3.5 MMOL/L (ref 3.5–5.3)
POTASSIUM BLDV-SCNC: 3.5 MMOL/L (ref 3.5–5.3)
POTASSIUM SERPL-SCNC: 3.7 MMOL/L (ref 3.5–5.3)
POTASSIUM SERPL-SCNC: 3.7 MMOL/L (ref 3.5–5.3)
POTASSIUM UR-SCNC: 30 MMOL/L
POTASSIUM UR-SCNC: 30 MMOL/L
POTASSIUM/CREAT UR-RTO: 27 MMOL/G CREAT
POTASSIUM/CREAT UR-RTO: 27 MMOL/G CREAT
PROT UR STRIP.AUTO-MCNC: NEGATIVE MG/DL
PROT UR STRIP.AUTO-MCNC: NEGATIVE MG/DL
RBC # BLD AUTO: 4.43 X10*6/UL (ref 4.5–5.9)
RBC # BLD AUTO: 4.43 X10*6/UL (ref 4.5–5.9)
RBC # UR STRIP.AUTO: NEGATIVE /UL
RBC # UR STRIP.AUTO: NEGATIVE /UL
RH FACTOR (ANTIGEN D): NORMAL
RH FACTOR (ANTIGEN D): NORMAL
SAO2 % BLDV: 61 % (ref 45–75)
SAO2 % BLDV: 61 % (ref 45–75)
SODIUM BLDV-SCNC: 131 MMOL/L (ref 136–145)
SODIUM BLDV-SCNC: 131 MMOL/L (ref 136–145)
SODIUM SERPL-SCNC: 133 MMOL/L (ref 136–145)
SODIUM SERPL-SCNC: 133 MMOL/L (ref 136–145)
SODIUM UR-SCNC: 90 MMOL/L
SODIUM UR-SCNC: 90 MMOL/L
SODIUM/CREAT UR-RTO: 82 MMOL/G CREAT
SODIUM/CREAT UR-RTO: 82 MMOL/G CREAT
SP GR UR STRIP.AUTO: 1.01
SP GR UR STRIP.AUTO: 1.01
UROBILINOGEN UR STRIP.AUTO-MCNC: NORMAL MG/DL
UROBILINOGEN UR STRIP.AUTO-MCNC: NORMAL MG/DL
WBC # BLD AUTO: 4.7 X10*3/UL (ref 4.4–11.3)
WBC # BLD AUTO: 4.7 X10*3/UL (ref 4.4–11.3)

## 2024-09-30 PROCEDURE — 2500000001 HC RX 250 WO HCPCS SELF ADMINISTERED DRUGS (ALT 637 FOR MEDICARE OP): Performed by: STUDENT IN AN ORGANIZED HEALTH CARE EDUCATION/TRAINING PROGRAM

## 2024-09-30 PROCEDURE — 36415 COLL VENOUS BLD VENIPUNCTURE: CPT

## 2024-09-30 PROCEDURE — 82947 ASSAY GLUCOSE BLOOD QUANT: CPT

## 2024-09-30 PROCEDURE — 2500000004 HC RX 250 GENERAL PHARMACY W/ HCPCS (ALT 636 FOR OP/ED)

## 2024-09-30 PROCEDURE — 2500000002 HC RX 250 W HCPCS SELF ADMINISTERED DRUGS (ALT 637 FOR MEDICARE OP, ALT 636 FOR OP/ED)

## 2024-09-30 PROCEDURE — 85025 COMPLETE CBC W/AUTO DIFF WBC: CPT

## 2024-09-30 PROCEDURE — 83930 ASSAY OF BLOOD OSMOLALITY: CPT | Performed by: STUDENT IN AN ORGANIZED HEALTH CARE EDUCATION/TRAINING PROGRAM

## 2024-09-30 PROCEDURE — 84132 ASSAY OF SERUM POTASSIUM: CPT

## 2024-09-30 PROCEDURE — 2500000004 HC RX 250 GENERAL PHARMACY W/ HCPCS (ALT 636 FOR OP/ED): Performed by: STUDENT IN AN ORGANIZED HEALTH CARE EDUCATION/TRAINING PROGRAM

## 2024-09-30 PROCEDURE — 82550 ASSAY OF CK (CPK): CPT | Performed by: STUDENT IN AN ORGANIZED HEALTH CARE EDUCATION/TRAINING PROGRAM

## 2024-09-30 PROCEDURE — 83735 ASSAY OF MAGNESIUM: CPT

## 2024-09-30 PROCEDURE — 99233 SBSQ HOSP IP/OBS HIGH 50: CPT | Performed by: STUDENT IN AN ORGANIZED HEALTH CARE EDUCATION/TRAINING PROGRAM

## 2024-09-30 PROCEDURE — 81003 URINALYSIS AUTO W/O SCOPE: CPT

## 2024-09-30 PROCEDURE — 1200000002 HC GENERAL ROOM WITH TELEMETRY DAILY

## 2024-09-30 PROCEDURE — 36415 COLL VENOUS BLD VENIPUNCTURE: CPT | Performed by: STUDENT IN AN ORGANIZED HEALTH CARE EDUCATION/TRAINING PROGRAM

## 2024-09-30 PROCEDURE — 99232 SBSQ HOSP IP/OBS MODERATE 35: CPT | Performed by: PHYSICIAN ASSISTANT

## 2024-09-30 PROCEDURE — 80069 RENAL FUNCTION PANEL: CPT

## 2024-09-30 PROCEDURE — 2500000001 HC RX 250 WO HCPCS SELF ADMINISTERED DRUGS (ALT 637 FOR MEDICARE OP)

## 2024-09-30 RX ORDER — VANCOMYCIN HYDROCHLORIDE 1 G/200ML
1000 INJECTION, SOLUTION INTRAVENOUS EVERY 12 HOURS
Status: DISCONTINUED | OUTPATIENT
Start: 2024-09-30 | End: 2024-10-01

## 2024-09-30 RX ORDER — AMOXICILLIN 250 MG
2 CAPSULE ORAL 2 TIMES DAILY
Status: DISCONTINUED | OUTPATIENT
Start: 2024-09-30 | End: 2024-10-05 | Stop reason: HOSPADM

## 2024-09-30 ASSESSMENT — PAIN - FUNCTIONAL ASSESSMENT
PAIN_FUNCTIONAL_ASSESSMENT: 0-10

## 2024-09-30 ASSESSMENT — PAIN SCALES - GENERAL
PAINLEVEL_OUTOF10: 6
PAINLEVEL_OUTOF10: 6
PAINLEVEL_OUTOF10: 4
PAINLEVEL_OUTOF10: 6
PAINLEVEL_OUTOF10: 5 - MODERATE PAIN

## 2024-09-30 ASSESSMENT — PAIN DESCRIPTION - LOCATION: LOCATION: HEAD

## 2024-09-30 NOTE — SIGNIFICANT EVENT
09/29/24 2300   Onset Documentation   Rapid Response Initiated By RN   Location/Room [facility])   Pager Time [phone]   Arrival Time 2210   Event End Time 2315   Level II Called No   Primary Reason for Call Sepsis  - see sepsis narrator  (Fever 39.0 C)     Rapid Response Nurse Note:  Rapid Response [x] RADAR alert: Score   Pager time: [phone]  Arrival time: 2210  Event end time: 2315  Location: [facility]  [] Phone triage     Rapid response initiated by:  [] Rapid Response RN [] Family [] Nursing Supervisor [] Physician   [] RADAR auto-page [] Sepsis auto-page [x] RN [] RT   [] NP/PA [] Other:     Primary reason for call:   [] BAT [] New CPAP/BiPAP [] Bleeding [] Change in mental status   [] Chest pain [] Code blue [] FiO2 >/= 50% [] HR </= 40 bpm   [] HR >/= 130 bpm [] Hyperglycemia [] Hypoglycemia [] RADAR    [] RR </= 8 bpm [] RR >/= 30 bpm [] SBP </= 90 mmHg [] SpO2 < 90%   [] Seizure [x] Sepsis [x] Staff concern: acutely febrile, 39.0 C     Initial VS and/or RADAR VS: T 39.2 °C; HR 86; RR 17; /103; SPO2 96% RA  Providers present at bedside: Dr. HEBER Potter, Nocturnist; Dr. BERNA Bridges, NACR  Objective/Subjective data relevant to event: no prior fevers documented; patient alert and oriented to self (name/BD), location, year and denied pain/discomfort; multiple wounds (covered w Mepilex) scalp, left hand, elbow, posterior shoulder, etc; no current antibiotic therapy ordered; receiving IV maintenance fluids at 150cc/hr for Rhabdo on admission; external Lindsay with 600cc UOP in cannister since 1900  Interventions:  [] None [] ABG [] Assist w/ICU transfer [] BAT paged    [] Bag mask [] Blood [] Cardioversion [] Code Blue   [] Code blue for intubation [] Code status changed [x] Chest x-ray [] EKG   [] IV fluid/bolus [] KUB x-ray [x] Labs/cultures (MRSA nares, BC x 2, U/A reflex to cx; VGB [x] Medication   [] Nebulizer treatment [] NIPPV (CPAP/BiPAP) [] Oxygen [] Oral airway   [x] Peripheral IV [] Palliative care consult []  CT/MRI [x] Sepsis protocol    [] Suctioned [] Other:     Name of ICU Provider contacted (if applicable): N/A  Outcome:  [] Coded and  [] Code blue for intubation [] Coded and transferred to ICU []  on division   [x] Remained on division (no change) [] Remained on division + additional monitoring [] Remained in ED [] Transferred to ED   [] Transferred to ICU [] Transferred to inpatient status [] Transferred for interventions (procedure) [] Transferred to ICU stepdown    [] Transferred to surgery [] Transferred to telemetry [] Sepsis protocol [] STEMI protocol   [] Stroke protocol [x] Bedside nurse instructed to page rapid response for any concerns or acute change in condition/VS     Additional Comments: Sepsis Initial Management performed with obtaining blood cultures x 2, MRSA nares, UA, labs and VBG; Zosyn 4.5 grams IVPB and Vancomycin 1 gram IVPB ordered and Zosyn initiated during event; Tylenol 650 mg PO administered during event also; Per MD, will not order fluid bolus at this time per sepsis recommendations d/t hypertension and increased IV fluid therapy this admission r/t Rhabdo diagnosis; primary team to follow up with labs/cultures, division nursing asked to repeat vital signs ~90m minutes following Tylenol administration; patient to remain on current division

## 2024-09-30 NOTE — CARE PLAN
The patient's goals for the shift include have decreased pain    The clinical goals for the shift include Pt will be free from fall/injury during shift    Over the shift, the patient did not make progress toward the following goals. Barriers to progression include   Problem: Skin  Goal: Prevent/manage excess moisture  Flowsheets (Taken 9/30/2024 1133)  Prevent/manage excess moisture: Moisturize dry skin   . Recommendations to address these barriers include   Problem: Skin  Goal: Prevent/manage excess moisture  Flowsheets (Taken 9/30/2024 1133)  Prevent/manage excess moisture: Moisturize dry skin   .

## 2024-09-30 NOTE — SIGNIFICANT EVENT
09/30/24 1100   Provider Notification   Reason for Communication Critical lab value   Provider Name Ben Trevino MD   Provider Role Hospitalist   Method of Communication Secure Text   Details of Communication RN notified of gram negative bacilli blood culture.   Response Waiting for response   Notification Time 1100     MD will continue with broad spectrum antibiotics.    Sabina Mercado RN

## 2024-09-30 NOTE — CONSULTS
Nutrition Note:   Nutrition Assessment    Reason for Assessment: Provider consult order (Nutrition assessment/recommendation)    Patient is a 74 y.o. male presenting with Fall  S/p Occipital trauma with hematoma/Unstageable pressure ulcer to occipital area  - Scheduled for the OR tomorrow 10/01 for scalp debridement    Nutrition History:  Food and Nutrient History: Initial nutrition assessment and recommendation completed and Glucerna ordered BID 9/27/24.  Refer for details. Patient consumed 100% of last 2 meals.      Dietary Orders (From admission, onward)       Start     Ordered    09/27/24 1040  Oral nutritional supplements  Until discontinued        Question Answer Comment   Deliver with Lunch    Deliver with Dinner    Select supplement: Glucerna Shake        09/27/24 1039    09/24/24 1537  Adult diet Regular  Diet effective now        Question:  Diet type  Answer:  Regular    09/24/24 3284                         Nutrition Interventions/Recommendations         Nutrition Prescription:  Individualized Nutrition Prescription Provided for :     Recommend:   1) Continue Regular diet and Glucerna BID.      2) Will add Ensure high protein daily and Pro-Stat BID to further promote wound healing.        Nutrition Interventions:   Goal: Ensure high protein = 160kcal  and 16g protein.  Pro-Stat AWC (advanced wound care) = 100kcal and 17g pro per ounce.             Time Spent (min): 30 minutes

## 2024-09-30 NOTE — PROGRESS NOTES
"  Department of Plastic and Reconstructive Surgery  Daily Progress Note    Patient Name: Seven Robin  MRN: [id number]  Date:  09/30/24     Subjective  Patient offloading pressure from occipital wound. States his pain is controlled. Wound dressed with xeroform at time of exam. Denies any fever, chills, night sweats, CP, SOB, palpitations, nausea, vomiting, or abdominal pain/discomfort.      Objective    Vital Signs  /76   Pulse 61   Temp 37.3 °C (99.1 °F)   Resp 18   Ht 1.803 m (5' 11\")   Wt 94.3 kg (208 lb)   SpO2 97%   BMI 29.01 kg/m²      Physical Exam   Constitutional: A&Ox3, calm and cooperative, NAD.  Eyes: PERRL, EOMI  ENMT: Moist mucous membranes, no apparent injuries or lesions. Small lacerations on left lower lip and left side of chin, appear to be healing, scabbing on chin wounds, mild lower lip edema.  Head/Neck: NC/AT. Scalp has large wound on superior posterior aspect. 15X 7 cm. Intermittent black eschar on wound bed, improved from previous assessment. Scant bloody drainage present. Dressed with xeroform.  Cardiovascular: Normal rate and regular rhythm. 2+ equal pulses of the distal extremities.  Respiratory/Thorax: CTAB, regular respirations on RA. Good symmetric chest expansion.   Gastrointestinal: Abdomen soft, NTND, +BS x4, +BM  Genitourinary: voiding independently via urinal, clear yellow output  Extremities: No peripheral edema. Generalized weakness, 1+ edema to BLE and BUE, abrasions to left elbow and shoulder, knees  Neurological: A&Ox3.   Psychological: Appropriate mood and behavior.      Diagnostics   Results for orders placed or performed during the hospital encounter of 09/24/24 (from the past 24 hour(s))   POCT GLUCOSE   Result Value Ref Range    POCT Glucose 162 (H) 74 - 99 mg/dL   POCT GLUCOSE   Result Value Ref Range    POCT Glucose 212 (H) 74 - 99 mg/dL   Blood Culture    Specimen: Peripheral Venipuncture; Blood culture   Result Value Ref Range    Blood Culture Loaded on " Instrument - Culture in progress    Blood Culture    Specimen: Peripheral Venipuncture; Blood culture   Result Value Ref Range    Blood Culture Loaded on Instrument - Culture in progress    Type And Screen   Result Value Ref Range    ABO TYPE B     Rh TYPE POS     ANTIBODY SCREEN NEG    Coagulation Screen   Result Value Ref Range    Protime 14.7 (H) 9.8 - 12.8 seconds    INR 1.3 (H) 0.9 - 1.1    aPTT 27 27 - 38 seconds   Comprehensive Metabolic Panel   Result Value Ref Range    Glucose 137 (H) 74 - 99 mg/dL    Sodium 128 (L) 136 - 145 mmol/L    Potassium 5.2 3.5 - 5.3 mmol/L    Chloride 98 98 - 107 mmol/L    Bicarbonate 23 21 - 32 mmol/L    Anion Gap 12 10 - 20 mmol/L    Urea Nitrogen 9 6 - 23 mg/dL    Creatinine 0.88 0.50 - 1.30 mg/dL    eGFR 90 >60 mL/min/1.73m*2    Calcium 8.4 (L) 8.6 - 10.6 mg/dL    Albumin 3.6 3.4 - 5.0 g/dL    Alkaline Phosphatase 51 33 - 136 U/L    Total Protein 7.1 6.4 - 8.2 g/dL    AST 52 (H) 9 - 39 U/L    Bilirubin, Total 0.8 0.0 - 1.2 mg/dL    ALT 32 10 - 52 U/L   CBC   Result Value Ref Range    WBC 4.5 4.4 - 11.3 x10*3/uL    nRBC 0.0 0.0 - 0.0 /100 WBCs    RBC 4.48 (L) 4.50 - 5.90 x10*6/uL    Hemoglobin 12.6 (L) 13.5 - 17.5 g/dL    Hematocrit 35.3 (L) 41.0 - 52.0 %    MCV 79 (L) 80 - 100 fL    MCH 28.1 26.0 - 34.0 pg    MCHC 35.7 32.0 - 36.0 g/dL    RDW 13.1 11.5 - 14.5 %    Platelets 190 150 - 450 x10*3/uL   Urinalysis with Reflex Culture and Microscopic   Result Value Ref Range    Color, Urine Light-Yellow Light-Yellow, Yellow, Dark-Yellow    Appearance, Urine Clear Clear    Specific Gravity, Urine 1.013 1.005 - 1.035    pH, Urine 6.5 5.0, 5.5, 6.0, 6.5, 7.0, 7.5, 8.0    Protein, Urine NEGATIVE NEGATIVE, 10 (TRACE), 20 (TRACE) mg/dL    Glucose, Urine Normal Normal mg/dL    Blood, Urine NEGATIVE NEGATIVE    Ketones, Urine NEGATIVE NEGATIVE mg/dL    Bilirubin, Urine NEGATIVE NEGATIVE    Urobilinogen, Urine Normal Normal mg/dL    Nitrite, Urine NEGATIVE NEGATIVE    Leukocyte Esterase,  Urine NEGATIVE NEGATIVE   CBC and Auto Differential   Result Value Ref Range    WBC 4.7 4.4 - 11.3 x10*3/uL    nRBC 0.0 0.0 - 0.0 /100 WBCs    RBC 4.43 (L) 4.50 - 5.90 x10*6/uL    Hemoglobin 12.3 (L) 13.5 - 17.5 g/dL    Hematocrit 38.3 (L) 41.0 - 52.0 %    MCV 87 80 - 100 fL    MCH 27.8 26.0 - 34.0 pg    MCHC 32.1 32.0 - 36.0 g/dL    RDW 13.7 11.5 - 14.5 %    Platelets 186 150 - 450 x10*3/uL    Neutrophils % 65.2 40.0 - 80.0 %    Immature Granulocytes %, Automated 0.6 0.0 - 0.9 %    Lymphocytes % 19.1 13.0 - 44.0 %    Monocytes % 13.2 2.0 - 10.0 %    Eosinophils % 1.5 0.0 - 6.0 %    Basophils % 0.4 0.0 - 2.0 %    Neutrophils Absolute 3.06 1.60 - 5.50 x10*3/uL    Immature Granulocytes Absolute, Automated 0.03 0.00 - 0.50 x10*3/uL    Lymphocytes Absolute 0.90 0.80 - 3.00 x10*3/uL    Monocytes Absolute 0.62 0.05 - 0.80 x10*3/uL    Eosinophils Absolute 0.07 0.00 - 0.40 x10*3/uL    Basophils Absolute 0.02 0.00 - 0.10 x10*3/uL   Renal Function Panel   Result Value Ref Range    Glucose 110 (H) 74 - 99 mg/dL    Sodium 133 (L) 136 - 145 mmol/L    Potassium 3.7 3.5 - 5.3 mmol/L    Chloride 100 98 - 107 mmol/L    Bicarbonate 24 21 - 32 mmol/L    Anion Gap 13 10 - 20 mmol/L    Urea Nitrogen 8 6 - 23 mg/dL    Creatinine 0.94 0.50 - 1.30 mg/dL    eGFR 85 >60 mL/min/1.73m*2    Calcium 8.8 8.6 - 10.6 mg/dL    Phosphorus 3.4 2.5 - 4.9 mg/dL    Albumin 3.3 (L) 3.4 - 5.0 g/dL   Magnesium   Result Value Ref Range    Magnesium 1.96 1.60 - 2.40 mg/dL   Creatine Kinase   Result Value Ref Range    Creatine Kinase 750 (H) 0 - 325 U/L   Osmolality   Result Value Ref Range    Osmolality, Serum 270 (L) 280 - 300 mOsm/kg   POCT GLUCOSE   Result Value Ref Range    POCT Glucose 142 (H) 74 - 99 mg/dL   POCT GLUCOSE   Result Value Ref Range    POCT Glucose 133 (H) 74 - 99 mg/dL     MR brain wo IV contrast    Result Date: 9/27/2024  Interpreted By:  Giovanni Valerio,  and Karolyn Conner STUDY: MR BRAIN WO IV CONTRAST;  9/26/2024 8:00 pm    INDICATION: Signs/Symptoms:left facial drop after fall with significant head injury, >24 hrs. pt reports longstanding v. since intial injury.   COMPARISON: CT head 09/24/2024   ACCESSION NUMBER(S): [id number]   ORDERING CLINICIAN: YOU FRANCOIS   TECHNIQUE: Axial T2, FLAIR, DWI, gradient echo T2 and sagittal and coronal T1 weighted images of the brain were acquired.   FINDINGS: There is no diffusion restriction to suggest acute infarction.   There is subtle FLAIR hyperintense, T1 isointense subdural collection present along the medial aspect of the right occipital lobe (series 3, image 20) with blooming artifact present in the dark gradient echo images (series 6, image 20) with asymmetric T1 isointense thickening of the right tentorium, suggestive of layering acute subdural hematoma between the right occipital lobe and the tentorium (series 7, image 34)   There is also a T2 hyperintense, T1 isointense extra-axial collection overlying the right frontal region measuring up to 3 mm in maximal thickness, essentially unchanged in appearance to prior CT head. There may be slight mass effect on the adjacent brain parenchyma, with a 2-3 mm rightward shift of midline structures at the septum pellucidum.   The ventricles and sulci prominent likely due to diffuse parenchymal volume loss. No new ventricular dilatation is identified. Basal cisterns are patent.   There are extensive periventricular and subcortical T2/FLAIR hyperintensities likely represent sequela of small-vessel ischemic changes.   Fluid and edema are again present in the scalp soft tissues bilaterally, perhaps slightly improved compared to prior exam on 09/24/2024.   Visualized large arterial flow voids, including intracranial carotid and basilar artery are preserved.   No abnormal fluid signal is present in the paranasal sinuses or mastoid air cells.       1. No diffusion restriction abnormality is present to suggest an acute infarct. 2. A FLAIR  hyperintense, T1 isointense subdural hematoma is layering between the right occipital lobe in the tentorium, measuring between 2-3 mm in thickness, with minimal mass effect on the adjacent brain parenchyma, new/increased in size from prior CT head dated 09/24/2024. 3. A subtle T2 hyperintense collection overlies the right frontal lobe, measuring up to 3 mm in maximal thickness, with minimal if any mass effect on the adjacent brain parenchyma, and a 2-3 mm leftward shift of midline structures. Collection demonstrates no associated FLAIR hyperintensity or blooming on gradient echo and may represent a more chronic hygroma. 4. Moderate diffuse brain parenchymal volume loss with patchy and confluence areas of hyperintense FLAIR and T2 signal are present in the periventricular and subcortical white matter of bilateral cerebral hemispheres, nonspecific findings favored to represent sequela of microvascular disease. 5. Scalp swelling and edema slightly improved from prior CT dated 09/24/2024.   I personally reviewed the images/study and I agree with the findings as stated by resident physician Dr. Ubaldo Cortes . This study was interpreted at [facility], [place], [place].   MACRO: None   Signed by: Giovanni Valerio 9/27/2024 1:34 AM Dictation workstation:   [id number]      Current Medications  Scheduled medications  [Held by provider] aspirin, 81 mg, oral, Daily  [Held by provider] enoxaparin, 40 mg, subcutaneous, q24h  insulin lispro, 0-5 Units, subcutaneous, TID  iohexol, 150 mL, intravenous, Once in imaging  losartan, 25 mg, oral, Daily  melatonin, 10 mg, oral, Nightly  piperacillin-tazobactam, 4.5 g, intravenous, q6h  polyethylene glycol, 17 g, oral, Daily  risperiDONE, 2.5 mg, oral, Daily  tamsulosin, 0.4 mg, oral, Daily  traZODone, 100 mg, oral, Nightly  vancomycin, 1,000 mg, intravenous, q12h      Continuous medications  sodium chloride 0.9%, 100 mL/hr, Last Rate: 100  mL/hr (09/30/24 3581)      PRN medications  PRN medications: acetaminophen, dextrose, dextrose, glucagon, glucagon, vancomycin     Assessment  Seven Robin is a 74 y.o. male who presented to [facility] on 9/24/2024 as a full trauma activation for mechanical fall with head strike and deformity of the skull after falling from table and remaining on the ground for approximately 12 hours. Reports he was reaching for something and was standing on a table when he fell backwards. His roommate found him in the morning. Reports no loss of consciousness and can recall the fall.  Extensive imaging in the ED with no acute findings aside from scalp wound.  He has remained alert and oriented x 3 with no focal deficits for the duration of his hospitalization.  Had lactic acidosis and rhabdo, improving s/p fluids. Occipital wound with necrosis, wound care was consulted and recommended daily saline irrigation and xeroform dressing with Mepilex border dressing. Plastic Surgery was consulted for management of occipital wound.     Plan/Recommendations  S/p Occipital trauma with hematoma/Unstageable pressure ulcer to occipital area  - Scheduled for the OR tomorrow 10/01 for scalp debridement around Noon    *Please NPO after midnight    *T&S in am    *Routine labs, CBC and Renal in am  - Dressing removed on exam today and xeroform replaced over wound  - Bedside debridement performed 9/27, see procedure note for additional details  - Appreciate wound care recs, currently dressing with xeroform  - Follow wound culture       · 1+ gram positive cocci (preliminary)  - Offload pressure off of occipital wound  - Recommend nutrition optimization for wound healing, albumin and prealbumin monitoring, nutrition supplements Q meal  - Recommend tight glycemic control  - Remainder of care per primary team    Patient and plan discussed with Dr. Danielle.     Rosa M Hyde PA-C   Plastic and Reconstructive Surgery   Jud  Pager #[phone]  Team phone: [phone]

## 2024-09-30 NOTE — RESEARCH NOTES
Artificial Intelligence Monitoring in Nursing (AIMS Nursing) Study    Principle Investigator - Dr. Brent Madden  Research Coordinator - Luis Stanley RN     Patient Name - Seven Robin  Date - 9/30/2024 2:23 PM  Location - [place] [facility]    Seven Robin was approached by Luis Stanley RN to talk about participating in the AIMS Nursing Study. The patient was not able to be approached, a research coordinator will come back at a later time. Study protocol was followed and patient was given study contact information.     Luis Stanley RN

## 2024-09-30 NOTE — CONSULTS
Vancomycin Dosing by Pharmacy- INITIAL    Seven Robin is a 74 y.o. year old male who Pharmacy has been consulted for vancomycin dosing for cellulitis, skin and soft tissue. Based on the patient's indication and renal status this patient will be dosed based on a goal -600.     Renal function is currently stable.    Visit Vitals  /84 (BP Location: Other (Comment), Patient Position: Lying) Comment (BP Location): right lower arm   Pulse 78   Temp (!) 38.2 °C (100.8 °F) (Temporal)   Resp 16        Lab Results   Component Value Date    CREATININE 0.88 2024    CREATININE 0.89 2024    CREATININE 0.77 2024    CREATININE 0.79 2024        Patient weight is as follows:   Vitals:    24 1031   Weight: 94.3 kg (208 lb)       Cultures:  Susceptibility data for the encounter in last 14 days.  Collected Specimen Info Organism   24 Tissue/Biopsy from Wound/Tissue Mixed Gram-Positive and Gram-Negative Bacteria         I/O last 3 completed shifts:  In: 1150 (12.2 mL/kg) [P.O.:150; I.V.:1000 (10.6 mL/kg)]  Out: 4125 (43.7 mL/kg) [Urine:4125 (1.2 mL/kg/hr)]  Weight: 94.3 kg   I/O during current shift:  I/O this shift:  In: -   Out: 1550 [Urine:1550]    Temp (24hrs), Av.4 °C (99.3 °F), Min:36 °C (96.8 °F), Max:39 °C (102.2 °F)         Assessment/Plan     Patient will be given a loading dose of 2,000 mg.  Will initiate vancomycin maintenance, a loading dose of 2,000 mg followed by a dose of 1,000 mg 12 hours later.    This dosing regimen is predicted by [employer] to result in the following pharmacokinetic parameters:  Loading dose: N/A  Regimen: 1000 mg IV every 12 hours.  Start time: 13:30 on 2024  Exposure target: AUC24 (range)400-600 mg/L.hr   SRO17-03: 486 mg/L.hr  AUC24,ss: 518 mg/L.hr  Probability of AUC24 > 400: 76 %  Ctrough,ss: 17.1 mg/L  Probability of Ctrough,ss > 20: 36 %    Follow-up level will be ordered on 10/1 at AM lab unless clinically indicated sooner.  Will  continue to monitor renal function daily while on vancomycin and order serum creatinine at least every 48 hours if not already ordered.  Follow for continued vancomycin needs, clinical response, and signs/symptoms of toxicity.       Alvaro Krishnan, PharmD

## 2024-09-30 NOTE — PROGRESS NOTES
Transitional Care Coordination Progress Note:  Patient discussed during interdisciplinary rounds.  Team members present: MD, DEB, SW  Plan per Medical/Surgical team: Per chart review pt with rapid response over night with c/f sepsis, broad spectrum IV abx Vanc/Zosyn ordered for gram negative bacteremia, ID team consulted.  Payer: [employer] Dual Complete  Status: Inpatient  Discharge disposition: Skilled nursing facility pending acceptance.  Potential Barriers: none  ADOD: 10/4  SW consulted for arrangement of SNF placement. Care coordinator will continue to follow for discharge planning needs.     Addendum 1507: Per attending pt is planned for OR tomorrow with Plastics Surgery team, anticipated discharge date is 10/4 vs 10/5.    Brad Mendez RN  Transitional Care Coordinator (TCC)  [phone] or [phone]

## 2024-09-30 NOTE — SIGNIFICANT EVENT
DACR RR NOTE:     Rapid called around 2200 for temperature of 39 and c/f sepsis. Pt otherwise vitally stable and mildly hypertensive to 180s/80-90s with HR in 80s satting 96 on RA. Pt w/o acute complaint or localizing symptoms. Mentating well. Blood cx x2,  UA w cx, CXR ordered for infectious workup. Pt started on vanc/zosyn for c/f possible skin source given recent scalp debridement. Holding off on bolus of fluids given pt already receiving fluids for rhabdo and hypertensive but will re-evaluate if things change. Pt stable to remain on regular medical floor for now.

## 2024-09-30 NOTE — PROGRESS NOTES
"Seven Robin is a 74 y.o. male on Hospital Day: 7 of admission presenting with Fall, initial encounter.    Subjective   Rapid overnight for c/f sepsis. Started on broad spectrum abx and infectious work up. Afebrile this morning. States that he is doing okay this morning. Denies any F/C, N/V, CP, SOB, or abdominal pain       Objective     Physical Exam  Vitals and nursing note reviewed.   Constitutional:       General: He is not in acute distress.  HENT:      Head:      Comments: Posterior scalp contusion, stable, wrapped with Kerlix  Eyes:      General: No scleral icterus.     Extraocular Movements: Extraocular movements intact.   Cardiovascular:      Rate and Rhythm: Normal rate and regular rhythm.      Heart sounds: No murmur heard.  Pulmonary:      Effort: Pulmonary effort is normal. No respiratory distress.      Breath sounds: Normal breath sounds.   Abdominal:      General: Abdomen is flat. There is no distension.      Palpations: Abdomen is soft.      Tenderness: There is no abdominal tenderness.   Musculoskeletal:         General: No swelling. Normal range of motion.   Skin:     General: Skin is warm and dry.   Neurological:      Mental Status: He is alert and oriented to person, place, and time.         Last Recorded Vitals  Blood pressure 141/81, pulse 69, temperature 36.7 °C (98.1 °F), resp. rate 18, height 1.803 m (5' 11\"), weight 94.3 kg (208 lb), SpO2 94%.  Intake/Output last 3 Shifts:  I/O last 3 completed shifts:  In: 1150 (12.2 mL/kg) [P.O.:150; I.V.:1000 (10.6 mL/kg)]  Out: 4725 (50.1 mL/kg) [Urine:4725 (1.4 mL/kg/hr)]  Weight: 94.3 kg     Relevant Results  Results reviewed       Scheduled Medications:  [Held by provider] aspirin, 81 mg, oral, Daily  [Held by provider] enoxaparin, 40 mg, subcutaneous, q24h  insulin lispro, 0-5 Units, subcutaneous, TID  iohexol, 150 mL, intravenous, Once in imaging  losartan, 25 mg, oral, Daily  melatonin, 10 mg, oral, Nightly  piperacillin-tazobactam, 4.5 g, " intravenous, q6h  polyethylene glycol, 17 g, oral, Daily  risperiDONE, 2.5 mg, oral, Daily  tamsulosin, 0.4 mg, oral, Daily  traZODone, 100 mg, oral, Nightly  vancomycin, 1,000 mg, intravenous, q12h      Continuous Medications:  sodium chloride 0.9%, 100 mL/hr, Last Rate: 100 mL/hr (09/29/24 1642)      PRN Medications:  PRN medications: acetaminophen, dextrose, dextrose, glucagon, glucagon, vancomycin       Assessment/Plan   Seven Robin is a 74 y.o. male on Hospital Day: 7 of admission presenting with Fall, initial encounter. Pt reports he was standing on a table when he fell backwards hitting his head last night. No proceeding symptoms, no b/b incontinence, or AMS post-event. Has a large postior scalp contusion. No new neuro deficits. Denies any other associated symptoms other than pain in his head and back. Trauma eval was negative, imaging neg for fracture or bleed. Had lactic acidosis and rhabdo, CPK with slow downtrend s/p fluids and continuing IVF. Trop with mild elevation, no EKG new changes or clinical anginal equivalents at this time. MRI obtained due to facial asymmetry, speech changes, vision change more likely related to facial trauma and swelling, showed 3mm SDH hematoma, now improving and do not appear to have been neuro deficits. Neurosurg consulted, appears to be seen on CT but not in report, stable. Holding asa and dvt px also in setting of scalp hematoma/pressure injury. Plastics and ent consulted, s/p debridement of scalp. Nutrition consulted for healing, continuing offloading and wound care per plastics. Patient with RR overnight. Started on Empiric Abx due to concern for sepsis. Blood cultures drawn and growing gram negative bacilli    #Mechanical Fall  #large posterior scalp unstagable pressure injury/eschar/hematoma  #SDH, 3mm read as subacute to chronic.   - no bleed or fractures identified, trauma evaluated and signed off  - MRI obtained due to facial asymmetry, speech changes, vision  change more likely related to facial trauma and swelling, showed 3mm SDH hematoma, now improving and do not appear to have been neuro deficits  Neurosurg consulted, appears to be seen on CT but not in report, stable. Holding asa and dvt px also in setting of scalp hematoma/pressure injury.    Plastics and ent consulted, s/p debridement of scalp. Nutrition consulted for healing, continuing offloading and wound care per plastics.   - Cultures collected on 9/27. Prelim  mixed Gram-Positive and Gram-Negative Bacteria  - nutrition consult to assist healing, prealbumin and albumin added on  - pt/ot eval, mod intesnity, is a 2 person assist. able to self reposition in bed.   - wound care consulted, plastics now consulted. Following plastics for recommendations for wound care.     #Gram negative Bacteremia  - c/f for sepsis overnight  - Continue broad spectrum abx  - Blood cultures x2 growing G- bacilli  - Narrow abx based on speciation, consider ID consult     #rhabdomyolysis   - CK elevated to 3,189, lactate 6.9 -> 2.9 s/p IVF   -Trend CK, still downtrending but still at 2.5k,  increased fluids to 200cc/hr. No resp complications at this time.  CPK down to 1.4k, decreased fluids to 100cc/hr. Today CPK decreased to 750. Will DC fluids today    #Hyponatremia  - Likely secondary to fluids  - Urine and Serum osmolality added on  - DC fluids today, continue to monitor     #troponin elevation  - mild uptrend initially then downtrend  - no angina or equivalents  - low concern for acs, likely related to demand  - denies chest pain or equivalents     #HTN   #HLD  -On lovastatin, triamterene-hydroCHLOROthiazide 37.5-25 mg daily, and ASA 81mg   -C/w ASA 81 mg daily   -Holding statin iso c/f rhabo   -bp controlled     #DM   -Check A1c: 6.5  -Holding home Metformin 500mg BID   -Start SSI 1:50 for glc >150     #Schizoaffective disorder  #MDD   #insomnia   -On propanolol 10 mg, Risperidone 2.5 mg tablet and trazodone 100 mg tablet   -C/w  Risperidone 2.5 mg and Trazodone 100mg      #BPH  -C/w Tamsulosin     Disposition: Pending clinical improvement and further management for bacteremia. Will DC to SNF when ready.     I spent 55 minutes in the professional and overall care of this patient.             Michele Trevino MD

## 2024-10-01 ENCOUNTER — ANESTHESIA (OUTPATIENT)
Dept: OPERATING ROOM | Facility: HOSPITAL | Age: 74
End: 2024-10-01
Payer: COMMERCIAL

## 2024-10-01 ENCOUNTER — APPOINTMENT (OUTPATIENT)
Dept: CARDIOLOGY | Facility: HOSPITAL | Age: 74
End: 2024-10-01
Payer: COMMERCIAL

## 2024-10-01 LAB
ABO GROUP (TYPE) IN BLOOD: NORMAL
ABO GROUP (TYPE) IN BLOOD: NORMAL
ALBUMIN SERPL BCP-MCNC: 3.2 G/DL (ref 3.4–5)
ALBUMIN SERPL BCP-MCNC: 3.2 G/DL (ref 3.4–5)
ANION GAP SERPL CALC-SCNC: 14 MMOL/L (ref 10–20)
ANION GAP SERPL CALC-SCNC: 14 MMOL/L (ref 10–20)
ANTIBODY SCREEN: NORMAL
ANTIBODY SCREEN: NORMAL
ATRIAL RATE: 82 BPM
BASOPHILS # BLD AUTO: 0.02 X10*3/UL (ref 0–0.1)
BASOPHILS # BLD AUTO: 0.02 X10*3/UL (ref 0–0.1)
BASOPHILS NFR BLD AUTO: 0.5 %
BASOPHILS NFR BLD AUTO: 0.5 %
BUN SERPL-MCNC: 10 MG/DL (ref 6–23)
BUN SERPL-MCNC: 10 MG/DL (ref 6–23)
CALCIUM SERPL-MCNC: 8.6 MG/DL (ref 8.6–10.6)
CALCIUM SERPL-MCNC: 8.6 MG/DL (ref 8.6–10.6)
CHLORIDE SERPL-SCNC: 101 MMOL/L (ref 98–107)
CHLORIDE SERPL-SCNC: 101 MMOL/L (ref 98–107)
CK SERPL-CCNC: 377 U/L (ref 0–325)
CK SERPL-CCNC: 377 U/L (ref 0–325)
CO2 SERPL-SCNC: 24 MMOL/L (ref 21–32)
CO2 SERPL-SCNC: 24 MMOL/L (ref 21–32)
CREAT SERPL-MCNC: 0.95 MG/DL (ref 0.5–1.3)
CREAT SERPL-MCNC: 0.95 MG/DL (ref 0.5–1.3)
EGFRCR SERPLBLD CKD-EPI 2021: 84 ML/MIN/1.73M*2
EGFRCR SERPLBLD CKD-EPI 2021: 84 ML/MIN/1.73M*2
EOSINOPHIL # BLD AUTO: 0.14 X10*3/UL (ref 0–0.4)
EOSINOPHIL # BLD AUTO: 0.14 X10*3/UL (ref 0–0.4)
EOSINOPHIL NFR BLD AUTO: 3.2 %
EOSINOPHIL NFR BLD AUTO: 3.2 %
ERYTHROCYTE [DISTWIDTH] IN BLOOD BY AUTOMATED COUNT: 13.6 % (ref 11.5–14.5)
ERYTHROCYTE [DISTWIDTH] IN BLOOD BY AUTOMATED COUNT: 13.6 % (ref 11.5–14.5)
GLUCOSE BLD MANUAL STRIP-MCNC: 126 MG/DL (ref 74–99)
GLUCOSE BLD MANUAL STRIP-MCNC: 136 MG/DL (ref 74–99)
GLUCOSE BLD MANUAL STRIP-MCNC: 136 MG/DL (ref 74–99)
GLUCOSE SERPL-MCNC: 129 MG/DL (ref 74–99)
GLUCOSE SERPL-MCNC: 129 MG/DL (ref 74–99)
HCT VFR BLD AUTO: 39.7 % (ref 41–52)
HCT VFR BLD AUTO: 39.7 % (ref 41–52)
HGB BLD-MCNC: 13.1 G/DL (ref 13.5–17.5)
HGB BLD-MCNC: 13.1 G/DL (ref 13.5–17.5)
IMM GRANULOCYTES # BLD AUTO: 0.03 X10*3/UL (ref 0–0.5)
IMM GRANULOCYTES # BLD AUTO: 0.03 X10*3/UL (ref 0–0.5)
IMM GRANULOCYTES NFR BLD AUTO: 0.7 % (ref 0–0.9)
IMM GRANULOCYTES NFR BLD AUTO: 0.7 % (ref 0–0.9)
LYMPHOCYTES # BLD AUTO: 0.78 X10*3/UL (ref 0.8–3)
LYMPHOCYTES # BLD AUTO: 0.78 X10*3/UL (ref 0.8–3)
LYMPHOCYTES NFR BLD AUTO: 17.6 %
LYMPHOCYTES NFR BLD AUTO: 17.6 %
MAGNESIUM SERPL-MCNC: 2.08 MG/DL (ref 1.6–2.4)
MAGNESIUM SERPL-MCNC: 2.08 MG/DL (ref 1.6–2.4)
MCH RBC QN AUTO: 28.1 PG (ref 26–34)
MCH RBC QN AUTO: 28.1 PG (ref 26–34)
MCHC RBC AUTO-ENTMCNC: 33 G/DL (ref 32–36)
MCHC RBC AUTO-ENTMCNC: 33 G/DL (ref 32–36)
MCV RBC AUTO: 85 FL (ref 80–100)
MCV RBC AUTO: 85 FL (ref 80–100)
MONOCYTES # BLD AUTO: 0.92 X10*3/UL (ref 0.05–0.8)
MONOCYTES # BLD AUTO: 0.92 X10*3/UL (ref 0.05–0.8)
MONOCYTES NFR BLD AUTO: 20.7 %
MONOCYTES NFR BLD AUTO: 20.7 %
NEUTROPHILS # BLD AUTO: 2.55 X10*3/UL (ref 1.6–5.5)
NEUTROPHILS # BLD AUTO: 2.55 X10*3/UL (ref 1.6–5.5)
NEUTROPHILS NFR BLD AUTO: 57.3 %
NEUTROPHILS NFR BLD AUTO: 57.3 %
NRBC BLD-RTO: 0 /100 WBCS (ref 0–0)
NRBC BLD-RTO: 0 /100 WBCS (ref 0–0)
P AXIS: 53 DEGREES
P OFFSET: 207 MS
P ONSET: 157 MS
PHOSPHATE SERPL-MCNC: 3.8 MG/DL (ref 2.5–4.9)
PHOSPHATE SERPL-MCNC: 3.8 MG/DL (ref 2.5–4.9)
PLATELET # BLD AUTO: 195 X10*3/UL (ref 150–450)
PLATELET # BLD AUTO: 195 X10*3/UL (ref 150–450)
POTASSIUM SERPL-SCNC: 3.8 MMOL/L (ref 3.5–5.3)
POTASSIUM SERPL-SCNC: 3.8 MMOL/L (ref 3.5–5.3)
PR INTERVAL: 132 MS
Q ONSET: 223 MS
QRS COUNT: 14 BEATS
QRS DURATION: 98 MS
QT INTERVAL: 374 MS
QTC CALCULATION(BAZETT): 436 MS
QTC FREDERICIA: 415 MS
R AXIS: 27 DEGREES
RBC # BLD AUTO: 4.66 X10*6/UL (ref 4.5–5.9)
RBC # BLD AUTO: 4.66 X10*6/UL (ref 4.5–5.9)
RH FACTOR (ANTIGEN D): NORMAL
RH FACTOR (ANTIGEN D): NORMAL
SODIUM SERPL-SCNC: 135 MMOL/L (ref 136–145)
SODIUM SERPL-SCNC: 135 MMOL/L (ref 136–145)
STAPHYLOCOCCUS SPEC CULT: NORMAL
STAPHYLOCOCCUS SPEC CULT: NORMAL
T AXIS: 49 DEGREES
T OFFSET: 410 MS
VANCOMYCIN SERPL-MCNC: 12 UG/ML (ref 5–20)
VANCOMYCIN SERPL-MCNC: 12 UG/ML (ref 5–20)
VENTRICULAR RATE: 82 BPM
WBC # BLD AUTO: 4.4 X10*3/UL (ref 4.4–11.3)
WBC # BLD AUTO: 4.4 X10*3/UL (ref 4.4–11.3)

## 2024-10-01 PROCEDURE — 87070 CULTURE OTHR SPECIMN AEROBIC: CPT | Performed by: PHYSICIAN ASSISTANT

## 2024-10-01 PROCEDURE — 2780000003 HC OR 278 NO HCPCS: Performed by: SURGERY

## 2024-10-01 PROCEDURE — 86901 BLOOD TYPING SEROLOGIC RH(D): CPT | Performed by: STUDENT IN AN ORGANIZED HEALTH CARE EDUCATION/TRAINING PROGRAM

## 2024-10-01 PROCEDURE — 87102 FUNGUS ISOLATION CULTURE: CPT | Performed by: PHYSICIAN ASSISTANT

## 2024-10-01 PROCEDURE — 1200000002 HC GENERAL ROOM WITH TELEMETRY DAILY

## 2024-10-01 PROCEDURE — 15275 SKIN SUB GRAFT FACE/NK/HF/G: CPT | Performed by: SURGERY

## 2024-10-01 PROCEDURE — 99232 SBSQ HOSP IP/OBS MODERATE 35: CPT

## 2024-10-01 PROCEDURE — 99100 ANES PT EXTEME AGE<1 YR&>70: CPT | Performed by: STUDENT IN AN ORGANIZED HEALTH CARE EDUCATION/TRAINING PROGRAM

## 2024-10-01 PROCEDURE — 2500000005 HC RX 250 GENERAL PHARMACY W/O HCPCS: Performed by: STUDENT IN AN ORGANIZED HEALTH CARE EDUCATION/TRAINING PROGRAM

## 2024-10-01 PROCEDURE — 80069 RENAL FUNCTION PANEL: CPT

## 2024-10-01 PROCEDURE — 2500000004 HC RX 250 GENERAL PHARMACY W/ HCPCS (ALT 636 FOR OP/ED): Performed by: INTERNAL MEDICINE

## 2024-10-01 PROCEDURE — 80202 ASSAY OF VANCOMYCIN: CPT

## 2024-10-01 PROCEDURE — 36415 COLL VENOUS BLD VENIPUNCTURE: CPT | Performed by: INTERNAL MEDICINE

## 2024-10-01 PROCEDURE — 87040 BLOOD CULTURE FOR BACTERIA: CPT | Performed by: INTERNAL MEDICINE

## 2024-10-01 PROCEDURE — 3600000002 HC OR TIME - INITIAL BASE CHARGE - PROCEDURE LEVEL TWO: Performed by: SURGERY

## 2024-10-01 PROCEDURE — 83735 ASSAY OF MAGNESIUM: CPT

## 2024-10-01 PROCEDURE — 0JB00ZZ EXCISION OF SCALP SUBCUTANEOUS TISSUE AND FASCIA, OPEN APPROACH: ICD-10-PCS | Performed by: SURGERY

## 2024-10-01 PROCEDURE — 2720000007 HC OR 272 NO HCPCS: Performed by: SURGERY

## 2024-10-01 PROCEDURE — 85025 COMPLETE CBC W/AUTO DIFF WBC: CPT

## 2024-10-01 PROCEDURE — 3600000007 HC OR TIME - EACH INCREMENTAL 1 MINUTE - PROCEDURE LEVEL TWO: Performed by: SURGERY

## 2024-10-01 PROCEDURE — C1889 IMPLANT/INSERT DEVICE, NOC: HCPCS | Performed by: SURGERY

## 2024-10-01 PROCEDURE — 15005 WND PREP F/N/HF/G ADDL CM: CPT | Performed by: SURGERY

## 2024-10-01 PROCEDURE — 2500000004 HC RX 250 GENERAL PHARMACY W/ HCPCS (ALT 636 FOR OP/ED): Performed by: SURGERY

## 2024-10-01 PROCEDURE — 7100000002 HC RECOVERY ROOM TIME - EACH INCREMENTAL 1 MINUTE: Performed by: SURGERY

## 2024-10-01 PROCEDURE — 2500000005 HC RX 250 GENERAL PHARMACY W/O HCPCS: Performed by: SURGERY

## 2024-10-01 PROCEDURE — 82947 ASSAY GLUCOSE BLOOD QUANT: CPT

## 2024-10-01 PROCEDURE — 2500000004 HC RX 250 GENERAL PHARMACY W/ HCPCS (ALT 636 FOR OP/ED)

## 2024-10-01 PROCEDURE — 2500000001 HC RX 250 WO HCPCS SELF ADMINISTERED DRUGS (ALT 637 FOR MEDICARE OP): Performed by: INTERNAL MEDICINE

## 2024-10-01 PROCEDURE — 36415 COLL VENOUS BLD VENIPUNCTURE: CPT

## 2024-10-01 PROCEDURE — A11042 PR DEBRIDEMENT, SKIN, SUB-Q TISSUE,=<20 SQ CM

## 2024-10-01 PROCEDURE — A11042 PR DEBRIDEMENT, SKIN, SUB-Q TISSUE,=<20 SQ CM: Performed by: STUDENT IN AN ORGANIZED HEALTH CARE EDUCATION/TRAINING PROGRAM

## 2024-10-01 PROCEDURE — 99233 SBSQ HOSP IP/OBS HIGH 50: CPT | Performed by: INTERNAL MEDICINE

## 2024-10-01 PROCEDURE — 15276 SKIN SUB GRAFT F/N/HF/G ADDL: CPT | Performed by: SURGERY

## 2024-10-01 PROCEDURE — 15004 WOUND PREP F/N/HF/G: CPT | Performed by: SURGERY

## 2024-10-01 PROCEDURE — 3700000001 HC GENERAL ANESTHESIA TIME - INITIAL BASE CHARGE: Performed by: SURGERY

## 2024-10-01 PROCEDURE — 7100000001 HC RECOVERY ROOM TIME - INITIAL BASE CHARGE: Performed by: SURGERY

## 2024-10-01 PROCEDURE — 93005 ELECTROCARDIOGRAM TRACING: CPT

## 2024-10-01 PROCEDURE — 6360000003 HC OR 636 NO HCPCS: Performed by: SURGERY

## 2024-10-01 PROCEDURE — 93010 ELECTROCARDIOGRAM REPORT: CPT | Performed by: INTERNAL MEDICINE

## 2024-10-01 PROCEDURE — 82550 ASSAY OF CK (CPK): CPT | Performed by: STUDENT IN AN ORGANIZED HEALTH CARE EDUCATION/TRAINING PROGRAM

## 2024-10-01 PROCEDURE — 3700000002 HC GENERAL ANESTHESIA TIME - EACH INCREMENTAL 1 MINUTE: Performed by: SURGERY

## 2024-10-01 PROCEDURE — 86850 RBC ANTIBODY SCREEN: CPT | Performed by: STUDENT IN AN ORGANIZED HEALTH CARE EDUCATION/TRAINING PROGRAM

## 2024-10-01 PROCEDURE — 0HR0XK3 REPLACEMENT OF SCALP SKIN WITH NONAUTOLOGOUS TISSUE SUBSTITUTE, FULL THICKNESS, EXTERNAL APPROACH: ICD-10-PCS | Performed by: SURGERY

## 2024-10-01 PROCEDURE — 87205 SMEAR GRAM STAIN: CPT | Performed by: PHYSICIAN ASSISTANT

## 2024-10-01 DEVICE — IMPLANT, CTM FLOW, CONNECTIVE TISSUE, 4.0ML: Type: IMPLANTABLE DEVICE | Site: SCALP | Status: FUNCTIONAL

## 2024-10-01 DEVICE — INTEGRA® MESHED BILAYER WOUND MATRIX 4 IN*5 IN (10 CM*12.5 CM)
Type: IMPLANTABLE DEVICE | Site: SCALP | Status: FUNCTIONAL
Brand: INTEGRA®

## 2024-10-01 DEVICE — IMPLANTABLE DEVICE: Type: IMPLANTABLE DEVICE | Site: SCALP | Status: FUNCTIONAL

## 2024-10-01 RX ORDER — ALBUTEROL SULFATE 0.83 MG/ML
2.5 SOLUTION RESPIRATORY (INHALATION) ONCE AS NEEDED
Status: DISCONTINUED | OUTPATIENT
Start: 2024-10-01 | End: 2024-10-01 | Stop reason: HOSPADM

## 2024-10-01 RX ORDER — LIDOCAINE HYDROCHLORIDE AND EPINEPHRINE 10; 10 MG/ML; UG/ML
INJECTION, SOLUTION INFILTRATION; PERINEURAL AS NEEDED
Status: DISCONTINUED | OUTPATIENT
Start: 2024-10-01 | End: 2024-10-01 | Stop reason: HOSPADM

## 2024-10-01 RX ORDER — LIDOCAINE HYDROCHLORIDE 20 MG/ML
INJECTION, SOLUTION INFILTRATION; PERINEURAL AS NEEDED
Status: DISCONTINUED | OUTPATIENT
Start: 2024-10-01 | End: 2024-10-01

## 2024-10-01 RX ORDER — HYDROMORPHONE HYDROCHLORIDE 1 MG/ML
0.2 INJECTION, SOLUTION INTRAMUSCULAR; INTRAVENOUS; SUBCUTANEOUS EVERY 5 MIN PRN
Status: DISCONTINUED | OUTPATIENT
Start: 2024-10-01 | End: 2024-10-01 | Stop reason: HOSPADM

## 2024-10-01 RX ORDER — FENTANYL CITRATE 50 UG/ML
INJECTION, SOLUTION INTRAMUSCULAR; INTRAVENOUS AS NEEDED
Status: DISCONTINUED | OUTPATIENT
Start: 2024-10-01 | End: 2024-10-01

## 2024-10-01 RX ORDER — SODIUM CHLORIDE 0.9 G/100ML
IRRIGANT IRRIGATION AS NEEDED
Status: DISCONTINUED | OUTPATIENT
Start: 2024-10-01 | End: 2024-10-01 | Stop reason: HOSPADM

## 2024-10-01 RX ORDER — PROPOFOL 10 MG/ML
INJECTION, EMULSION INTRAVENOUS AS NEEDED
Status: DISCONTINUED | OUTPATIENT
Start: 2024-10-01 | End: 2024-10-01

## 2024-10-01 RX ORDER — CEFAZOLIN 1 G/1
INJECTION, POWDER, FOR SOLUTION INTRAVENOUS AS NEEDED
Status: DISCONTINUED | OUTPATIENT
Start: 2024-10-01 | End: 2024-10-01

## 2024-10-01 RX ORDER — SODIUM CHLORIDE, SODIUM LACTATE, POTASSIUM CHLORIDE, CALCIUM CHLORIDE 600; 310; 30; 20 MG/100ML; MG/100ML; MG/100ML; MG/100ML
100 INJECTION, SOLUTION INTRAVENOUS CONTINUOUS
Status: DISCONTINUED | OUTPATIENT
Start: 2024-10-01 | End: 2024-10-01 | Stop reason: HOSPADM

## 2024-10-01 RX ORDER — LABETALOL HYDROCHLORIDE 5 MG/ML
5 INJECTION, SOLUTION INTRAVENOUS ONCE AS NEEDED
Status: DISCONTINUED | OUTPATIENT
Start: 2024-10-01 | End: 2024-10-01 | Stop reason: HOSPADM

## 2024-10-01 RX ORDER — ONDANSETRON HYDROCHLORIDE 2 MG/ML
4 INJECTION, SOLUTION INTRAVENOUS ONCE AS NEEDED
Status: DISCONTINUED | OUTPATIENT
Start: 2024-10-01 | End: 2024-10-01 | Stop reason: HOSPADM

## 2024-10-01 RX ORDER — ACETAMINOPHEN 325 MG/1
650 TABLET ORAL EVERY 4 HOURS PRN
Status: DISCONTINUED | OUTPATIENT
Start: 2024-10-01 | End: 2024-10-01 | Stop reason: HOSPADM

## 2024-10-01 RX ORDER — LIDOCAINE HYDROCHLORIDE 10 MG/ML
0.1 INJECTION, SOLUTION INFILTRATION; PERINEURAL ONCE
Status: DISCONTINUED | OUTPATIENT
Start: 2024-10-01 | End: 2024-10-01 | Stop reason: HOSPADM

## 2024-10-01 RX ORDER — PHENYLEPHRINE HCL IN 0.9% NACL 0.4MG/10ML
SYRINGE (ML) INTRAVENOUS AS NEEDED
Status: DISCONTINUED | OUTPATIENT
Start: 2024-10-01 | End: 2024-10-01

## 2024-10-01 RX ORDER — HYDRALAZINE HYDROCHLORIDE 20 MG/ML
5 INJECTION INTRAMUSCULAR; INTRAVENOUS EVERY 30 MIN PRN
Status: DISCONTINUED | OUTPATIENT
Start: 2024-10-01 | End: 2024-10-01 | Stop reason: HOSPADM

## 2024-10-01 RX ORDER — OXYCODONE HYDROCHLORIDE 5 MG/1
5 TABLET ORAL EVERY 4 HOURS PRN
Status: DISCONTINUED | OUTPATIENT
Start: 2024-10-01 | End: 2024-10-01 | Stop reason: HOSPADM

## 2024-10-01 RX ORDER — HYDROMORPHONE HYDROCHLORIDE 1 MG/ML
0.5 INJECTION, SOLUTION INTRAMUSCULAR; INTRAVENOUS; SUBCUTANEOUS EVERY 5 MIN PRN
Status: DISCONTINUED | OUTPATIENT
Start: 2024-10-01 | End: 2024-10-01 | Stop reason: HOSPADM

## 2024-10-01 RX ORDER — ONDANSETRON HYDROCHLORIDE 2 MG/ML
INJECTION, SOLUTION INTRAVENOUS AS NEEDED
Status: DISCONTINUED | OUTPATIENT
Start: 2024-10-01 | End: 2024-10-01

## 2024-10-01 SDOH — HEALTH STABILITY: MENTAL HEALTH: CURRENT SMOKER: 0

## 2024-10-01 ASSESSMENT — PAIN SCALES - GENERAL
PAINLEVEL_OUTOF10: 0 - NO PAIN
PAINLEVEL_OUTOF10: 5 - MODERATE PAIN
PAINLEVEL_OUTOF10: 5 - MODERATE PAIN
PAINLEVEL_OUTOF10: 0 - NO PAIN

## 2024-10-01 ASSESSMENT — PAIN - FUNCTIONAL ASSESSMENT
PAIN_FUNCTIONAL_ASSESSMENT: UNABLE TO SELF-REPORT
PAIN_FUNCTIONAL_ASSESSMENT: 0-10
PAIN_FUNCTIONAL_ASSESSMENT: UNABLE TO SELF-REPORT
PAIN_FUNCTIONAL_ASSESSMENT: 0-10
PAIN_FUNCTIONAL_ASSESSMENT: 0-10

## 2024-10-01 NOTE — PROGRESS NOTES
Vancomycin Dosing by Pharmacy- FOLLOW UP    Seven Robin is a 74 y.o. year old male who Pharmacy has been consulted for vancomycin dosing for cellulitis, skin and soft tissue. Based on the patient's indication and renal status this patient is being dosed based on a goal AUC of 400-600.     Renal function is currently stable.    Current vancomycin dose: 1000 mg given every 12 hours    Estimated vancomycin AUC on current dose: 451 mg/L.hr     Visit Vitals  /59   Pulse 73   Temp 36.9 °C (98.4 °F)   Resp 18        Lab Results   Component Value Date    CREATININE 0.95 10/01/2024    CREATININE 0.94 2024    CREATININE 0.88 2024    CREATININE 0.89 2024        Patient weight is as follows:   Vitals:    24 1031   Weight: 94.3 kg (208 lb)       Cultures:  Susceptibility data for the encounter in last 14 days.  Collected Specimen Info Organism   24 Blood culture from Peripheral Venipuncture Enterobacter cloacae complex   24 Blood culture from Peripheral Venipuncture Enterobacter cloacae complex   24 Tissue/Biopsy from Wound/Tissue Mixed Gram-Positive and Gram-Negative Bacteria        I/O last 3 completed shifts:  In: 240 (2.5 mL/kg) [P.O.:240]  Out: 6200 (65.7 mL/kg) [Urine:6200 (1.8 mL/kg/hr)]  Weight: 94.3 kg   I/O during current shift:  No intake/output data recorded.    Temp (24hrs), Av.2 °C (97.1 °F), Min:34.6 °C (94.3 °F), Max:37 °C (98.6 °F)      Assessment/Plan    Within goal AUC range. Continue current vancomycin regimen.    This dosing regimen is predicted by [employer] to result in the following pharmacokinetic parameters:  Loading dose: N/A  Regimen: 1000 mg IV every 12 hours.  Start time: 12:18 on 10/01/2024  Exposure target: AUC24 (range)500-600 mg/L.hr   [id number]: 412 mg/L.hr  AUC24,ss: 451 mg/L.hr  Probability of AUC24 > 400: 65 %  Ctrough,ss: 15.8 mg/L  Probability of Ctrough,ss > 20: 30 %    The next level will be obtained on 10/03 at 0500. May be obtained  sooner if clinically indicated.   Will continue to monitor renal function daily while on vancomycin and order serum creatinine at least every 48 hours if not already ordered.  Follow for continued vancomycin needs, clinical response, and signs/symptoms of toxicity.       Chintan Terrell, PharmD

## 2024-10-01 NOTE — ANESTHESIA POSTPROCEDURE EVALUATION
Patient: Seven Robin    Procedure Summary       Date: 10/01/24 Room / Location: [facility] OR 10 / [facility]    Anesthesia Start: 1220 Anesthesia Stop: 1337    Procedure: Debridement Scalp (Bilateral) Diagnosis:       Fall, initial encounter      Avulsion of scalp, initial encounter      (Fall, initial encounter [W19.XXXA])      (Avulsion of scalp, initial encounter [S08.0XXA])    Surgeons: Sherwin Danielle MD Responsible Provider: Daniel Lindsey DO    Anesthesia Type: general ASA Status: 3            Anesthesia Type: general    Vitals Value Taken Time   /70 10/01/24 1344   Temp 37.7 10/01/24 1344   Pulse 98 10/01/24 1341   Resp 8 10/01/24 1341   SpO2 97 % 10/01/24 1341   Vitals shown include unfiled device data.    Anesthesia Post Evaluation    Patient location during evaluation: PACU  Patient participation: complete - patient participated  Level of consciousness: awake  Pain management: adequate  Multimodal analgesia pain management approach  Airway patency: patent  Two or more strategies used to mitigate risk of obstructive sleep apnea  Cardiovascular status: acceptable  Respiratory status: acceptable  Hydration status: acceptable  Postoperative Nausea and Vomiting: none        No notable events documented.

## 2024-10-01 NOTE — ANESTHESIA PROCEDURE NOTES
Airway  Date/Time: 10/1/2024 12:41 PM  Urgency: elective    Airway not difficult    Staffing  Performed: CRNA and SRNA   Authorized by: Daniel Lindsey DO    Performed by: ROBERTA Worthington-JU  Patient location during procedure: OR    Indications and Patient Condition  Indications for airway management: anesthesia  Spontaneous Ventilation: absent  Sedation level: deep  Preoxygenated: yes  Patient position: sniffing  Mask difficulty assessment: 0 - not attempted  Planned trial extubation    Final Airway Details  Final airway type: supraglottic airway      Successful airway: classic  Size 4     Number of attempts at approach: 2    Additional Comments  First attempt SRNA  Second attempt easy CRNA        
Peripheral IV  Date/Time: 10/1/2024 12:37 PM      Placement  Needle size: 22 G  Laterality: left  Location: forearm  Local anesthetic: none  Site prep: alcohol  Technique: anatomical landmarks  Attempts: 1        
No

## 2024-10-01 NOTE — PROGRESS NOTES
Seven Robin is a 74 y.o. male on day 7 of admission presenting with Fall, initial encounter.    SW followed up with choices given for SNF. [facility] messaged SW on [facility] stating they only received pt's MAR and asked for more clinicals. SW attempted to send additional clinicals but they would not send. SW communicated this with [facility] and [facility] and [facility].    SW awaiting response from [facility] to see if they can accept.    Pt ADOD 10/4-10/5.    Fany Perez MSW [facility]  Care Transitions  2  [phone] or Epic Secure Chat

## 2024-10-01 NOTE — OP NOTE
Debridement Scalp (B) Operative Note     Date: 2024 - 10/1/2024  OR Location: [facility] OR    Name: Seven Robin, : 1950, Age: 74 y.o., MRN: [id number], Sex: male    Diagnosis  Pre-op Diagnosis      * Fall, initial encounter [W19.XXXA]     * Avulsion of scalp, initial encounter [S08.0XXA] Post-op Diagnosis     * Fall, initial encounter [W19.XXXA]     * Avulsion of scalp, initial encounter [S08.0XXA]     Procedures  1.  Excisional debridement of scalp skin, subcutaneous tissue, fat, galea, total 14 x 9 cm = 126 cm²  2.  Integra bilayer matrix placement, 1R 26 cm²  3.  Use of biologic injectable      Surgeons      * Sherwin Danielle - Primary    Resident/Fellow/Other Assistant:  Surgeons and Role:     * Kg Etienne MD - Resident - Assisting     * Peyton Hernandez PA-C - SANDRA First Assist    Procedure Summary  Anesthesia: General  ASA: III  Anesthesia Staff: Anesthesiologist: Daniel Lindsey DO  CRNA: Gurmeet Machado APRN-CRNA  SRNA: Torsten Burden  Estimated Blood Loss: 100mL  Intra-op Medications:   Administrations occurring from 1200 to 1355 on 10/01/24:   Medication Name Total Dose   sodium chloride 0.9 % irrigation solution 3,000 mL   lidocaine-epinephrine (Xylocaine W/EPI) 1 %-1:100,000 injection 10 mL   insulin lispro (HumaLOG) injection 0-5 Units Cannot be calculated   piperacillin-tazobactam (Zosyn) 4.5 g in dextrose (iso)  mL Cannot be calculated   vancomycin (Vancocin) 1,000 mg in dextrose 5%  mL Cannot be calculated              Anesthesia Record               Intraprocedure I/O Totals       None           Specimen:   ID Type Source Tests Collected by Time   1 : SCALP WOUND Tissue ABSCESS SURGICAL PATHOLOGY EXAM Sherwin Danielle MD 10/1/2024 1309   A : SCALP WOUND Tissue ABSCESS FUNGAL CULTURE/SMEAR, TISSUE/WOUND CULTURE/SMEAR Sherwin Danielle MD 10/1/2024 1307        Staff:   Circulator: Sienna Dobbinsub Person: Ana Mireles Person: Doug  Circulator: Darryl Posey  and/or Catheters:   External Urinary Catheter Male (Active)   External Catheter Status In place 10/01/24 0812   Collection Container Other (Comment) 10/01/24 0812   Output (mL) 300 mL 10/01/24 0648       Tourniquet Times:         Implants:     Findings: Approximately 14 x 9 cm of nonviable skin, subcutaneous tissue and portions of galea.    Indications: Seven Robin is an 74 y.o. male who is having surgery for Fall, initial encounter [W19.XXXA]  Avulsion of scalp, initial encounter [S08.0XXA].     Seven Robin is a 74 y.o. male who presented to [facility] on 9/24/2024 as a full trauma activation for mechanical fall with head strike and deformity of the skull after falling from table and remaining on the ground for about 12 hours. Reports he was reaching for something and was standing on a table when he fell backwards, he kept trying to get up but was too weak and was down for 12 hours, lives with roommate who didn't come home until the morning. Reports no  loss of consciousness and can recall the fall.  Extensive imaging in the ED with no acute findings.  He has remained alert and oriented x 3 with no focal deficits for the duration of his hospitalization.  Had lactic acidosis and rhabdo, improving s/p fluids. Had significant occipital wound with necrosis and wound care was consulted and recommended daily saline irrigation, xeroform dressing with Mepilex border dressing.Wound care consulted for head wound, unstageable pressure injury, covered with xeroform and mepilex. Plastic Surgery was consulted for management of occipital and vertex wound.     INFORMED CONSENT  Patient was told the risks, benefits, INDICATIONS, contraindications, and alternatives to the procedure. Risks include but not limited to pain, infection, bleeding, hematoma, seroma, injury to neurovascular and tendinous structures, need for repeat procedure, possible muscle flap procedure, possible rotation, possible skin graft, and need for subsequent  surgeries.      The patient demonstrated understanding of these risks and agreed to proceed with surgery.  Advance directives discussed.  Team approach explained.       The patient consented and wished to proceed with the procedure and for medical photography if needed.     PROCEDURAL PAUSE  Prior to the beginning of the procedure, the patient's correct identity, side, site, and procedure to be performed were verified.  The patient was given intravenous antibiotics prior to skin incision.     PROCEDURAL NOTE  Seven was brought to the operative theater at which point he was transferred to the operating room table.  All bony prominences were well padded, and sequential compression devices were placed to each lower extremity. Patient was then secured with safety straps.  The patient was then placed under IV sedation, and our anesthesia colleagues administered general endotracheal anesthesia..    Scalp wound was prepped and draped in a standard sterile fashion with Betadine.    After prepping and draping, we performed excisional debridement with 15 and 10 blade scalpel total of 14 x 9 cm, we dissected through skin, subcutaneous tissue and portions of galea.  This appeared to be a pressure phenomenon wound after excision.  There was healthy bleeding in the layers beneath the nonviable eschar.  We spent significant time performing hemostasis with unipolar and bipolar cautery.    Given the history of chronic wound and decreased wound healing ability, concern was for  adequate and damaged tissue at the site increasing risk of failure and complication, and therefore CTM flow was injected to the peripheral margins as well as base of the wound prior to closure to supplement the damaged tissue and replacing adequate tissue and healing milieu.    After this was completed Integra bilayer matrix was customized to the wound and stapled into the periphery of the wound.  Dressing consisted of Telfa, fluffs for bolster, ABD,  compressive Ace bandage.  The patient tolerated the procedure well and was awakened from anesthesia without any difficulties, he was transferred to the PACU in stable condition, all needle counts were correct.     POST OP PLAN:  Seven will remain on primary team.  We will plan to continue Integra therapy for 2 to 3 weeks, and see if a date is possible in the next 2 to 4 weeks for return to the OR for possible split-thickness skin graft.    Sherwin Danielle  Phone Number: [phone]

## 2024-10-01 NOTE — PROGRESS NOTES
"Seven Robin is a 74 y.o. male on day 7 of admission presenting with Fall, initial encounter.    Subjective   Lying in bed. No distress.  Patient recently came back from scalp debridement with plastic surgery.    Objective     General: Lying in bed without distress.  Cooperative.  Skin: Laceration on scalp, currently covered in dressing.  HEENT: Sclera is white.  Mucous membranes moist.  Neck: Supple.  No JVD.  Cardiac: Regular rate and rhythm, S1/S2 normal.  Lungs: Clear to auscultation bilaterally, no wheezing or crackles, no accessory muscle use at rest.  Abdomen: Soft, nontender, nondistended, BS +  Extremities: No cyanosis.  No lower extremity edema.  Neurologic: Alert and oriented x3.  No focal deficits.  Psychiatric: Appropriate mood and behavior.  Currently no agitation.    Last Recorded Vitals  Blood pressure 144/76, pulse 85, temperature 36.8 °C (98.2 °F), temperature source Temporal, resp. rate 14, height 1.803 m (5' 11\"), weight 94.3 kg (208 lb), SpO2 94%.  On room air.    Intake/Output last 3 Shifts:  I/O last 3 completed shifts:  In: 240 (2.5 mL/kg) [P.O.:240]  Out: 6200 (65.7 mL/kg) [Urine:6200 (1.8 mL/kg/hr)]  Weight: 94.3 kg     Relevant Results  [Held by provider] aspirin, 81 mg, oral, Daily  [Held by provider] enoxaparin, 40 mg, subcutaneous, q24h  insulin lispro, 0-5 Units, subcutaneous, TID  losartan, 25 mg, oral, Daily  melatonin, 10 mg, oral, Nightly  piperacillin-tazobactam, 4.5 g, intravenous, q6h  polyethylene glycol, 17 g, oral, Daily  risperiDONE, 2.5 mg, oral, Daily  sennosides-docusate sodium, 2 tablet, oral, BID  tamsulosin, 0.4 mg, oral, Daily  traZODone, 100 mg, oral, Nightly           PRN medications: acetaminophen, dextrose, dextrose, glucagon, glucagon     Results for orders placed or performed during the hospital encounter of 09/24/24 (from the past 24 hour(s))   POCT GLUCOSE   Result Value Ref Range    POCT Glucose 126 (H) 74 - 99 mg/dL   CBC and Auto Differential   Result " Value Ref Range    WBC 4.4 4.4 - 11.3 x10*3/uL    nRBC 0.0 0.0 - 0.0 /100 WBCs    RBC 4.66 4.50 - 5.90 x10*6/uL    Hemoglobin 13.1 (L) 13.5 - 17.5 g/dL    Hematocrit 39.7 (L) 41.0 - 52.0 %    MCV 85 80 - 100 fL    MCH 28.1 26.0 - 34.0 pg    MCHC 33.0 32.0 - 36.0 g/dL    RDW 13.6 11.5 - 14.5 %    Platelets 195 150 - 450 x10*3/uL    Neutrophils % 57.3 40.0 - 80.0 %    Immature Granulocytes %, Automated 0.7 0.0 - 0.9 %    Lymphocytes % 17.6 13.0 - 44.0 %    Monocytes % 20.7 2.0 - 10.0 %    Eosinophils % 3.2 0.0 - 6.0 %    Basophils % 0.5 0.0 - 2.0 %    Neutrophils Absolute 2.55 1.60 - 5.50 x10*3/uL    Immature Granulocytes Absolute, Automated 0.03 0.00 - 0.50 x10*3/uL    Lymphocytes Absolute 0.78 (L) 0.80 - 3.00 x10*3/uL    Monocytes Absolute 0.92 (H) 0.05 - 0.80 x10*3/uL    Eosinophils Absolute 0.14 0.00 - 0.40 x10*3/uL    Basophils Absolute 0.02 0.00 - 0.10 x10*3/uL   Renal Function Panel   Result Value Ref Range    Glucose 129 (H) 74 - 99 mg/dL    Sodium 135 (L) 136 - 145 mmol/L    Potassium 3.8 3.5 - 5.3 mmol/L    Chloride 101 98 - 107 mmol/L    Bicarbonate 24 21 - 32 mmol/L    Anion Gap 14 10 - 20 mmol/L    Urea Nitrogen 10 6 - 23 mg/dL    Creatinine 0.95 0.50 - 1.30 mg/dL    eGFR 84 >60 mL/min/1.73m*2    Calcium 8.6 8.6 - 10.6 mg/dL    Phosphorus 3.8 2.5 - 4.9 mg/dL    Albumin 3.2 (L) 3.4 - 5.0 g/dL   Magnesium   Result Value Ref Range    Magnesium 2.08 1.60 - 2.40 mg/dL   Creatine Kinase   Result Value Ref Range    Creatine Kinase 377 (H) 0 - 325 U/L   Vancomycin   Result Value Ref Range    Vancomycin 12.0 5.0 - 20.0 ug/mL   Type and Screen   Result Value Ref Range    ABO TYPE B     Rh TYPE POS     ANTIBODY SCREEN NEG    POCT GLUCOSE   Result Value Ref Range    POCT Glucose 126 (H) 74 - 99 mg/dL   POCT GLUCOSE   Result Value Ref Range    POCT Glucose 136 (H) 74 - 99 mg/dL   Electrocardiogram, 12-lead   Result Value Ref Range    Ventricular Rate 82 BPM    Atrial Rate 82 BPM    GA Interval 132 ms    QRS Duration  98 ms    QT Interval 374 ms    QTC Calculation(Bazett) 436 ms    P Axis 53 degrees    R Axis 27 degrees    T Axis 49 degrees    QRS Count 14 beats    Q Onset 223 ms    P Onset 157 ms    P Offset 207 ms    T Offset 410 ms    QTC Fredericia 415 ms      MR brain wo IV contrast    Result Date: 9/27/2024  Interpreted By:  Giovanni Valerio,  and Karolyn Conner STUDY: MR BRAIN WO IV CONTRAST;  9/26/2024 8:00 pm   INDICATION: Signs/Symptoms:left facial drop after fall with significant head injury, >24 hrs. pt reports longstanding v. since intial injury.   COMPARISON: CT head 09/24/2024   ACCESSION NUMBER(S): [id number]   ORDERING CLINICIAN: YOU FRANCOIS   TECHNIQUE: Axial T2, FLAIR, DWI, gradient echo T2 and sagittal and coronal T1 weighted images of the brain were acquired.   FINDINGS: There is no diffusion restriction to suggest acute infarction.   There is subtle FLAIR hyperintense, T1 isointense subdural collection present along the medial aspect of the right occipital lobe (series 3, image 20) with blooming artifact present in the dark gradient echo images (series 6, image 20) with asymmetric T1 isointense thickening of the right tentorium, suggestive of layering acute subdural hematoma between the right occipital lobe and the tentorium (series 7, image 34)   There is also a T2 hyperintense, T1 isointense extra-axial collection overlying the right frontal region measuring up to 3 mm in maximal thickness, essentially unchanged in appearance to prior CT head. There may be slight mass effect on the adjacent brain parenchyma, with a 2-3 mm rightward shift of midline structures at the septum pellucidum.   The ventricles and sulci prominent likely due to diffuse parenchymal volume loss. No new ventricular dilatation is identified. Basal cisterns are patent.   There are extensive periventricular and subcortical T2/FLAIR hyperintensities likely represent sequela of small-vessel ischemic changes.   Fluid and  edema are again present in the scalp soft tissues bilaterally, perhaps slightly improved compared to prior exam on 09/24/2024.   Visualized large arterial flow voids, including intracranial carotid and basilar artery are preserved.   No abnormal fluid signal is present in the paranasal sinuses or mastoid air cells.       1. No diffusion restriction abnormality is present to suggest an acute infarct. 2. A FLAIR hyperintense, T1 isointense subdural hematoma is layering between the right occipital lobe in the tentorium, measuring between 2-3 mm in thickness, with minimal mass effect on the adjacent brain parenchyma, new/increased in size from prior CT head dated 09/24/2024. 3. A subtle T2 hyperintense collection overlies the right frontal lobe, measuring up to 3 mm in maximal thickness, with minimal if any mass effect on the adjacent brain parenchyma, and a 2-3 mm leftward shift of midline structures. Collection demonstrates no associated FLAIR hyperintensity or blooming on gradient echo and may represent a more chronic hygroma. 4. Moderate diffuse brain parenchymal volume loss with patchy and confluence areas of hyperintense FLAIR and T2 signal are present in the periventricular and subcortical white matter of bilateral cerebral hemispheres, nonspecific findings favored to represent sequela of microvascular disease. 5. Scalp swelling and edema slightly improved from prior CT dated 09/24/2024.   I personally reviewed the images/study and I agree with the findings as stated by resident physician Dr. Ubaldo Cortes . This study was interpreted at [facility], [place], [place].   MACRO: None   Signed by: Giovanni Valerio 9/27/2024 1:34 AM Dictation workstation:   [id number]       Hospital Course:  Seven Robin is a 74 y.o. male with PMHx of HTN, HLD, DM, cirrhosis 2/2 treated Hep C (Tx [id number]), BPH, schizoaffective disorder and prostate CA s/p EBRT presenting to the ED s/p  mechanical fall leading to trauma activation.  Patient's power had gone out and he was checking out his breaker box and was standing on a table when he lost his balance and fell backwards.  Patient sustained a scalp laceration, imaging negative for fractures bleed patient noted to have lactic acidosis and rhabdomyolysis.  With IV fluids rhabdomyolysis has significantly improved and almost resolved.  MRI obtained due to facial asymmetry, speech changes, vision change more likely related to facial trauma and swelling, showed 3mm SDH hematoma, now improving and do not appear to have neuro deficits. Neurosurg consulted, appears to be seen on CT but not in report, stable. Holding asa and dvt px also in setting of scalp hematoma/pressure injury. Plastics and ent consulted, s/p debridement of scalp on 10/1.  Blood cultures from 9/29 2/4 bottles growing gram-negative bacilli, eventually grew out Enterobacter cloacae.    Assessment/Plan     Mechanical Fall  Large posterior scalp unstagable pressure injury/eschar/hematoma  SDH, 3mm read as subacute to chronic  -no bleed or fractures identified, trauma evaluated and signed off  -MRI obtained due to facial asymmetry, speech changes, vision change more likely related to facial trauma and swelling, showed 3mm SDH hematoma, now improving and do not appear to have been neuro deficits. Neurosurg consulted, appears to be seen on CT but not in report, stable. Holding asa and dvt px also in setting of scalp hematoma/pressure injury.    Plastics and ent consulted, s/p debridement of scalp on 10/1. Nutrition consulted for healing, continuing offloading and wound care per plastics.   -Wound cultures collected on 9/27. Prelim  mixed Gram-Positive and Gram-Negative Bacteria  -pt/ot recommending moderate intensity.  -Plastic surgery following.  Await further input.     Gram negative Bacteremia  -Continue IV Zosyn.  Can discontinue IV vancomycin.  -Blood cultures x2 growing G- bacilli, today  preliminary identified as Enterobacter cloacae.  Awaiting sensitivities.  -Ordered repeat blood culture today.  -Likely source of infection is the scalp laceration and injury, patient is status post debridement today.     Rhabdomyolysis   -CK elevated to 3,593, since then with IV fluids CPK has improved down to 377.  -Resolved.     Hyponatremia  -Likely secondary to fluids  -Improved, last sodium level 135, only mildly low at this time.  Monitor as needed.     troponin elevation  -mild uptrend initially then downtrend  -no angina or equivalents  -low concern for acs, likely related to demand  -denies chest pain or equivalents     HTN   HLD  -On lovastatin, triamterene-hydroCHLOROthiazide 37.5-25 mg daily, and ASA 81mg   -Holding ASA 81 mg daily   -Holding statin iso c/f rhabo.  Can probably resume on discharge.  -Systolic blood pressure 110s to 140s.  Monitor.     DM type 2  -A1c: 6.5  -Holding home Metformin 500mg BID   -Continue SSI 1:50 for glc >150     Schizoaffective disorder  MDD   insomnia   -On propanolol 10 mg, Risperidone 2.5 mg tablet and trazodone 100 mg tablet   -C/w Risperidone 2.5 mg and Trazodone 100mg      BPH  -C/w Tamsulosin     Disposition: Repeat blood cultures and monitor for clearance, await further input from plastic surgery about scalp wound, PT/OT has recommended SNF will work on placement.    Riri Huitron MD

## 2024-10-01 NOTE — BRIEF OP NOTE
Date: 10/1/2024  OR Location: [facility] OR    Name: Seven Robin : 1950, Age: 74 y.o., MRN: [id number], Sex: male    Diagnosis  Pre-op Diagnosis      * Fall, initial encounter [W19.XXXA]     * Avulsion of scalp, initial encounter [S08.0XXA] Post-op Diagnosis     * Fall, initial encounter [W19.XXXA]     * Avulsion of scalp, initial encounter [S08.0XXA]     Procedures  Debridement scalp, placement of integra.    Surgeons      * Sherwin Danielle - Primary    Resident/Fellow/Other Assistant:  Surgeons and Role:     * Kg Etienne MD - Resident - Assisting     * Peyton Hernandez PA-C - SANDRA First Assist    Procedure Summary  Anesthesia: General  ASA: III  Anesthesia Staff: Anesthesiologist: Daniel Lindsey DO  CRNA: Gurmeet Machado, APRN-CRNA  SRNA: Torsten Burden  Estimated Blood Loss: 20mL  Intra-op Medications:   Administrations occurring from 1200 to 1355 on 10/01/24:   Medication Name Total Dose   sodium chloride 0.9 % irrigation solution 3,000 mL   lidocaine-epinephrine (Xylocaine W/EPI) 1 %-1:100,000 injection 10 mL   insulin lispro (HumaLOG) injection 0-5 Units Cannot be calculated   piperacillin-tazobactam (Zosyn) 4.5 g in dextrose (iso)  mL Cannot be calculated   vancomycin (Vancocin) 1,000 mg in dextrose 5%  mL Cannot be calculated              Anesthesia Record               Intraprocedure I/O Totals          Intake    LR bolus 400.00 mL    Total Intake 400 mL          Specimen:   ID Type Source Tests Collected by Time   1 : SCALP WOUND Tissue ABSCESS SURGICAL PATHOLOGY EXAM Sherwin Danielle MD 10/1/2024 1309   A : SCALP WOUND Tissue ABSCESS FUNGAL CULTURE/SMEAR, TISSUE/WOUND CULTURE/SMEAR Sherwin Danielle MD 10/1/2024 1307        Staff:   Jimmyulator: Sienna Dobbinsub Person: Ana Dobbinsub Person: Doug  Circulator: Darryl          Findings: 14x7cm scalp wound debrided sharply with good vascularization of wound bed noted.     Complications:  None; patient tolerated the procedure well.      Disposition: PACU - hemodynamically stable.  Condition: stable  Specimens Collected:   ID Type Source Tests Collected by Time   1 : SCALP WOUND Tissue ABSCESS SURGICAL PATHOLOGY EXAM hSerwin Danielle MD 10/1/2024 8869   A : SCALP WOUND Tissue ABSCESS FUNGAL CULTURE/SMEAR, TISSUE/WOUND CULTURE/SMEAR Sherwin Danielle MD 10/1/2024 2414     Attending Attestation:     Sherwin Danielle  Phone Number: [phone]

## 2024-10-01 NOTE — PROGRESS NOTES
Vancomycin Dosing by Pharmacy- Cessation of Therapy    Consult to pharmacy for vancomycin dosing has been discontinued by the prescriber, pharmacy will sign off at this time.    Please call pharmacy if there are further questions or re-enter a consult if vancomycin is resumed.     Aleida Garg, JasbirD

## 2024-10-01 NOTE — PROGRESS NOTES
"  Department of Plastic and Reconstructive Surgery  Daily Progress/Post Op Note    Patient Name: Seven Robin  MRN: [id number]  Date:  10/01/24     Subjective  Patient s/p scalp wound debridement in OR today (10/1). Wound covered intra-op with Integra, plans to skin graft in 2-3 weeks. Patient seen on arrival back to floor. States his pain is 5/10. Denies any CP, SOB, nausea, vomiting, or abdominal pain/discomfort.    Objective    Vital Signs  /64   Pulse 83   Temp 36.3 °C (97.3 °F) (Axillary)   Resp 14   Ht 1.803 m (5' 11\")   Wt 94.3 kg (208 lb)   SpO2 97%   BMI 29.01 kg/m²      Physical Exam   Constitutional: Calm and cooperative, NAD.  Eyes: PERRL, EOMI  ENMT: Moist mucous membranes, no apparent injuries or lesions. Small lacerations on left lower lip and left side of chin, appear to be healing, scabbing on chin wounds, mild lower lip edema.  Head/Neck: Occipital scalp wound covered with ACE dressing.  Cardiovascular: Normal rate and regular rhythm. 2+ equal pulses of the distal extremities.  Respiratory/Thorax: Regular respirations on 3 L NC. Good symmetric chest expansion.   Gastrointestinal: Abdomen soft, NTND  Genitourinary: Voiding independently via urinal, clear yellow output  Extremities: Generalized weakness, 1+ edema to BLE and BUE, abrasions to left elbow and shoulder, knees  Neurological: A&Ox3.   Psychological: Appropriate mood and behavior.    Diagnostics   Results for orders placed or performed during the hospital encounter of 09/24/24 (from the past 24 hour(s))   POCT GLUCOSE   Result Value Ref Range    POCT Glucose 146 (H) 74 - 99 mg/dL   POCT GLUCOSE   Result Value Ref Range    POCT Glucose 126 (H) 74 - 99 mg/dL   CBC and Auto Differential   Result Value Ref Range    WBC 4.4 4.4 - 11.3 x10*3/uL    nRBC 0.0 0.0 - 0.0 /100 WBCs    RBC 4.66 4.50 - 5.90 x10*6/uL    Hemoglobin 13.1 (L) 13.5 - 17.5 g/dL    Hematocrit 39.7 (L) 41.0 - 52.0 %    MCV 85 80 - 100 fL    MCH 28.1 26.0 - 34.0 pg "    MCHC 33.0 32.0 - 36.0 g/dL    RDW 13.6 11.5 - 14.5 %    Platelets 195 150 - 450 x10*3/uL    Neutrophils % 57.3 40.0 - 80.0 %    Immature Granulocytes %, Automated 0.7 0.0 - 0.9 %    Lymphocytes % 17.6 13.0 - 44.0 %    Monocytes % 20.7 2.0 - 10.0 %    Eosinophils % 3.2 0.0 - 6.0 %    Basophils % 0.5 0.0 - 2.0 %    Neutrophils Absolute 2.55 1.60 - 5.50 x10*3/uL    Immature Granulocytes Absolute, Automated 0.03 0.00 - 0.50 x10*3/uL    Lymphocytes Absolute 0.78 (L) 0.80 - 3.00 x10*3/uL    Monocytes Absolute 0.92 (H) 0.05 - 0.80 x10*3/uL    Eosinophils Absolute 0.14 0.00 - 0.40 x10*3/uL    Basophils Absolute 0.02 0.00 - 0.10 x10*3/uL   Renal Function Panel   Result Value Ref Range    Glucose 129 (H) 74 - 99 mg/dL    Sodium 135 (L) 136 - 145 mmol/L    Potassium 3.8 3.5 - 5.3 mmol/L    Chloride 101 98 - 107 mmol/L    Bicarbonate 24 21 - 32 mmol/L    Anion Gap 14 10 - 20 mmol/L    Urea Nitrogen 10 6 - 23 mg/dL    Creatinine 0.95 0.50 - 1.30 mg/dL    eGFR 84 >60 mL/min/1.73m*2    Calcium 8.6 8.6 - 10.6 mg/dL    Phosphorus 3.8 2.5 - 4.9 mg/dL    Albumin 3.2 (L) 3.4 - 5.0 g/dL   Magnesium   Result Value Ref Range    Magnesium 2.08 1.60 - 2.40 mg/dL   Creatine Kinase   Result Value Ref Range    Creatine Kinase 377 (H) 0 - 325 U/L   Vancomycin   Result Value Ref Range    Vancomycin 12.0 5.0 - 20.0 ug/mL   Type and Screen   Result Value Ref Range    ABO TYPE B     Rh TYPE POS     ANTIBODY SCREEN NEG    POCT GLUCOSE   Result Value Ref Range    POCT Glucose 126 (H) 74 - 99 mg/dL   POCT GLUCOSE   Result Value Ref Range    POCT Glucose 136 (H) 74 - 99 mg/dL   Electrocardiogram, 12-lead   Result Value Ref Range    Ventricular Rate 82 BPM    Atrial Rate 82 BPM    KS Interval 132 ms    QRS Duration 98 ms    QT Interval 374 ms    QTC Calculation(Bazett) 436 ms    P Axis 53 degrees    R Axis 27 degrees    T Axis 49 degrees    QRS Count 14 beats    Q Onset 223 ms    P Onset 157 ms    P Offset 207 ms    T Offset 410 ms    QTC Fredericia  415 ms     MR brain wo IV contrast    Result Date: 9/27/2024  Interpreted By:  Giovanni Valerio  and Karolyn Conner STUDY: MR BRAIN WO IV CONTRAST;  9/26/2024 8:00 pm   INDICATION: Signs/Symptoms:left facial drop after fall with significant head injury, >24 hrs. pt reports longstanding v. since intial injury.   COMPARISON: CT head 09/24/2024   ACCESSION NUMBER(S): [id number]   ORDERING CLINICIAN: YOU FRANCOIS   TECHNIQUE: Axial T2, FLAIR, DWI, gradient echo T2 and sagittal and coronal T1 weighted images of the brain were acquired.   FINDINGS: There is no diffusion restriction to suggest acute infarction.   There is subtle FLAIR hyperintense, T1 isointense subdural collection present along the medial aspect of the right occipital lobe (series 3, image 20) with blooming artifact present in the dark gradient echo images (series 6, image 20) with asymmetric T1 isointense thickening of the right tentorium, suggestive of layering acute subdural hematoma between the right occipital lobe and the tentorium (series 7, image 34)   There is also a T2 hyperintense, T1 isointense extra-axial collection overlying the right frontal region measuring up to 3 mm in maximal thickness, essentially unchanged in appearance to prior CT head. There may be slight mass effect on the adjacent brain parenchyma, with a 2-3 mm rightward shift of midline structures at the septum pellucidum.   The ventricles and sulci prominent likely due to diffuse parenchymal volume loss. No new ventricular dilatation is identified. Basal cisterns are patent.   There are extensive periventricular and subcortical T2/FLAIR hyperintensities likely represent sequela of small-vessel ischemic changes.   Fluid and edema are again present in the scalp soft tissues bilaterally, perhaps slightly improved compared to prior exam on 09/24/2024.   Visualized large arterial flow voids, including intracranial carotid and basilar artery are preserved.   No  abnormal fluid signal is present in the paranasal sinuses or mastoid air cells.       1. No diffusion restriction abnormality is present to suggest an acute infarct. 2. A FLAIR hyperintense, T1 isointense subdural hematoma is layering between the right occipital lobe in the tentorium, measuring between 2-3 mm in thickness, with minimal mass effect on the adjacent brain parenchyma, new/increased in size from prior CT head dated 09/24/2024. 3. A subtle T2 hyperintense collection overlies the right frontal lobe, measuring up to 3 mm in maximal thickness, with minimal if any mass effect on the adjacent brain parenchyma, and a 2-3 mm leftward shift of midline structures. Collection demonstrates no associated FLAIR hyperintensity or blooming on gradient echo and may represent a more chronic hygroma. 4. Moderate diffuse brain parenchymal volume loss with patchy and confluence areas of hyperintense FLAIR and T2 signal are present in the periventricular and subcortical white matter of bilateral cerebral hemispheres, nonspecific findings favored to represent sequela of microvascular disease. 5. Scalp swelling and edema slightly improved from prior CT dated 09/24/2024.   I personally reviewed the images/study and I agree with the findings as stated by resident physician Dr. Ubaldo Cortes . This study was interpreted at [facility], [place], [place].   MACRO: None   Signed by: Giovanni Valerio 9/27/2024 1:34 AM Dictation workstation:   [id number]      Current Medications  Scheduled medications  [Transfer Hold] aspirin, 81 mg, oral, Daily  [Held by provider] enoxaparin, 40 mg, subcutaneous, q24h  [Transfer Hold] insulin lispro, 0-5 Units, subcutaneous, TID  [Transfer Hold] iohexol, 150 mL, intravenous, Once in imaging  [Transfer Hold] losartan, 25 mg, oral, Daily  [Transfer Hold] melatonin, 10 mg, oral, Nightly  [Transfer Hold] piperacillin-tazobactam, 4.5 g, intravenous,  q6h  [Transfer Hold] polyethylene glycol, 17 g, oral, Daily  [Transfer Hold] risperiDONE, 2.5 mg, oral, Daily  [Transfer Hold] sennosides-docusate sodium, 2 tablet, oral, BID  [Transfer Hold] tamsulosin, 0.4 mg, oral, Daily  [Transfer Hold] traZODone, 100 mg, oral, Nightly  [Transfer Hold] vancomycin, 1,000 mg, intravenous, q12h      Continuous medications       PRN medications  PRN medications: [Transfer Hold] acetaminophen, [Transfer Hold] dextrose, [Transfer Hold] dextrose, [Transfer Hold] glucagon, [Transfer Hold] glucagon, [Transfer Hold] vancomycin     Assessment  Seven Robin is a 74 y.o. male who presented to [facility] on [facility]/24/2024 as a full trauma activation for mechanical fall with head strike and deformity of the skull after falling from table and remaining on the ground for approximately 12 hours. Reports he was reaching for something and was standing on a table when he fell backwards. His roommate found him in the morning. Reports no loss of consciousness and can recall the fall.  Extensive imaging in the ED with no acute findings aside from scalp wound.  He has remained alert and oriented x 3 with no focal deficits for the duration of his hospitalization.  Had lactic acidosis and rhabdo, improving s/p fluids. Occipital wound with necrosis, wound care was consulted and recommended daily saline irrigation and xeroform dressing with Mepilex border dressing. Plastic Surgery was consulted for management of occipital wound.     Plan/Recommendations  S/p Scalp wound debridement 10/1  - Pain control per primary team  - Ok to resume previous diet as tolerated  - Ok to resume DVT prophylaxis  - Maintain surgical dressing  - Please inform plastics surgery if dressing is removed or if bleeding through dressing  - Continue antibiotics per primary and follow up intraoperative cultures from wound  · Enterobacter cloacae complex (preliminary)  - Offload pressure off of occipital wound  - Recommend nutrition  optimization for wound healing, albumin and prealbumin monitoring, nutrition supplements Q meal  - Recommend tight glycemic control  - Remainder of care per primary team    Patient and plan discussed with Dr. Danielle.     Peyton Hernandez PA-C  Plastic and Reconstructive Surgery   [place]  Pager #[phone]  Team phone: [phone]

## 2024-10-01 NOTE — SIGNIFICANT EVENT
Plastics Surgery Post-Operative Plan    S/p scalp wound debridement.     Plan:  pain control per primary team.   Ok to resume previous diet as tolerated.   Continue antibiotics per primary and follow up intraoperative cultures from wound.   Ok to resume lovenox for DVT Ppx and to resume aspirin.   Wound: scalp wound.   Maintain surgical dressing.   Please inform plastics surgery if dressing is removed or if bleeding through dressing.     Kg Etienne MD  General surgery PGY 3  Plastics surgery service [phone]

## 2024-10-02 LAB
ALBUMIN SERPL BCP-MCNC: 3.4 G/DL (ref 3.4–5)
ALBUMIN SERPL BCP-MCNC: 3.4 G/DL (ref 3.4–5)
ANION GAP SERPL CALC-SCNC: 14 MMOL/L (ref 10–20)
ANION GAP SERPL CALC-SCNC: 14 MMOL/L (ref 10–20)
BACTERIA BLD AEROBE CULT: ABNORMAL
BACTERIA BLD AEROBE CULT: ABNORMAL
BACTERIA BLD CULT: ABNORMAL
BACTERIA BLD CULT: ABNORMAL
BASOPHILS # BLD AUTO: 0.03 X10*3/UL (ref 0–0.1)
BASOPHILS # BLD AUTO: 0.03 X10*3/UL (ref 0–0.1)
BASOPHILS NFR BLD AUTO: 0.8 %
BASOPHILS NFR BLD AUTO: 0.8 %
BUN SERPL-MCNC: 12 MG/DL (ref 6–23)
BUN SERPL-MCNC: 12 MG/DL (ref 6–23)
CALCIUM SERPL-MCNC: 8.6 MG/DL (ref 8.6–10.6)
CALCIUM SERPL-MCNC: 8.6 MG/DL (ref 8.6–10.6)
CHLORIDE SERPL-SCNC: 99 MMOL/L (ref 98–107)
CHLORIDE SERPL-SCNC: 99 MMOL/L (ref 98–107)
CK SERPL-CCNC: 218 U/L (ref 0–325)
CK SERPL-CCNC: 218 U/L (ref 0–325)
CO2 SERPL-SCNC: 25 MMOL/L (ref 21–32)
CO2 SERPL-SCNC: 25 MMOL/L (ref 21–32)
CREAT SERPL-MCNC: 0.94 MG/DL (ref 0.5–1.3)
CREAT SERPL-MCNC: 0.94 MG/DL (ref 0.5–1.3)
EGFRCR SERPLBLD CKD-EPI 2021: 85 ML/MIN/1.73M*2
EGFRCR SERPLBLD CKD-EPI 2021: 85 ML/MIN/1.73M*2
EOSINOPHIL # BLD AUTO: 0.18 X10*3/UL (ref 0–0.4)
EOSINOPHIL # BLD AUTO: 0.18 X10*3/UL (ref 0–0.4)
EOSINOPHIL NFR BLD AUTO: 5 %
EOSINOPHIL NFR BLD AUTO: 5 %
ERYTHROCYTE [DISTWIDTH] IN BLOOD BY AUTOMATED COUNT: 13.6 % (ref 11.5–14.5)
ERYTHROCYTE [DISTWIDTH] IN BLOOD BY AUTOMATED COUNT: 13.6 % (ref 11.5–14.5)
GLUCOSE BLD MANUAL STRIP-MCNC: 131 MG/DL (ref 74–99)
GLUCOSE BLD MANUAL STRIP-MCNC: 131 MG/DL (ref 74–99)
GLUCOSE BLD MANUAL STRIP-MCNC: 133 MG/DL (ref 74–99)
GLUCOSE BLD MANUAL STRIP-MCNC: 133 MG/DL (ref 74–99)
GLUCOSE BLD MANUAL STRIP-MCNC: 134 MG/DL (ref 74–99)
GLUCOSE BLD MANUAL STRIP-MCNC: 134 MG/DL (ref 74–99)
GLUCOSE BLD MANUAL STRIP-MCNC: 158 MG/DL (ref 74–99)
GLUCOSE BLD MANUAL STRIP-MCNC: 158 MG/DL (ref 74–99)
GLUCOSE SERPL-MCNC: 256 MG/DL (ref 74–99)
GLUCOSE SERPL-MCNC: 256 MG/DL (ref 74–99)
GRAM STN SPEC: ABNORMAL
HCT VFR BLD AUTO: 38.9 % (ref 41–52)
HCT VFR BLD AUTO: 38.9 % (ref 41–52)
HGB BLD-MCNC: 12.6 G/DL (ref 13.5–17.5)
HGB BLD-MCNC: 12.6 G/DL (ref 13.5–17.5)
IMM GRANULOCYTES # BLD AUTO: 0.02 X10*3/UL (ref 0–0.5)
IMM GRANULOCYTES # BLD AUTO: 0.02 X10*3/UL (ref 0–0.5)
IMM GRANULOCYTES NFR BLD AUTO: 0.6 % (ref 0–0.9)
IMM GRANULOCYTES NFR BLD AUTO: 0.6 % (ref 0–0.9)
LYMPHOCYTES # BLD AUTO: 0.8 X10*3/UL (ref 0.8–3)
LYMPHOCYTES # BLD AUTO: 0.8 X10*3/UL (ref 0.8–3)
LYMPHOCYTES NFR BLD AUTO: 22.3 %
LYMPHOCYTES NFR BLD AUTO: 22.3 %
MAGNESIUM SERPL-MCNC: 2.23 MG/DL (ref 1.6–2.4)
MAGNESIUM SERPL-MCNC: 2.23 MG/DL (ref 1.6–2.4)
MCH RBC QN AUTO: 27.9 PG (ref 26–34)
MCH RBC QN AUTO: 27.9 PG (ref 26–34)
MCHC RBC AUTO-ENTMCNC: 32.4 G/DL (ref 32–36)
MCHC RBC AUTO-ENTMCNC: 32.4 G/DL (ref 32–36)
MCV RBC AUTO: 86 FL (ref 80–100)
MCV RBC AUTO: 86 FL (ref 80–100)
MONOCYTES # BLD AUTO: 0.38 X10*3/UL (ref 0.05–0.8)
MONOCYTES # BLD AUTO: 0.38 X10*3/UL (ref 0.05–0.8)
MONOCYTES NFR BLD AUTO: 10.6 %
MONOCYTES NFR BLD AUTO: 10.6 %
NEUTROPHILS # BLD AUTO: 2.18 X10*3/UL (ref 1.6–5.5)
NEUTROPHILS # BLD AUTO: 2.18 X10*3/UL (ref 1.6–5.5)
NEUTROPHILS NFR BLD AUTO: 60.7 %
NEUTROPHILS NFR BLD AUTO: 60.7 %
NRBC BLD-RTO: 0 /100 WBCS (ref 0–0)
NRBC BLD-RTO: 0 /100 WBCS (ref 0–0)
PHOSPHATE SERPL-MCNC: 3.5 MG/DL (ref 2.5–4.9)
PHOSPHATE SERPL-MCNC: 3.5 MG/DL (ref 2.5–4.9)
PLATELET # BLD AUTO: 190 X10*3/UL (ref 150–450)
PLATELET # BLD AUTO: 190 X10*3/UL (ref 150–450)
POTASSIUM SERPL-SCNC: 4.1 MMOL/L (ref 3.5–5.3)
POTASSIUM SERPL-SCNC: 4.1 MMOL/L (ref 3.5–5.3)
RBC # BLD AUTO: 4.52 X10*6/UL (ref 4.5–5.9)
RBC # BLD AUTO: 4.52 X10*6/UL (ref 4.5–5.9)
SODIUM SERPL-SCNC: 134 MMOL/L (ref 136–145)
SODIUM SERPL-SCNC: 134 MMOL/L (ref 136–145)
WBC # BLD AUTO: 3.6 X10*3/UL (ref 4.4–11.3)
WBC # BLD AUTO: 3.6 X10*3/UL (ref 4.4–11.3)

## 2024-10-02 PROCEDURE — 2500000001 HC RX 250 WO HCPCS SELF ADMINISTERED DRUGS (ALT 637 FOR MEDICARE OP): Performed by: INTERNAL MEDICINE

## 2024-10-02 PROCEDURE — 2500000001 HC RX 250 WO HCPCS SELF ADMINISTERED DRUGS (ALT 637 FOR MEDICARE OP): Performed by: STUDENT IN AN ORGANIZED HEALTH CARE EDUCATION/TRAINING PROGRAM

## 2024-10-02 PROCEDURE — 99233 SBSQ HOSP IP/OBS HIGH 50: CPT | Performed by: INTERNAL MEDICINE

## 2024-10-02 PROCEDURE — 80069 RENAL FUNCTION PANEL: CPT | Performed by: INTERNAL MEDICINE

## 2024-10-02 PROCEDURE — 2500000005 HC RX 250 GENERAL PHARMACY W/O HCPCS: Performed by: INTERNAL MEDICINE

## 2024-10-02 PROCEDURE — 82947 ASSAY GLUCOSE BLOOD QUANT: CPT

## 2024-10-02 PROCEDURE — 85025 COMPLETE CBC W/AUTO DIFF WBC: CPT | Performed by: INTERNAL MEDICINE

## 2024-10-02 PROCEDURE — 97530 THERAPEUTIC ACTIVITIES: CPT | Mod: GO

## 2024-10-02 PROCEDURE — 2500000002 HC RX 250 W HCPCS SELF ADMINISTERED DRUGS (ALT 637 FOR MEDICARE OP, ALT 636 FOR OP/ED): Performed by: INTERNAL MEDICINE

## 2024-10-02 PROCEDURE — 83735 ASSAY OF MAGNESIUM: CPT | Performed by: INTERNAL MEDICINE

## 2024-10-02 PROCEDURE — 2500000004 HC RX 250 GENERAL PHARMACY W/ HCPCS (ALT 636 FOR OP/ED): Performed by: INTERNAL MEDICINE

## 2024-10-02 PROCEDURE — 1210000001 HC SEMI-PRIVATE ROOM DAILY

## 2024-10-02 PROCEDURE — 97535 SELF CARE MNGMENT TRAINING: CPT | Mod: GO

## 2024-10-02 PROCEDURE — 36415 COLL VENOUS BLD VENIPUNCTURE: CPT | Performed by: INTERNAL MEDICINE

## 2024-10-02 PROCEDURE — 82550 ASSAY OF CK (CPK): CPT | Performed by: INTERNAL MEDICINE

## 2024-10-02 RX ORDER — INSULIN LISPRO 100 [IU]/ML
0-5 INJECTION, SOLUTION INTRAVENOUS; SUBCUTANEOUS
Start: 2024-10-02 | End: 2024-10-05 | Stop reason: HOSPADM

## 2024-10-02 RX ORDER — TAMSULOSIN HYDROCHLORIDE 0.4 MG/1
0.4 CAPSULE ORAL DAILY
Start: 2024-10-03

## 2024-10-02 RX ORDER — AMOXICILLIN 250 MG
2 CAPSULE ORAL 2 TIMES DAILY
Start: 2024-10-02

## 2024-10-02 RX ORDER — POLYETHYLENE GLYCOL 3350 17 G/17G
17 POWDER, FOR SOLUTION ORAL DAILY
Start: 2024-10-03

## 2024-10-02 RX ORDER — RISPERIDONE 0.5 MG/1
2.5 TABLET ORAL DAILY
Start: 2024-10-03

## 2024-10-02 RX ORDER — LIDOCAINE 560 MG/1
1 PATCH PERCUTANEOUS; TOPICAL; TRANSDERMAL DAILY
Start: 2024-10-03

## 2024-10-02 RX ORDER — ACETAMINOPHEN, DIPHENHYDRAMINE HCL, PHENYLEPHRINE HCL 325; 25; 5 MG/1; MG/1; MG/1
10 TABLET ORAL NIGHTLY
Start: 2024-10-02

## 2024-10-02 RX ORDER — SULFAMETHOXAZOLE AND TRIMETHOPRIM 800; 160 MG/1; MG/1
2 TABLET ORAL 2 TIMES DAILY
Qty: 36 TABLET | Refills: 0 | Status: SHIPPED | OUTPATIENT
Start: 2024-10-02 | End: 2024-10-04

## 2024-10-02 RX ORDER — KETOROLAC TROMETHAMINE 30 MG/ML
15 INJECTION, SOLUTION INTRAMUSCULAR; INTRAVENOUS EVERY 6 HOURS PRN
Status: DISCONTINUED | OUTPATIENT
Start: 2024-10-02 | End: 2024-10-05 | Stop reason: HOSPADM

## 2024-10-02 RX ORDER — HYDROXYZINE HYDROCHLORIDE 25 MG/1
25 TABLET, FILM COATED ORAL ONCE
Status: COMPLETED | OUTPATIENT
Start: 2024-10-02 | End: 2024-10-02

## 2024-10-02 RX ORDER — OXYCODONE HYDROCHLORIDE 5 MG/1
5 TABLET ORAL EVERY 4 HOURS PRN
Start: 2024-10-02 | End: 2024-10-05

## 2024-10-02 RX ORDER — LOSARTAN POTASSIUM 25 MG/1
25 TABLET ORAL DAILY
Start: 2024-10-03

## 2024-10-02 RX ORDER — LIDOCAINE 560 MG/1
1 PATCH PERCUTANEOUS; TOPICAL; TRANSDERMAL DAILY
Status: DISCONTINUED | OUTPATIENT
Start: 2024-10-02 | End: 2024-10-05 | Stop reason: HOSPADM

## 2024-10-02 RX ORDER — ACETAMINOPHEN 325 MG/1
650 TABLET ORAL EVERY 8 HOURS PRN
Start: 2024-10-02

## 2024-10-02 RX ORDER — TRAZODONE HYDROCHLORIDE 100 MG/1
100 TABLET ORAL NIGHTLY
Start: 2024-10-02

## 2024-10-02 RX ORDER — SULFAMETHOXAZOLE AND TRIMETHOPRIM 800; 160 MG/1; MG/1
2 TABLET ORAL 2 TIMES DAILY
Status: DISCONTINUED | OUTPATIENT
Start: 2024-10-02 | End: 2024-10-05 | Stop reason: HOSPADM

## 2024-10-02 RX ORDER — OXYCODONE HYDROCHLORIDE 5 MG/1
5 TABLET ORAL EVERY 4 HOURS PRN
Status: DISCONTINUED | OUTPATIENT
Start: 2024-10-02 | End: 2024-10-05 | Stop reason: HOSPADM

## 2024-10-02 ASSESSMENT — PAIN SCALES - GENERAL
PAINLEVEL_OUTOF10: 4
PAINLEVEL_OUTOF10: 4
PAINLEVEL_OUTOF10: 7
PAINLEVEL_OUTOF10: 5 - MODERATE PAIN
PAINLEVEL_OUTOF10: 7
PAINLEVEL_OUTOF10: 0 - NO PAIN
PAINLEVEL_OUTOF10: 5 - MODERATE PAIN
PAINLEVEL_OUTOF10: 4

## 2024-10-02 ASSESSMENT — PAIN DESCRIPTION - ORIENTATION: ORIENTATION: LEFT

## 2024-10-02 ASSESSMENT — COGNITIVE AND FUNCTIONAL STATUS - GENERAL
TOILETING: A LOT
DRESSING REGULAR UPPER BODY CLOTHING: A LOT
HELP NEEDED FOR BATHING: A LOT
PERSONAL GROOMING: A LITTLE
DAILY ACTIVITIY SCORE: 14
EATING MEALS: A LITTLE
DRESSING REGULAR LOWER BODY CLOTHING: A LOT

## 2024-10-02 ASSESSMENT — PAIN - FUNCTIONAL ASSESSMENT
PAIN_FUNCTIONAL_ASSESSMENT: 0-10

## 2024-10-02 ASSESSMENT — PAIN DESCRIPTION - LOCATION
LOCATION: HEAD
LOCATION: SHOULDER
LOCATION: HEAD

## 2024-10-02 ASSESSMENT — PAIN SCALES - PAIN ASSESSMENT IN ADVANCED DEMENTIA (PAINAD): TOTALSCORE: MEDICATION (SEE MAR)

## 2024-10-02 ASSESSMENT — ACTIVITIES OF DAILY LIVING (ADL)
HOME_MANAGEMENT_TIME_ENTRY: 30
BATHING_LEVEL_OF_ASSISTANCE: MODERATE ASSISTANCE
BATHING_WHERE_ASSESSED: EDGE OF BED

## 2024-10-02 NOTE — CARE PLAN
The patient's goals for the shift include have decreased pain    The clinical goals for the shift include Pt will remain safe during shift.    Over the shift, the patient did not make progress toward the following goals. Barriers to progression include   Problem: Skin  Goal: Participates in plan/prevention/treatment measures  Outcome: Progressing   . Recommendations to address these barriers include   Problem: Skin  Goal: Prevent/minimize sheer/friction injuries  Outcome: Progressing   .

## 2024-10-02 NOTE — PROGRESS NOTES
Transitional Care Coordination Progress Note:  Patient discussed during interdisciplinary rounds.  Team members present: MD, DEB, JOHN  Plan per Medical/Surgical team: Per chart review pt is s/p debridement of scalp wound with Plastics Surgery team on 10/1. Per attending monitoring repeat blood cx (pt on oral Bactrim).  Payer: [employer] Dual Complete  Status: Inpatient  Discharge disposition: Skilled nursing facility pending SNF preferences from pt's brother.  Potential Barriers: none  ADOD: 10/4  SW is following for arrangement of SNF placement and updated regarding above plan of care. Care coordinator will continue to follow for discharge planning needs.     Brad Mendez RN  Transitional Care Coordinator (TCC)  [phone] or [phone]

## 2024-10-02 NOTE — PROGRESS NOTES
Occupational Therapy    Occupational Therapy Treatment    Name: Seven Robin  MRN: [id number]  : 1950  Date: 10/02/24  Room: [address] [place], [place] [address]A      Time Calculation  Start Time: 1456  Stop Time: 1538  Time Calculation (min): 42 min    Assessment:  OT Assessment: Decreased ADLs, functional transfers and mobility. Pt would benefit from continued OT intervention to address strength, balance, and activity tolerance deficits and facilitate return to PLOF.  Prognosis: Good  Barriers to Discharge: None  Evaluation/Treatment Tolerance: Patient tolerated treatment well  Medical Staff Made Aware: Yes  End of Session Communication: Bedside nurse  End of Session Patient Position: Bed, 4 rail up, Alarm on  Plan:  Treatment Interventions: ADL retraining, Functional transfer training, UE strengthening/ROM, Endurance training, Cognitive reorientation, Compensatory technique education  OT Frequency: 2 times per week  OT Discharge Recommendations: Moderate intensity level of continued care  Equipment Recommended upon Discharge: Wheeled walker  OT Recommended Transfer Status: Assist of 1  OT - OK to Discharge: Yes    Subjective   General:  OT Last Visit  OT Received On: 10/02/24  Reason for Referral: s/p mechanical fall leading to trauma activation. Pt reports he was standing on a table when he fell backwards hitting his head. Down for 12 hours before being found. Posterior scalp contusion, with obvious scalp deformity and unstageable pressure injury. S/p scalp wound debridement  (10/1).  Past Medical History Relevant to Rehab: HTN, HLD, DM, cirrhosis 2/2 treated Hep C (Tx [id number]), BPH, schizoaffective disorder and prostate CA s/p EBRT  Prior to Session Communication: Bedside nurse  Patient Position Received: Bed, 4 rail up, Alarm on  General Comment: Pt lying in bed on arrival. Lethargic initally but with EOB activity, Pt became more alert. Pt pleasant and participates fully.   Precautions:  Medical Precautions: Fall  precautions  Vitals:  Vital Signs (Past 2hrs)           Lines/Tubes/Drains:  External Urinary Catheter Male (Active)   Number of days: 3     Cognition:  Overall Cognitive Status: Within Functional Limits  Orientation Level: Oriented X4  Following Commands: Follows one step commands with increased time  Processing Speed: Delayed    Pain Assessment:  Pain Assessment  Pain Assessment: 0-10  0-10 (Numeric) Pain Score: 4  Pain Type: Acute pain  Pain Location: Head     Objective   Activities of Daily Living:     UE Bathing  UE Bathing Level of Assistance: Close supervision, Minimal verbal cues  UE Bathing Where Assessed: Edge of bed    LE Bathing  LE Bathing Level of Assistance: Moderate assistance  LE Bathing Where Assessed: Edge of bed  LE Bathing Comments: assist with buttocks and distal B LEs    UE Dressing  UE Dressing Level of Assistance: Minimum assistance  UE Dressing Where Assessed: Edge of bed    LE Dressing  LE Dressing: Yes  Sock Level of Assistance: Moderate assistance  LE Dressing Where Assessed: Edge of bed  LE Dressing Comments: doff/don B socks.    Toileting  Toileting Level of Assistance: Maximum assistance  Where Assessed: Bed level  Toileting Comments: with use of urinal to void. Assist to place urinal & keep in place + hygiene.    Functional Standing Tolerance:  Functional Mobility  Functional Mobility Performed: Yes  Functional Mobility 1  Surface 1: Level tile  Device 1: Rolling walker  Assistance 1: Contact guard  Comments 1: L lateral side-steps at EOB.  Bed Mobility/Transfers:   Bed Mobility  Bed Mobility: Yes  Bed Mobility 1  Bed Mobility 1: Sitting to supine  Level of Assistance 1: Minimum assistance  Bed Mobility Comments 1: HOB elevated, cued to utilize bed rail. Assist at trunk.  Bed Mobility 2  Bed Mobility  2: Sitting to supine  Level of Assistance 2: Contact guard, Minimal verbal cues  Bed Mobility 3  Bed Mobility 3: Scooting  Level of Assistance 3: Close supervision  Bed Mobility  Comments 3: up in bed, cued for technique    Transfers  Transfer: Yes  Transfer 1  Transfer From 1: Bed to  Transfer to 1: Stand  Technique 1: Sit to stand  Transfer Device 1: Walker  Transfer Level of Assistance 1: Contact guard, Minimal verbal cues  Trials/Comments 1: cued for hand placement and safe walker use.  Transfers 2  Transfer From 2: Stand to  Transfer to 2: Bed  Technique 2: Stand to sit  Transfer Device 2: Walker  Transfer Level of Assistance 2: Contact guard, Minimal verbal cues  Trials/Comments 2: cued for hand placement    Balance:  Dynamic Sitting Balance  Dynamic Sitting-Balance Support: No upper extremity supported, Feet supported  Dynamic Sitting-Level of Assistance: Close supervision, Contact guard  Dynamic Sitting-Balance: Forward lean, Reaching for objects  Dynamic Standing Balance  Dynamic Standing-Balance Support: Left upper extremity supported  Dynamic Standing-Level of Assistance: Contact guard  Dynamic Standing-Balance: Reaching for objects  Static Standing Balance  Static Standing-Balance Support: Bilateral upper extremity supported  Static Standing-Level of Assistance: Contact guard      Outcome Measures:  [facility] Daily Activity  Putting on and taking off regular lower body clothing: A lot  Bathing (including washing, rinsing, drying): A lot  Putting on and taking off regular upper body clothing: A lot  Toileting, which includes using toilet, bedpan or urinal: A lot  Taking care of personal grooming such as brushing teeth: A little  Eating Meals: A little  Daily Activity - Total Score: 14  Brief Confusion Assessment Method (bCAM)  CAM Result: CAM -     Education Documentation  Precautions, taught by Gabriella Villareal OT at 10/2/2024  3:50 PM.  Learner: Patient  Readiness: Acceptance  Method: Explanation, Demonstration  Response: Needs Reinforcement    Body Mechanics, taught by Gabriella Villareal OT at 10/2/2024  3:50 PM.  Learner: Patient  Readiness: Acceptance  Method: Explanation,  Demonstration  Response: Needs Reinforcement    ADL Training, taught by Gabriella Villareal OT at 10/2/2024  3:50 PM.  Learner: Patient  Readiness: Acceptance  Method: Explanation, Demonstration  Response: Needs Reinforcement    Education Comments  No comments found.        Goals:  Encounter Problems       Encounter Problems (Active)       ADLs       Patient with complete upper body dressing with independent level of assistance donning and doffing all UE clothes with PRN adaptive equipment while edge of bed. (Progressing)       Start:  09/27/24    Expected End:  10/18/24            Patient with complete lower body dressing with modified independent level of assistance donning and doffing all LE clothes  with PRN adaptive equipment while edge of bed. (Progressing)       Start:  09/27/24    Expected End:  10/18/24            Patient will complete daily grooming tasks brushing teeth and washing face/hair with modified independent level of assistance and PRN adaptive equipment while standing. (Progressing)       Start:  09/27/24    Expected End:  10/18/24               MOBILITY       Patient will perform Functional mobility max Household distances/Community Distances with modified independent level of assistance and least restrictive device in order to improve safety and functional mobility. (Progressing)       Start:  09/27/24    Expected End:  10/18/24               TRANSFERS       Patient will complete sit to stand transfer with modified independent level of assistance and least restrictive device in order to improve safety and prepare for out of bed mobility. (Progressing)       Start:  09/27/24    Expected End:  10/18/24                     10/02/24 at 3:51 PM   Gabriella Villareal OT   [phone]

## 2024-10-02 NOTE — PROGRESS NOTES
"Seven Robin is a 74 y.o. male on day 8 of admission presenting with Fall, initial encounter.    Subjective   Standing up in the room initially, then sitting in a chair at bedside.  No distress.  Patient reports pain in head and back area still around 5/10.  No other complaints.    Objective     General: Standing up at bedside without distress.  Appears a little unsteady.  Cooperative.  Skin: Laceration on scalp, currently covered in dressing.  HEENT: Sclera is white.  Mucous membranes moist.  Neck: Supple.  No JVD.  Cardiac: Regular rate and rhythm, S1/S2 normal.  Lungs: Clear to auscultation bilaterally, no wheezing or crackles, no accessory muscle use at rest.  Abdomen: Soft, nontender, nondistended, BS +  Extremities: No cyanosis.  No lower extremity edema.  Neurologic: Alert and oriented x3.  No focal deficits.  Psychiatric: Appropriate mood and behavior.  Currently no agitation.    Last Recorded Vitals  Blood pressure 137/80, pulse 69, temperature 36 °C (96.8 °F), resp. rate 16, height 1.803 m (5' 11\"), weight 94.3 kg (208 lb), SpO2 98%.  On room air.    Intake/Output last 3 Shifts:  I/O last 3 completed shifts:  In: 400 (4.2 mL/kg) [IV Piggyback:400]  Out: 3400 (36 mL/kg) [Urine:3400 (1 mL/kg/hr)]  Weight: 94.3 kg     Relevant Results  [Held by provider] aspirin, 81 mg, oral, Daily  [Held by provider] enoxaparin, 40 mg, subcutaneous, q24h  insulin lispro, 0-5 Units, subcutaneous, TID  lidocaine, 1 patch, transdermal, Daily  losartan, 25 mg, oral, Daily  melatonin, 10 mg, oral, Nightly  piperacillin-tazobactam, 4.5 g, intravenous, q6h  polyethylene glycol, 17 g, oral, Daily  risperiDONE, 2.5 mg, oral, Daily  sennosides-docusate sodium, 2 tablet, oral, BID  tamsulosin, 0.4 mg, oral, Daily  traZODone, 100 mg, oral, Nightly           PRN medications: acetaminophen, dextrose, dextrose, glucagon, glucagon, oxyCODONE     Results for orders placed or performed during the hospital encounter of 09/24/24 (from the " past 24 hour(s))   Fungal Culture/Smear    Specimen: ABSCESS; Tissue   Result Value Ref Range    Fungal Culture/Smear       Culture in progress, a report will be issued when positive or after 2 weeks of incubation.    Fungal Smear No fungal elements seen    Tissue/Wound Culture/Smear    Specimen: ABSCESS; Tissue   Result Value Ref Range    Tissue/Wound Culture/Smear (1+) Rare Mixed Gram-Negative Bacteria    POCT GLUCOSE   Result Value Ref Range    POCT Glucose 136 (H) 74 - 99 mg/dL   Electrocardiogram, 12-lead   Result Value Ref Range    Ventricular Rate 82 BPM    Atrial Rate 82 BPM    MN Interval 132 ms    QRS Duration 98 ms    QT Interval 374 ms    QTC Calculation(Bazett) 436 ms    P Axis 53 degrees    R Axis 27 degrees    T Axis 49 degrees    QRS Count 14 beats    Q Onset 223 ms    P Onset 157 ms    P Offset 207 ms    T Offset 410 ms    QTC Fredericia 415 ms   Blood Culture    Specimen: Peripheral Venipuncture; Blood culture   Result Value Ref Range    Blood Culture Loaded on Instrument - Culture in progress    POCT GLUCOSE   Result Value Ref Range    POCT Glucose 133 (H) 74 - 99 mg/dL      Hospital Course:  Seven Robin is a 74 y.o. male with PMHx of HTN, HLD, DM, cirrhosis 2/2 treated Hep C (Tx 5775-2709), BPH, schizoaffective disorder and prostate CA s/p EBRT presenting to the ED s/p mechanical fall leading to trauma activation.  Patient's power had gone out and he was checking out his breaker box and was standing on a table when he lost his balance and fell backwards.  Patient sustained a scalp laceration, imaging negative for fractures bleed patient noted to have lactic acidosis and rhabdomyolysis.  With IV fluids rhabdomyolysis has significantly improved and almost resolved.  MRI obtained due to facial asymmetry, speech changes, vision change more likely related to facial trauma and swelling, showed 3mm SDH hematoma, now improving and do not appear to have neuro deficits. Neurosurg consulted, appears to  be seen on CT but not in report, stable. Holding asa and dvt px also in setting of scalp hematoma/pressure injury. Plastics and ent consulted, s/p debridement of scalp on 10/1.  Blood cultures from 9/29 2/4 bottles growing gram-negative bacilli, eventually grew out Enterobacter cloacae.    Assessment/Plan     Mechanical Fall  Large posterior scalp unstagable pressure injury/eschar/hematoma  SDH, 3mm read as subacute to chronic  -no bleed or fractures identified, trauma team evaluated and signed off.  -MRI obtained due to facial asymmetry, speech changes, vision change more likely related to facial trauma and swelling, showed 3mm SDH hematoma, now improving and do not appear to have been neuro deficits. Neurosurg consulted, appears to be seen on CT but not in report, stable. Holding asa and dvt px also in setting of scalp hematoma/pressure injury.    Plastics and ent consulted, s/p debridement of scalp on 10/1. Nutrition consulted for healing, continuing offloading and wound care per plastics.   -Neurosurgery had seen for subdural hematoma, recommended to hold aspirin until post bleed day #5 and to hold any prophylaxis until post bleed day #2.  Looking at his medical history it is not clear that aspirin was given for a specific medical condition, may have been for preventative treatment.  Will continue to hold for now.  -Wound cultures collected on 9/27. Prelim  mixed Gram-Positive and Gram-Negative Bacteria  -pt/ot recommending moderate intensity.  TCC aware.  -Plastic surgery following.  Plastic surgery indicates preliminary intraoperative cultures appears to be growing Enterobacter cloacae.  Plastic surgery recommending return to the OR in 2 to 3 weeks for skin graft. Offload pressure off of occipital wound.  -Patient is still having pain.  Patient can only use a limited amount of Tylenol due to history of cirrhosis.  Start IV Toradol 15 mg every 6 hours as needed for moderate pain.  Oxycodone 5 mg every 4 hours  as needed for severe pain.     Gram negative Bacteremia  -Continue IV Zosyn.    -Blood cultures x2 growing G- bacilli, preliminary identified as Enterobacter cloacae.  Awaiting sensitivities.  -Repeat blood culture 10/1 no growth to date.  -Likely source of infection is the scalp laceration and injury, source control done with debridement 10/1.     Rhabdomyolysis   -CK elevated to 3,593, since then with IV fluids CPK has improved down to 377.  -Resolved.     Hyponatremia  -Likely secondary to fluids  -Improved, last sodium level 135, only mildly low at this time.  Monitor as needed.     troponin elevation  -mild uptrend initially then downtrend  -no angina or equivalents  -low concern for acs, likely related to demand  -denies chest pain or equivalents     HTN   HLD  -On lovastatin, triamterene-hydroCHLOROthiazide 37.5-25 mg daily, and ASA 81mg   -Holding ASA 81 mg daily.  This appears to be mostly preventative treatment and in the setting of subdural hematoma would not resume for now.  -Holding statin iso c/f rhabo.  Can probably resume on discharge.  -Systolic blood pressure 130s after morning medication.  Monitor.     DM type 2  -A1c: 6.5  -Holding home Metformin 500mg BID   -Continue SSI   -Currently fasting and postprandial glucose appears controlled.     Schizoaffective disorder  MDD   insomnia   -On propanolol 10 mg, Risperidone 2.5 mg tablet and trazodone 100 mg tablet   -C/w Risperidone 2.5 mg and Trazodone 100mg      BPH  -C/w Tamsulosin     Back pain  -Lidocaine patch.    Disposition: Monitor blood culture for clearance, working on placement to skilled nursing facility.    Riri Huitron MD

## 2024-10-02 NOTE — RESEARCH NOTES
Artificial Intelligence Monitoring in Nursing (AIMS Nursing) Study    Principle Investigator - Dr. Brent Madden  Research Coordinator - Heavenly Patel     Patient Name - Seven Robin  Date - 10/2/2024 1:20 PM  Location - [place] [address]    Seven Robin was approached by Heavenly Patel to talk about participating in the AIMS Nursing Study. The patient was not able to be approached, a research coordinator will come back at a later time. Study protocol was followed and patient was given study contact information.     Heavenly Patel

## 2024-10-02 NOTE — PROGRESS NOTES
"  Department of Plastic and Reconstructive Surgery  Daily Progress/Post Op Note    Patient Name: Seven Robin  MRN: [id number]  Date:  10/02/24     Subjective  Patient s/p scalp wound debridement  (10/1). Wound covered intra-op with Integra, No acute issues ovn. Pain is well controlled. Dressing is in place with no issues. Denies any CP, SOB, nausea, vomiting, or abdominal pain/discomfort.    Objective    Vital Signs  BP (!) 168/94   Pulse 72   Temp 36.9 °C (98.4 °F)   Resp 16   Ht 1.803 m (5' 11\")   Wt 94.3 kg (208 lb)   SpO2 98%   BMI 29.01 kg/m²      Physical Exam   Constitutional: Calm and cooperative, NAD.  Eyes: PERRL, EOMI  ENMT: Moist mucous membranes, no apparent injuries or lesions. Small lacerations on left lower lip and left side of chin, appear to be healing, scabbing on chin wounds, mild lower lip edema.  Head/Neck: Occipital scalp wound covered with ACE dressing.  Cardiovascular: Normal rate and regular rhythm. 2+ equal pulses of the distal extremities.  Respiratory/Thorax: Regular respirations on 3 L NC. Good symmetric chest expansion.   Gastrointestinal: Abdomen soft, NTND  Genitourinary: Voiding independently via urinal, clear yellow output  Extremities: Generalized weakness, 1+ edema to BLE and BUE, abrasions to left elbow and shoulder, knees  Neurological: A&Ox3.   Psychological: Appropriate mood and behavior.    Diagnostics   Results for orders placed or performed during the hospital encounter of 09/24/24 (from the past 24 hour(s))   CBC and Auto Differential   Result Value Ref Range    WBC 4.4 4.4 - 11.3 x10*3/uL    nRBC 0.0 0.0 - 0.0 /100 WBCs    RBC 4.66 4.50 - 5.90 x10*6/uL    Hemoglobin 13.1 (L) 13.5 - 17.5 g/dL    Hematocrit 39.7 (L) 41.0 - 52.0 %    MCV 85 80 - 100 fL    MCH 28.1 26.0 - 34.0 pg    MCHC 33.0 32.0 - 36.0 g/dL    RDW 13.6 11.5 - 14.5 %    Platelets 195 150 - 450 x10*3/uL    Neutrophils % 57.3 40.0 - 80.0 %    Immature Granulocytes %, Automated 0.7 0.0 - 0.9 %    " Lymphocytes % 17.6 13.0 - 44.0 %    Monocytes % 20.7 2.0 - 10.0 %    Eosinophils % 3.2 0.0 - 6.0 %    Basophils % 0.5 0.0 - 2.0 %    Neutrophils Absolute 2.55 1.60 - 5.50 x10*3/uL    Immature Granulocytes Absolute, Automated 0.03 0.00 - 0.50 x10*3/uL    Lymphocytes Absolute 0.78 (L) 0.80 - 3.00 x10*3/uL    Monocytes Absolute 0.92 (H) 0.05 - 0.80 x10*3/uL    Eosinophils Absolute 0.14 0.00 - 0.40 x10*3/uL    Basophils Absolute 0.02 0.00 - 0.10 x10*3/uL   Renal Function Panel   Result Value Ref Range    Glucose 129 (H) 74 - 99 mg/dL    Sodium 135 (L) 136 - 145 mmol/L    Potassium 3.8 3.5 - 5.3 mmol/L    Chloride 101 98 - 107 mmol/L    Bicarbonate 24 21 - 32 mmol/L    Anion Gap 14 10 - 20 mmol/L    Urea Nitrogen 10 6 - 23 mg/dL    Creatinine 0.95 0.50 - 1.30 mg/dL    eGFR 84 >60 mL/min/1.73m*2    Calcium 8.6 8.6 - 10.6 mg/dL    Phosphorus 3.8 2.5 - 4.9 mg/dL    Albumin 3.2 (L) 3.4 - 5.0 g/dL   Magnesium   Result Value Ref Range    Magnesium 2.08 1.60 - 2.40 mg/dL   Creatine Kinase   Result Value Ref Range    Creatine Kinase 377 (H) 0 - 325 U/L   Vancomycin   Result Value Ref Range    Vancomycin 12.0 5.0 - 20.0 ug/mL   Type and Screen   Result Value Ref Range    ABO TYPE B     Rh TYPE POS     ANTIBODY SCREEN NEG    POCT GLUCOSE   Result Value Ref Range    POCT Glucose 126 (H) 74 - 99 mg/dL   POCT GLUCOSE   Result Value Ref Range    POCT Glucose 136 (H) 74 - 99 mg/dL   Electrocardiogram, 12-lead   Result Value Ref Range    Ventricular Rate 82 BPM    Atrial Rate 82 BPM    AZ Interval 132 ms    QRS Duration 98 ms    QT Interval 374 ms    QTC Calculation(Bazett) 436 ms    P Axis 53 degrees    R Axis 27 degrees    T Axis 49 degrees    QRS Count 14 beats    Q Onset 223 ms    P Onset 157 ms    P Offset 207 ms    T Offset 410 ms    QTC Fredericia 415 ms   Blood Culture    Specimen: Peripheral Venipuncture; Blood culture   Result Value Ref Range    Blood Culture Loaded on Instrument - Culture in progress      MR brain wo IV  contrast    Result Date: 9/27/2024  Interpreted By:  Giovanni Valerio,  and Karolyn Cortes Ubaldo STUDY: MR BRAIN WO IV CONTRAST;  9/26/2024 8:00 pm   INDICATION: Signs/Symptoms:left facial drop after fall with significant head injury, >24 hrs. pt reports longstanding v. since intial injury.   COMPARISON: CT head 09/24/2024   ACCESSION NUMBER(S): [id number]   ORDERING CLINICIAN: YOU FRANCOIS   TECHNIQUE: Axial T2, FLAIR, DWI, gradient echo T2 and sagittal and coronal T1 weighted images of the brain were acquired.   FINDINGS: There is no diffusion restriction to suggest acute infarction.   There is subtle FLAIR hyperintense, T1 isointense subdural collection present along the medial aspect of the right occipital lobe (series 3, image 20) with blooming artifact present in the dark gradient echo images (series 6, image 20) with asymmetric T1 isointense thickening of the right tentorium, suggestive of layering acute subdural hematoma between the right occipital lobe and the tentorium (series 7, image 34)   There is also a T2 hyperintense, T1 isointense extra-axial collection overlying the right frontal region measuring up to 3 mm in maximal thickness, essentially unchanged in appearance to prior CT head. There may be slight mass effect on the adjacent brain parenchyma, with a 2-3 mm rightward shift of midline structures at the septum pellucidum.   The ventricles and sulci prominent likely due to diffuse parenchymal volume loss. No new ventricular dilatation is identified. Basal cisterns are patent.   There are extensive periventricular and subcortical T2/FLAIR hyperintensities likely represent sequela of small-vessel ischemic changes.   Fluid and edema are again present in the scalp soft tissues bilaterally, perhaps slightly improved compared to prior exam on 09/24/2024.   Visualized large arterial flow voids, including intracranial carotid and basilar artery are preserved.   No abnormal fluid signal is  present in the paranasal sinuses or mastoid air cells.       1. No diffusion restriction abnormality is present to suggest an acute infarct. 2. A FLAIR hyperintense, T1 isointense subdural hematoma is layering between the right occipital lobe in the tentorium, measuring between 2-3 mm in thickness, with minimal mass effect on the adjacent brain parenchyma, new/increased in size from prior CT head dated 09/24/2024. 3. A subtle T2 hyperintense collection overlies the right frontal lobe, measuring up to 3 mm in maximal thickness, with minimal if any mass effect on the adjacent brain parenchyma, and a 2-3 mm leftward shift of midline structures. Collection demonstrates no associated FLAIR hyperintensity or blooming on gradient echo and may represent a more chronic hygroma. 4. Moderate diffuse brain parenchymal volume loss with patchy and confluence areas of hyperintense FLAIR and T2 signal are present in the periventricular and subcortical white matter of bilateral cerebral hemispheres, nonspecific findings favored to represent sequela of microvascular disease. 5. Scalp swelling and edema slightly improved from prior CT dated 09/24/2024.   I personally reviewed the images/study and I agree with the findings as stated by resident physician Dr. Ubaldo Cortes . This study was interpreted at [facility], [place], [place].   MACRO: None   Signed by: Giovanni Valerio 9/27/2024 1:34 AM Dictation workstation:   [id number]      Current Medications  Scheduled medications  [Held by provider] aspirin, 81 mg, oral, Daily  [Held by provider] enoxaparin, 40 mg, subcutaneous, q24h  insulin lispro, 0-5 Units, subcutaneous, TID  losartan, 25 mg, oral, Daily  melatonin, 10 mg, oral, Nightly  piperacillin-tazobactam, 4.5 g, intravenous, q6h  polyethylene glycol, 17 g, oral, Daily  risperiDONE, 2.5 mg, oral, Daily  sennosides-docusate sodium, 2 tablet, oral, BID  tamsulosin, 0.4 mg, oral,  Daily  traZODone, 100 mg, oral, Nightly      Continuous medications       PRN medications  PRN medications: acetaminophen, dextrose, dextrose, glucagon, glucagon     Assessment  Seven Robin is a 74 y.o. male who presented to [facility] on [facility]24 as a full trauma activation for mechanical fall with head strike and deformity of the skull after falling from table and remaining on the ground for approximately 12 hours. Reports he was reaching for something and was standing on a table when he fell backwards. His roommate found him in the morning. Reports no loss of consciousness and can recall the fall.  Extensive imaging in the ED with no acute findings aside from scalp wound.  He has remained alert and oriented x 3 with no focal deficits for the duration of his hospitalization.  Had lactic acidosis and rhabdo, improving s/p fluids. Occipital wound with necrosis, wound care was consulted and recommended daily saline irrigation and xeroform dressing with Mepilex border dressing. Plastic Surgery was consulted for management of occipital wound.     Plan/Recommendations  S/p Scalp wound debridement 10/1  - Pain control per primary team  - Please inform plastics surgery if dressing is removed or if bleeding through dressing  - Continue antibiotics per primary and follow up intraoperative cultures from wound  · Enterobacter cloacae complex (preliminary)  - Offload pressure off of occipital wound  - Recommend nutrition optimization for wound healing, albumin and prealbumin monitoring, nutrition supplements Q meal  - Recommend tight glycemic control  - plans for return to OR in 2-3 weeks for skin graft.   - Remainder of care per primary team    Patient and plan discussed with Dr. Danielle.     Kg Etienne MD  Plastic and Reconstructive Surgery   [place]  Pager #[phone]  Team phone: [phone]

## 2024-10-02 NOTE — CARE PLAN
The patient's goals for the shift include have decreased pain    The clinical goals for the shift include Pt will remain safe during shift.    Over the shift, the patient did not make progress toward the following goals. Barriers to progression include   Problem: Pain - Adult  Goal: Verbalizes/displays adequate comfort level or baseline comfort level  Outcome: Progressing   . Recommendations to address these barriers include   Problem: Safety - Adult  Goal: Free from fall injury  Outcome: Progressing   .

## 2024-10-02 NOTE — PROGRESS NOTES
Seven Robin is a 74 y.o. male on day 8 of admission presenting with Fall, initial encounter.    JOHN met with pt at bedside and agreed to have his brother, Danny, consulted for dc planning. JOHN left a list of additional facilities with pt as well.     JOHN called pt's brother, Danny, and explained that the SNF choices given previously were not able to accept because of bed availability. JOHN advised him to pick additional choices for her to send referrals to. Ashwinfrannie agreed and asked for the list to be sent again to his email. JOHN emailed this list to Danny and told him to call her back when he decides.    SW to follow.    Fany Perez [facility]  Care Transitions  2  [phone] or Epic Secure Chat

## 2024-10-03 LAB
ALBUMIN SERPL BCP-MCNC: 3.2 G/DL (ref 3.4–5)
ALBUMIN SERPL BCP-MCNC: 3.2 G/DL (ref 3.4–5)
ANION GAP SERPL CALC-SCNC: 13 MMOL/L (ref 10–20)
ANION GAP SERPL CALC-SCNC: 13 MMOL/L (ref 10–20)
BACTERIA SPEC CULT: NORMAL
BACTERIA SPEC CULT: NORMAL
BASOPHILS # BLD AUTO: 0.03 X10*3/UL (ref 0–0.1)
BASOPHILS # BLD AUTO: 0.03 X10*3/UL (ref 0–0.1)
BASOPHILS NFR BLD AUTO: 0.8 %
BASOPHILS NFR BLD AUTO: 0.8 %
BUN SERPL-MCNC: 16 MG/DL (ref 6–23)
BUN SERPL-MCNC: 16 MG/DL (ref 6–23)
CALCIUM SERPL-MCNC: 8.8 MG/DL (ref 8.6–10.6)
CALCIUM SERPL-MCNC: 8.8 MG/DL (ref 8.6–10.6)
CHLORIDE SERPL-SCNC: 101 MMOL/L (ref 98–107)
CHLORIDE SERPL-SCNC: 101 MMOL/L (ref 98–107)
CK SERPL-CCNC: 159 U/L (ref 0–325)
CK SERPL-CCNC: 159 U/L (ref 0–325)
CO2 SERPL-SCNC: 25 MMOL/L (ref 21–32)
CO2 SERPL-SCNC: 25 MMOL/L (ref 21–32)
CREAT SERPL-MCNC: 0.99 MG/DL (ref 0.5–1.3)
CREAT SERPL-MCNC: 0.99 MG/DL (ref 0.5–1.3)
EGFRCR SERPLBLD CKD-EPI 2021: 80 ML/MIN/1.73M*2
EGFRCR SERPLBLD CKD-EPI 2021: 80 ML/MIN/1.73M*2
EOSINOPHIL # BLD AUTO: 0.2 X10*3/UL (ref 0–0.4)
EOSINOPHIL # BLD AUTO: 0.2 X10*3/UL (ref 0–0.4)
EOSINOPHIL NFR BLD AUTO: 5.6 %
EOSINOPHIL NFR BLD AUTO: 5.6 %
ERYTHROCYTE [DISTWIDTH] IN BLOOD BY AUTOMATED COUNT: 14 % (ref 11.5–14.5)
ERYTHROCYTE [DISTWIDTH] IN BLOOD BY AUTOMATED COUNT: 14 % (ref 11.5–14.5)
GLUCOSE BLD MANUAL STRIP-MCNC: 110 MG/DL (ref 74–99)
GLUCOSE BLD MANUAL STRIP-MCNC: 110 MG/DL (ref 74–99)
GLUCOSE BLD MANUAL STRIP-MCNC: 113 MG/DL (ref 74–99)
GLUCOSE BLD MANUAL STRIP-MCNC: 113 MG/DL (ref 74–99)
GLUCOSE BLD MANUAL STRIP-MCNC: 123 MG/DL (ref 74–99)
GLUCOSE BLD MANUAL STRIP-MCNC: 123 MG/DL (ref 74–99)
GLUCOSE BLD MANUAL STRIP-MCNC: 186 MG/DL (ref 74–99)
GLUCOSE BLD MANUAL STRIP-MCNC: 186 MG/DL (ref 74–99)
GLUCOSE SERPL-MCNC: 120 MG/DL (ref 74–99)
GLUCOSE SERPL-MCNC: 120 MG/DL (ref 74–99)
GRAM STN SPEC: NORMAL
HCT VFR BLD AUTO: 33.9 % (ref 41–52)
HCT VFR BLD AUTO: 33.9 % (ref 41–52)
HGB BLD-MCNC: 10.7 G/DL (ref 13.5–17.5)
HGB BLD-MCNC: 10.7 G/DL (ref 13.5–17.5)
IMM GRANULOCYTES # BLD AUTO: 0.04 X10*3/UL (ref 0–0.5)
IMM GRANULOCYTES # BLD AUTO: 0.04 X10*3/UL (ref 0–0.5)
IMM GRANULOCYTES NFR BLD AUTO: 1.1 % (ref 0–0.9)
IMM GRANULOCYTES NFR BLD AUTO: 1.1 % (ref 0–0.9)
LYMPHOCYTES # BLD AUTO: 1.17 X10*3/UL (ref 0.8–3)
LYMPHOCYTES # BLD AUTO: 1.17 X10*3/UL (ref 0.8–3)
LYMPHOCYTES NFR BLD AUTO: 33.1 %
LYMPHOCYTES NFR BLD AUTO: 33.1 %
MAGNESIUM SERPL-MCNC: 2.48 MG/DL (ref 1.6–2.4)
MAGNESIUM SERPL-MCNC: 2.48 MG/DL (ref 1.6–2.4)
MCH RBC QN AUTO: 28.2 PG (ref 26–34)
MCH RBC QN AUTO: 28.2 PG (ref 26–34)
MCHC RBC AUTO-ENTMCNC: 31.6 G/DL (ref 32–36)
MCHC RBC AUTO-ENTMCNC: 31.6 G/DL (ref 32–36)
MCV RBC AUTO: 89 FL (ref 80–100)
MCV RBC AUTO: 89 FL (ref 80–100)
MONOCYTES # BLD AUTO: 0.46 X10*3/UL (ref 0.05–0.8)
MONOCYTES # BLD AUTO: 0.46 X10*3/UL (ref 0.05–0.8)
MONOCYTES NFR BLD AUTO: 13 %
MONOCYTES NFR BLD AUTO: 13 %
NEUTROPHILS # BLD AUTO: 1.64 X10*3/UL (ref 1.6–5.5)
NEUTROPHILS # BLD AUTO: 1.64 X10*3/UL (ref 1.6–5.5)
NEUTROPHILS NFR BLD AUTO: 46.4 %
NEUTROPHILS NFR BLD AUTO: 46.4 %
NRBC BLD-RTO: 0 /100 WBCS (ref 0–0)
NRBC BLD-RTO: 0 /100 WBCS (ref 0–0)
PHOSPHATE SERPL-MCNC: 4.1 MG/DL (ref 2.5–4.9)
PHOSPHATE SERPL-MCNC: 4.1 MG/DL (ref 2.5–4.9)
PLATELET # BLD AUTO: 216 X10*3/UL (ref 150–450)
PLATELET # BLD AUTO: 216 X10*3/UL (ref 150–450)
POTASSIUM SERPL-SCNC: 3.7 MMOL/L (ref 3.5–5.3)
POTASSIUM SERPL-SCNC: 3.7 MMOL/L (ref 3.5–5.3)
RBC # BLD AUTO: 3.79 X10*6/UL (ref 4.5–5.9)
RBC # BLD AUTO: 3.79 X10*6/UL (ref 4.5–5.9)
SODIUM SERPL-SCNC: 135 MMOL/L (ref 136–145)
SODIUM SERPL-SCNC: 135 MMOL/L (ref 136–145)
VANCOMYCIN SERPL-MCNC: <2 UG/ML (ref 5–20)
VANCOMYCIN SERPL-MCNC: <2 UG/ML (ref 5–20)
WBC # BLD AUTO: 3.5 X10*3/UL (ref 4.4–11.3)
WBC # BLD AUTO: 3.5 X10*3/UL (ref 4.4–11.3)

## 2024-10-03 PROCEDURE — 1210000001 HC SEMI-PRIVATE ROOM DAILY

## 2024-10-03 PROCEDURE — 2500000002 HC RX 250 W HCPCS SELF ADMINISTERED DRUGS (ALT 637 FOR MEDICARE OP, ALT 636 FOR OP/ED): Performed by: INTERNAL MEDICINE

## 2024-10-03 PROCEDURE — 80069 RENAL FUNCTION PANEL: CPT | Performed by: INTERNAL MEDICINE

## 2024-10-03 PROCEDURE — 82947 ASSAY GLUCOSE BLOOD QUANT: CPT

## 2024-10-03 PROCEDURE — 36415 COLL VENOUS BLD VENIPUNCTURE: CPT | Performed by: INTERNAL MEDICINE

## 2024-10-03 PROCEDURE — 2500000001 HC RX 250 WO HCPCS SELF ADMINISTERED DRUGS (ALT 637 FOR MEDICARE OP): Performed by: INTERNAL MEDICINE

## 2024-10-03 PROCEDURE — 2500000004 HC RX 250 GENERAL PHARMACY W/ HCPCS (ALT 636 FOR OP/ED): Performed by: INTERNAL MEDICINE

## 2024-10-03 PROCEDURE — 83735 ASSAY OF MAGNESIUM: CPT | Performed by: INTERNAL MEDICINE

## 2024-10-03 PROCEDURE — 85025 COMPLETE CBC W/AUTO DIFF WBC: CPT | Performed by: INTERNAL MEDICINE

## 2024-10-03 PROCEDURE — 99232 SBSQ HOSP IP/OBS MODERATE 35: CPT | Performed by: INTERNAL MEDICINE

## 2024-10-03 PROCEDURE — 99232 SBSQ HOSP IP/OBS MODERATE 35: CPT

## 2024-10-03 PROCEDURE — 97530 THERAPEUTIC ACTIVITIES: CPT | Mod: GP

## 2024-10-03 PROCEDURE — 80202 ASSAY OF VANCOMYCIN: CPT | Performed by: INTERNAL MEDICINE

## 2024-10-03 PROCEDURE — 82550 ASSAY OF CK (CPK): CPT | Performed by: INTERNAL MEDICINE

## 2024-10-03 PROCEDURE — 2500000005 HC RX 250 GENERAL PHARMACY W/O HCPCS: Performed by: INTERNAL MEDICINE

## 2024-10-03 PROCEDURE — 97110 THERAPEUTIC EXERCISES: CPT | Mod: GP

## 2024-10-03 ASSESSMENT — COGNITIVE AND FUNCTIONAL STATUS - GENERAL
WALKING IN HOSPITAL ROOM: A LITTLE
WALKING IN HOSPITAL ROOM: A LITTLE
TURNING FROM BACK TO SIDE WHILE IN FLAT BAD: A LITTLE
TURNING FROM BACK TO SIDE WHILE IN FLAT BAD: A LOT
STANDING UP FROM CHAIR USING ARMS: A LITTLE
MOVING TO AND FROM BED TO CHAIR: A LITTLE
TOILETING: A LITTLE
DRESSING REGULAR UPPER BODY CLOTHING: A LITTLE
DRESSING REGULAR LOWER BODY CLOTHING: A LITTLE
DAILY ACTIVITIY SCORE: 20
MOVING FROM LYING ON BACK TO SITTING ON SIDE OF FLAT BED WITH BEDRAILS: A LITTLE
MOBILITY SCORE: 16
HELP NEEDED FOR BATHING: A LITTLE
MOBILITY SCORE: 17
STANDING UP FROM CHAIR USING ARMS: A LITTLE
MOVING TO AND FROM BED TO CHAIR: A LITTLE
MOVING FROM LYING ON BACK TO SITTING ON SIDE OF FLAT BED WITH BEDRAILS: A LITTLE
CLIMB 3 TO 5 STEPS WITH RAILING: A LOT
CLIMB 3 TO 5 STEPS WITH RAILING: A LOT

## 2024-10-03 ASSESSMENT — PAIN SCALES - GENERAL
PAINLEVEL_OUTOF10: 7
PAINLEVEL_OUTOF10: 6
PAINLEVEL_OUTOF10: 0 - NO PAIN
PAINLEVEL_OUTOF10: 5 - MODERATE PAIN
PAINLEVEL_OUTOF10: 0 - NO PAIN
PAINLEVEL_OUTOF10: 5 - MODERATE PAIN
PAINLEVEL_OUTOF10: 6

## 2024-10-03 ASSESSMENT — PAIN DESCRIPTION - ORIENTATION: ORIENTATION: LEFT

## 2024-10-03 ASSESSMENT — PAIN DESCRIPTION - LOCATION
LOCATION: HEAD
LOCATION: HEAD

## 2024-10-03 ASSESSMENT — PAIN - FUNCTIONAL ASSESSMENT
PAIN_FUNCTIONAL_ASSESSMENT: 0-10

## 2024-10-03 NOTE — PROGRESS NOTES
Physical Therapy    Physical Therapy Treatment    Patient Name: Seven Robin  MRN: [id number]  Department: [place] [address]  Room: 50/5071-A  Today's Date: 10/3/2024  Time Calculation  Start Time: 0921  Stop Time: 0946  Time Calculation (min): 25 min         Assessment/Plan   PT Assessment  PT Assessment Results: Decreased strength, Impaired balance, Decreased mobility, Decreased safety awareness, Pain  End of Session Communication: Bedside nurse  Assessment Comment: Pt demos balance and strength impairments, decreased safety awareness.  Will benefit from continued skilled PT to improve on mobility deficits.  End of Session Patient Position: Up in chair, Alarm on  PT Plan  Inpatient/Swing Bed or Outpatient: Inpatient  PT Plan  Treatment/Interventions: Bed mobility, Transfer training, Gait training, Stair training, Balance training, Strengthening, Endurance training, Therapeutic activity, Therapeutic exercise  PT Plan: Ongoing PT  PT Frequency: 3 times per week  PT Discharge Recommendations:  (At this time pt. has been recommended to discharge to a inpt. facility post acute - Will continue to assess the necessity.)  Equipment Recommended upon Discharge: Wheeled walker  PT Recommended Transfer Status: Assist x1  PT - OK to Discharge: Yes      General Visit Information:   PT  Visit  PT Received On: 10/03/24  General  Prior to Session Communication: Bedside nurse  General Comment: Pt agreeable to participate, has had scalp debridement since initial PT eval.  Requires assist for mobility at this time with ? safety awareness.  Will continue to follow.    Subjective   Precautions:  Precautions  Medical Precautions: Fall precautions    Objective   Pain:  Pain Assessment  Pain Assessment: 0-10  0-10 (Numeric) Pain Score: 6  Pain Location: Head  Pain Interventions:  (recently received meds)  Cognition:  Cognition  Following Commands: Follows one step commands with repetition  Insight: Moderate  Impulsive: Moderately     Postural  Control:  Static Sitting Balance  Static Sitting-Balance Support: Feet supported  Static Sitting-Level of Assistance: Close supervision  Static Standing Balance  Static Standing-Balance Support: Left upper extremity supported  Static Standing-Level of Assistance: Contact guard  Dynamic Standing Balance  Dynamic Standing-Balance Support: Left upper extremity supported  Dynamic Standing-Level of Assistance: Minimum assistance    Activity Tolerance:  Activity Tolerance  Endurance: Tolerates 10 - 20 min exercise with multiple rests  Treatments:  Therapeutic Exercise  Therapeutic Exercise Performed: Yes  Therapeutic Exercise Activity 1: (B) LE APs, LAQ x 15 reps    Therapeutic Activity  Therapeutic Activity Performed: Yes  Therapeutic Activity 1: sat to EOB ~10 min prior to mobility, demos (S) static sitting balance, CGA for dynamic therex.    Bed Mobility  Bed Mobility: Yes  Bed Mobility 1  Bed Mobility 1: Supine to sitting  Level of Assistance 1: Moderate assistance, Minimal verbal cues (x1)  Bed Mobility Comments 1: HOB raised slightly  Bed Mobility 2  Bed Mobility Comments 2: up to bedside chair at end of session    Ambulation/Gait Training  Ambulation/Gait Training Performed: Yes  Ambulation/Gait Training 1  Surface 1: Level tile  Device 1: No device  Assistance 1: Hand held assistance, Minimum assistance, Minimal verbal cues  Quality of Gait 1: Soft knee(s), Forward flexed posture, Decreased step length, Shuffling gait  Comments/Distance (ft) 1: 5 ft (deferred further gait activities due to floor very slippery, nursing and EVS aware)  Transfers  Transfer: Yes  Transfer 1  Transfer From 1: Bed to  Transfer to 1: Stand  Transfer Level of Assistance 1: Contact guard, Minimal verbal cues  Transfers 2  Transfer From 2: Stand to  Transfer to 2: Bed  Transfer Level of Assistance 2: Contact guard, Moderate verbal cues    Outcome Measures:  [facility] Basic Mobility  Turning from your back to your side while in a flat bed  without using bedrails: A little  Moving from lying on your back to sitting on the side of a flat bed without using bedrails: A lot  Moving to and from bed to chair (including a wheelchair): A little  Standing up from a chair using your arms (e.g. wheelchair or bedside chair): A little  To walk in hospital room: A little  Climbing 3-5 steps with railing: A lot  Basic Mobility - Total Score: 16    Education Documentation  Mobility Training, taught by Christi Zhao, PT at 10/3/2024 10:13 AM.  Learner: Patient  Readiness: Acceptance  Method: Explanation  Response: Verbalizes Understanding, Needs Reinforcement  Comment: safety with mobility tasks, need for assistance at this time    OP EDUCATION:       Encounter Problems       Encounter Problems (Active)       Balance       Patient will score >/=20/28 on Tinetti to promote safety and prevent falls (Progressing)       Start:  09/26/24    Expected End:  10/10/24               Mobility       Patient will ambulate 150 ft with SBA and LRAD (Progressing)       Start:  09/26/24    Expected End:  10/10/24            Patient will ascend/ descend 8 steps with CGA and LRAD (Progressing)       Start:  09/26/24    Expected End:  10/10/24               PT Transfers       Patient will be independent with bed mobility (Progressing)       Start:  09/26/24    Expected End:  10/10/24            Patient will be SBA with sit to stand with LRAD (Progressing)       Start:  09/26/24    Expected End:  10/10/24                 10/03/24 at 10:14 AM - Christi Zhao, PT

## 2024-10-03 NOTE — PROGRESS NOTES
"Seven Robin is a 74 y.o. male on day 9 of admission presenting with Fall, initial encounter.    Subjective   Patient is sitting in a chair at bedside.  Still reports that his head feels sore, describes the pain as moderate 5/10.  Reports that pain medication seems to be helping some.    Objective     General: Standing up at bedside without distress.  Appears a little unsteady.  Cooperative.  Skin: Laceration on scalp, currently covered in dressing.  HEENT: Sclera is white.  Mucous membranes moist.  Neck: Supple.  No JVD.  Cardiac: Regular rate and rhythm, S1/S2 normal.  Lungs: Clear to auscultation bilaterally, no wheezing or crackles, no accessory muscle use at rest.  Abdomen: Soft, nontender, nondistended, BS +  Extremities: No cyanosis.  No lower extremity edema.  Neurologic: Alert and oriented x3.  No focal deficits.  Psychiatric: Appropriate mood and behavior.  Currently no agitation.    Last Recorded Vitals  Blood pressure 126/61, pulse 59, temperature 36.4 °C (97.5 °F), resp. rate 20, height 1.803 m (5' 11\"), weight 94.3 kg (208 lb), SpO2 97%.  On room air.    Intake/Output last 3 Shifts:  I/O last 3 completed shifts:  In: - (0 mL/kg)   Out: 1150 (12.2 mL/kg) [Urine:1150 (0.3 mL/kg/hr)]  Weight: 94.3 kg     Relevant Results  [Held by provider] aspirin, 81 mg, oral, Daily  [Held by provider] enoxaparin, 40 mg, subcutaneous, q24h  insulin lispro, 0-5 Units, subcutaneous, TID  lidocaine, 1 patch, transdermal, Daily  losartan, 25 mg, oral, Daily  melatonin, 10 mg, oral, Nightly  polyethylene glycol, 17 g, oral, Daily  risperiDONE, 2.5 mg, oral, Daily  sennosides-docusate sodium, 2 tablet, oral, BID  sulfamethoxazole-trimethoprim, 2 tablet, oral, BID  tamsulosin, 0.4 mg, oral, Daily  traZODone, 100 mg, oral, Nightly           PRN medications: acetaminophen, dextrose, dextrose, glucagon, glucagon, ketorolac, oxyCODONE     Results for orders placed or performed during the hospital encounter of 09/24/24 (from the " past 24 hour(s))   POCT GLUCOSE   Result Value Ref Range    POCT Glucose 131 (H) 74 - 99 mg/dL   POCT GLUCOSE   Result Value Ref Range    POCT Glucose 134 (H) 74 - 99 mg/dL   POCT GLUCOSE   Result Value Ref Range    POCT Glucose 123 (H) 74 - 99 mg/dL   CBC and Auto Differential   Result Value Ref Range    WBC 3.5 (L) 4.4 - 11.3 x10*3/uL    nRBC 0.0 0.0 - 0.0 /100 WBCs    RBC 3.79 (L) 4.50 - 5.90 x10*6/uL    Hemoglobin 10.7 (L) 13.5 - 17.5 g/dL    Hematocrit 33.9 (L) 41.0 - 52.0 %    MCV 89 80 - 100 fL    MCH 28.2 26.0 - 34.0 pg    MCHC 31.6 (L) 32.0 - 36.0 g/dL    RDW 14.0 11.5 - 14.5 %    Platelets 216 150 - 450 x10*3/uL    Neutrophils % 46.4 40.0 - 80.0 %    Immature Granulocytes %, Automated 1.1 (H) 0.0 - 0.9 %    Lymphocytes % 33.1 13.0 - 44.0 %    Monocytes % 13.0 2.0 - 10.0 %    Eosinophils % 5.6 0.0 - 6.0 %    Basophils % 0.8 0.0 - 2.0 %    Neutrophils Absolute 1.64 1.60 - 5.50 x10*3/uL    Immature Granulocytes Absolute, Automated 0.04 0.00 - 0.50 x10*3/uL    Lymphocytes Absolute 1.17 0.80 - 3.00 x10*3/uL    Monocytes Absolute 0.46 0.05 - 0.80 x10*3/uL    Eosinophils Absolute 0.20 0.00 - 0.40 x10*3/uL    Basophils Absolute 0.03 0.00 - 0.10 x10*3/uL   Renal Function Panel   Result Value Ref Range    Glucose 120 (H) 74 - 99 mg/dL    Sodium 135 (L) 136 - 145 mmol/L    Potassium 3.7 3.5 - 5.3 mmol/L    Chloride 101 98 - 107 mmol/L    Bicarbonate 25 21 - 32 mmol/L    Anion Gap 13 10 - 20 mmol/L    Urea Nitrogen 16 6 - 23 mg/dL    Creatinine 0.99 0.50 - 1.30 mg/dL    eGFR 80 >60 mL/min/1.73m*2    Calcium 8.8 8.6 - 10.6 mg/dL    Phosphorus 4.1 2.5 - 4.9 mg/dL    Albumin 3.2 (L) 3.4 - 5.0 g/dL   Magnesium   Result Value Ref Range    Magnesium 2.48 (H) 1.60 - 2.40 mg/dL   Creatine Kinase   Result Value Ref Range    Creatine Kinase 159 0 - 325 U/L   Vancomycin   Result Value Ref Range    Vancomycin <2.0 (L) 5.0 - 20.0 ug/mL   POCT GLUCOSE   Result Value Ref Range    POCT Glucose 186 (H) 74 - 99 mg/dL      Hospital  Course:  Seven Robin is a 74 y.o. male with PMHx of HTN, HLD, DM, cirrhosis 2/2 treated Hep C (Tx 0171-5696), BPH, schizoaffective disorder and prostate CA s/p EBRT presenting to the ED s/p mechanical fall leading to trauma activation.  Patient's power had gone out and he was checking out his breaker box and was standing on a table when he lost his balance and fell backwards.  Patient sustained a scalp laceration, imaging negative for fractures bleed patient noted to have lactic acidosis and rhabdomyolysis.  With IV fluids rhabdomyolysis has significantly improved and almost resolved.  MRI obtained due to facial asymmetry, speech changes, vision change more likely related to facial trauma and swelling, showed 3mm SDH hematoma, now improving and do not appear to have neuro deficits. Neurosurg consulted, appears to be seen on CT but not in report, stable. Holding asa and dvt px also in setting of scalp hematoma/pressure injury. Plastics and ent consulted, s/p debridement of scalp on 10/1.  Blood cultures from 9/29 2/4 bottles growing gram-negative bacilli, eventually grew out Enterobacter cloacae.  Per plastic surgery preliminary growth from operative wound culture growing Enterobacter cloacae.    Assessment/Plan     Mechanical Fall  Large posterior scalp unstagable pressure injury/eschar/hematoma  SDH, 3mm read as subacute to chronic  -no bleed or fractures identified, trauma team evaluated and signed off.  -MRI obtained due to facial asymmetry, speech changes, vision change more likely related to facial trauma and swelling, showed 3mm SDH hematoma, now improving and do not appear to have been neuro deficits. Neurosurg consulted, appears to be seen on CT but not in report, stable. Holding asa and dvt px also in setting of scalp hematoma/pressure injury.    Plastics and ent consulted, s/p debridement of scalp on 10/1. Nutrition consulted for healing, continuing offloading and wound care per plastics.    -Neurosurgery had seen for subdural hematoma, recommended to hold aspirin until post bleed day #5 and to hold any prophylaxis until post bleed day #2.  Looking at his medical history it is not clear that aspirin was given for a specific medical condition, may have been for preventative treatment.  Will continue to hold for now.  -Wound cultures collected on 9/27. Prelim  mixed Gram-Positive and Gram-Negative Bacteria  -pt/ot recommending moderate intensity.  TCC aware.  -Plastic surgery following.  Plastic surgery indicates preliminary intraoperative cultures appears to be growing Enterobacter cloacae.  Plastic surgery recommending return to the OR in 2 to 3 weeks for skin graft. Offload pressure off of occipital wound. Current wound care recs: Non adherent telfa to be placed on the integra that is stapled to the wound. followed by fluffs, cover with ABD pad. Use kerlix wrap to secure dressing with chin strap technique and use ACE wrap as well to wrap around chin. Dressing to be changed three times a week until follow up with Dr. Danielle.   -Continue limited Tylenol as needed.  Continue IV Toradol 15 mg every 6 hours as needed for moderate pain.  Oxycodone 5 mg every 4 hours as needed for severe pain.     Gram negative Bacteremia  -Patient had been on IV Zosyn, switched to oral Bactrim DS 2 tablets twice a day.  On 10/2.  Plan for total 10 days from the date of debridement on 10/1.  -Blood cultures x2 growing G- bacilli, preliminary identified as Enterobacter cloacae.  Sensitivities reviewed.  -Repeat blood culture 10/1 no growth to date.  -Likely source of infection is the scalp laceration and injury, source control done with debridement 10/1.     Rhabdomyolysis   -CK elevated to 3,593, since then with IV fluids CPK has improved down to 377.  -Resolved.     Hyponatremia  -Likely secondary to fluids  -Improved, last sodium level 135, only mildly low at this time.  Monitor as needed.     troponin elevation  -mild  uptrend initially then downtrend  -no angina or equivalents  -low concern for acs, likely related to demand  -denies chest pain or equivalents     HTN   HLD  -On lovastatin, triamterene-hydroCHLOROthiazide 37.5-25 mg daily, and ASA 81mg   -Holding ASA 81 mg daily.  This appears to be mostly preventative treatment and in the setting of subdural hematoma would not resume for now.  -Holding statin iso c/f rhabo.  Can probably resume on discharge.  -Systolic blood pressure 100's - 120s.     DM type 2  -A1c: 6.5  -Holding home Metformin 500mg BID   -Continue SSI   -Currently fasting and postprandial glucose appears controlled.     Schizoaffective disorder  MDD   insomnia   -On propanolol 10 mg, Risperidone 2.5 mg tablet and trazodone 100 mg tablet   -C/w Risperidone 2.5 mg and Trazodone 100mg      BPH  -C/w Tamsulosin     Back pain  -Lidocaine patch.    Disposition: Monitor blood culture for clearance, working on placement to skilled nursing facility.    Riri Huitron MD

## 2024-10-03 NOTE — PROGRESS NOTES
"  Department of Plastic and Reconstructive Surgery  Daily Progress Note    Patient Name: Seven Robin  MRN: [id number]  Date:  10/03/24     Subjective  Patient found resting in bed. No acute concerns. Wound covered intra-op with Integra, now with telfa, ABD, kerlix, and ace wrap dressing. Pain is well controlled. Denies any CP, SOB, nausea, vomiting, or abdominal pain/discomfort.    Objective    Vital Signs  /61   Pulse 59   Temp 36.4 °C (97.5 °F)   Resp 20   Ht 1.803 m (5' 11\")   Wt 94.3 kg (208 lb)   SpO2 97%   BMI 29.01 kg/m²      Physical Exam   Constitutional: Calm and cooperative, NAD.  Eyes: PERRL, EOMI  ENMT: Moist mucous membranes, no apparent injuries or lesions. Small lacerations on left lower lip and left side of chin, appear to be healing, scabbing on chin wounds, mild lower lip edema.  Head/Neck: Occipital scalp wound with dressing removed on exam to reveal well approximated integra stapled in place. Serosanguinous drainage. Dressing replaced, patient tolerated well.   Cardiovascular: Normal rate and regular rhythm. 2+ equal pulses of the distal extremities.  Respiratory/Thorax: Regular respirations on NC. Good symmetric chest expansion.   Gastrointestinal: Abdomen soft, NTND  Genitourinary: Voiding independently via urinal  Extremities: Generalized weakness, 1+ edema to BLE and BUE, abrasions to left elbow and shoulder, knees  Neurological: A&Ox3.   Psychological: Appropriate mood and behavior.    Diagnostics   Results for orders placed or performed during the hospital encounter of 09/24/24 (from the past 24 hour(s))   POCT GLUCOSE   Result Value Ref Range    POCT Glucose 131 (H) 74 - 99 mg/dL   POCT GLUCOSE   Result Value Ref Range    POCT Glucose 134 (H) 74 - 99 mg/dL   POCT GLUCOSE   Result Value Ref Range    POCT Glucose 123 (H) 74 - 99 mg/dL   CBC and Auto Differential   Result Value Ref Range    WBC 3.5 (L) 4.4 - 11.3 x10*3/uL    nRBC 0.0 0.0 - 0.0 /100 WBCs    RBC 3.79 (L) 4.50 - " 5.90 x10*6/uL    Hemoglobin 10.7 (L) 13.5 - 17.5 g/dL    Hematocrit 33.9 (L) 41.0 - 52.0 %    MCV 89 80 - 100 fL    MCH 28.2 26.0 - 34.0 pg    MCHC 31.6 (L) 32.0 - 36.0 g/dL    RDW 14.0 11.5 - 14.5 %    Platelets 216 150 - 450 x10*3/uL    Neutrophils % 46.4 40.0 - 80.0 %    Immature Granulocytes %, Automated 1.1 (H) 0.0 - 0.9 %    Lymphocytes % 33.1 13.0 - 44.0 %    Monocytes % 13.0 2.0 - 10.0 %    Eosinophils % 5.6 0.0 - 6.0 %    Basophils % 0.8 0.0 - 2.0 %    Neutrophils Absolute 1.64 1.60 - 5.50 x10*3/uL    Immature Granulocytes Absolute, Automated 0.04 0.00 - 0.50 x10*3/uL    Lymphocytes Absolute 1.17 0.80 - 3.00 x10*3/uL    Monocytes Absolute 0.46 0.05 - 0.80 x10*3/uL    Eosinophils Absolute 0.20 0.00 - 0.40 x10*3/uL    Basophils Absolute 0.03 0.00 - 0.10 x10*3/uL   Renal Function Panel   Result Value Ref Range    Glucose 120 (H) 74 - 99 mg/dL    Sodium 135 (L) 136 - 145 mmol/L    Potassium 3.7 3.5 - 5.3 mmol/L    Chloride 101 98 - 107 mmol/L    Bicarbonate 25 21 - 32 mmol/L    Anion Gap 13 10 - 20 mmol/L    Urea Nitrogen 16 6 - 23 mg/dL    Creatinine 0.99 0.50 - 1.30 mg/dL    eGFR 80 >60 mL/min/1.73m*2    Calcium 8.8 8.6 - 10.6 mg/dL    Phosphorus 4.1 2.5 - 4.9 mg/dL    Albumin 3.2 (L) 3.4 - 5.0 g/dL   Magnesium   Result Value Ref Range    Magnesium 2.48 (H) 1.60 - 2.40 mg/dL   Creatine Kinase   Result Value Ref Range    Creatine Kinase 159 0 - 325 U/L   Vancomycin   Result Value Ref Range    Vancomycin <2.0 (L) 5.0 - 20.0 ug/mL   POCT GLUCOSE   Result Value Ref Range    POCT Glucose 186 (H) 74 - 99 mg/dL     Current Medications  Scheduled medications  [Held by provider] aspirin, 81 mg, oral, Daily  [Held by provider] enoxaparin, 40 mg, subcutaneous, q24h  insulin lispro, 0-5 Units, subcutaneous, TID  lidocaine, 1 patch, transdermal, Daily  losartan, 25 mg, oral, Daily  melatonin, 10 mg, oral, Nightly  polyethylene glycol, 17 g, oral, Daily  risperiDONE, 2.5 mg, oral, Daily  sennosides-docusate sodium, 2  tablet, oral, BID  sulfamethoxazole-trimethoprim, 2 tablet, oral, BID  tamsulosin, 0.4 mg, oral, Daily  traZODone, 100 mg, oral, Nightly      Continuous medications       PRN medications  PRN medications: acetaminophen, dextrose, dextrose, glucagon, glucagon, ketorolac, oxyCODONE     Assessment  Seven Robin is a 74 y.o. male who presented to [facility] on 9/24/[facility]24 as a full trauma activation for mechanical fall with head strike and deformity of the skull after falling from table and remaining on the ground for approximately 12 hours. Reports he was reaching for something and was standing on a table when he fell backwards. His roommate found him in the morning. Reports no loss of consciousness and can recall the fall.  Extensive imaging in the ED with no acute findings aside from scalp wound.  He has remained alert and oriented x 3 with no focal deficits for the duration of his hospitalization.  Had lactic acidosis and rhabdo, improving s/p fluids. Occipital wound with necrosis, wound care was consulted and recommended daily saline irrigation and xeroform dressing with Mepilex border dressing. Plastic Surgery was consulted for management of occipital wound.     Plan/Recommendations  S/p Scalp wound debridement 10/1  - Maintain dressing to scalp: telfa, abd, kerlix, and ace wrap       · Please inform plastics surgery if dressing is removed or if there is strikethrough bleeding   - Continue antibiotics per primary and follow up intraoperative cultures from wound  · 1+ mixed gram negative bacteria (final)  - Offload pressure off of occipital wound  - Recommend nutrition optimization for wound healing, albumin and prealbumin monitoring, nutrition supplements Q meal  - Recommend tight glycemic control  - Plan to return to OR in 2-3 weeks for skin graft pending surgeon and OR availability  - Remainder of care per primary team  - Plastics will continue to follow. OK for discharge from plastics perspective, will follow up  outpatient in 1-2 weeks.    Patient and plan discussed with Dr. Danielle.     Pam Mason PA-C  Plastic and Reconstructive Surgery   [place]  Pager #[phone]  Team phone: [phone]

## 2024-10-03 NOTE — RESEARCH NOTES
Artificial Intelligence Monitoring in Nursing (AIMS Nursing) Study    Principle Investigator - Dr. Brent Madden  Research Coordinator - Heavenly Patel     Patient Name - Seven Robin  Date - 10/3/2024 11:14 AM  Location - [place] [address]    Seven Robin was approached by Heavenly Patel to talk about participating in the AIMS Nursing Study. The patient was not able to be approached, a research coordinator will come back at a later time. Study protocol was followed and patient was given study contact information.     Heavenly Patel

## 2024-10-03 NOTE — PROGRESS NOTES
Seven Robin is a 74 y.o. male on day 9 of admission presenting with Fall, initial encounter.    SW called pt's brother, Danny, to discuss choices for SNF. Ashwinfrannie gave SW 4 additional choices to send referrals to. SW told him that she would update him on who can accept, and he can choose an FOC from there. Danny understood and did not have any additional questions.      UPDATE 11:07  SW called pt's brother, Danny, to inform him that so far [facility] is the only facility able to accept at this time. Danny said that he would speak with the pt about this and let SW know if they want to move forward.      UPDATE 14:00  SW met at pt's bedside to discuss SNF and see if he is agreable to [facility]. SW explained that [facility] is able to take pt and asked if he was agreeable to go there. Pt had his eyes closed but grunted when SW asked this.   SW followed up with pt's brother, Danny, and asked if he was able to speak with pt regarding [facility] for SNF. Danny said he has not been able to get in contact with his brother but would let SW know when he does.    UPDATE 15:00  SW met at bedside with pt and asked if he was able to speak with this brother, pt said no. SW asked if she could try and call his brother with the pt, and pt grunted. SW attempted to call pt's brother, Danny, from her cell phone but it went to . SW left a message and will try again later.    SW to follow.    Fany Perez MSW [facility]  Care Transitions  2  [phone] or Epic Secure Chat

## 2024-10-03 NOTE — DISCHARGE INSTRUCTIONS
Plastic Surgery Post Discharge Instructions  Wound care:   Xeroform to be placed on the integra that is stapled to the wound, followed by fluffs/telfa, cover with ABD pad. Use kerlix wrap to secure dressing with chin strap technique and may use ACE wrap as well to secure dressing in place. Dressing to be changed three times a week until follow up with Dr. Danielle or his PA in 1-2 weeks for skin graft planning.      Call Physician If:  Contact the plastic surgery office for any questions and/or concerns regarding the surgical incision/site.  1. [phone] if Monday-Friday (8 a.m. - 4:30 p.m.)  2. [phone] and ask for the Plastic Surgery team on call provider if after hours or on weekends  3. Email [email or web address] for any non-urgent concerns      Call your MD or seek immediate medical attention if you experience any of the following symptoms:  1. Fever of 101.5 (38.5 C) or greater  2. Pain not controlled with prescribed pain medications  3. Uncontrolled nausea and/or vomiting  4. Drainage or swelling around your incisions and/or surgical sites   5. Separation of incisions, or tearing of the incision line  6. Large fluid collection under or around the incision or flap sites   7. Skin discoloration (including darkened appearance)  8. Difficulty breathing  9. Swelling, pain, heat and/or redness in your legs and/or calves  10. Inability to tolerate diet/fluid intake    Follow-up/Post Discharge Appointments:  Follow-up care is a key part of your treatment and safety. It is very important that you maintain follow-up care as directed so that your surgical site heals properly and does not lead to problems. Always carry a current medication list with you and bring it to ALL healthcare Provider visits. Be sure to maintain follow up with plastic surgery at your scheduled appointment. If you are unable to keep your appointment, or need to reschedule please contact our office at [phone].  Your follow up  appointment is scheduled for Wednesday 10/16 at 08:20

## 2024-10-04 LAB
ALBUMIN SERPL BCP-MCNC: 3.1 G/DL (ref 3.4–5)
ALBUMIN SERPL BCP-MCNC: 3.1 G/DL (ref 3.4–5)
ANION GAP SERPL CALC-SCNC: 10 MMOL/L (ref 10–20)
ANION GAP SERPL CALC-SCNC: 10 MMOL/L (ref 10–20)
BACTERIA BLD AEROBE CULT: ABNORMAL
BACTERIA BLD AEROBE CULT: ABNORMAL
BACTERIA BLD CULT: ABNORMAL
BACTERIA BLD CULT: ABNORMAL
BASOPHILS # BLD MANUAL: 0.06 X10*3/UL (ref 0–0.1)
BASOPHILS # BLD MANUAL: 0.06 X10*3/UL (ref 0–0.1)
BASOPHILS NFR BLD MANUAL: 1.7 %
BASOPHILS NFR BLD MANUAL: 1.7 %
BUN SERPL-MCNC: 14 MG/DL (ref 6–23)
BUN SERPL-MCNC: 14 MG/DL (ref 6–23)
BURR CELLS BLD QL SMEAR: NORMAL
BURR CELLS BLD QL SMEAR: NORMAL
CALCIUM SERPL-MCNC: 9 MG/DL (ref 8.6–10.6)
CALCIUM SERPL-MCNC: 9 MG/DL (ref 8.6–10.6)
CHLORIDE SERPL-SCNC: 103 MMOL/L (ref 98–107)
CHLORIDE SERPL-SCNC: 103 MMOL/L (ref 98–107)
CK SERPL-CCNC: 168 U/L (ref 0–325)
CK SERPL-CCNC: 168 U/L (ref 0–325)
CO2 SERPL-SCNC: 29 MMOL/L (ref 21–32)
CO2 SERPL-SCNC: 29 MMOL/L (ref 21–32)
CREAT SERPL-MCNC: 1.05 MG/DL (ref 0.5–1.3)
CREAT SERPL-MCNC: 1.05 MG/DL (ref 0.5–1.3)
EGFRCR SERPLBLD CKD-EPI 2021: 74 ML/MIN/1.73M*2
EGFRCR SERPLBLD CKD-EPI 2021: 74 ML/MIN/1.73M*2
EOSINOPHIL # BLD MANUAL: 0.17 X10*3/UL (ref 0–0.4)
EOSINOPHIL # BLD MANUAL: 0.17 X10*3/UL (ref 0–0.4)
EOSINOPHIL NFR BLD MANUAL: 5.1 %
EOSINOPHIL NFR BLD MANUAL: 5.1 %
ERYTHROCYTE [DISTWIDTH] IN BLOOD BY AUTOMATED COUNT: 13.6 % (ref 11.5–14.5)
ERYTHROCYTE [DISTWIDTH] IN BLOOD BY AUTOMATED COUNT: 13.6 % (ref 11.5–14.5)
FUNGUS SPEC CULT: NORMAL
FUNGUS SPEC FUNGUS STN: NORMAL
GLUCOSE BLD MANUAL STRIP-MCNC: 108 MG/DL (ref 74–99)
GLUCOSE BLD MANUAL STRIP-MCNC: 108 MG/DL (ref 74–99)
GLUCOSE BLD MANUAL STRIP-MCNC: 114 MG/DL (ref 74–99)
GLUCOSE BLD MANUAL STRIP-MCNC: 114 MG/DL (ref 74–99)
GLUCOSE BLD MANUAL STRIP-MCNC: 127 MG/DL (ref 74–99)
GLUCOSE BLD MANUAL STRIP-MCNC: 127 MG/DL (ref 74–99)
GLUCOSE BLD MANUAL STRIP-MCNC: 129 MG/DL (ref 74–99)
GLUCOSE BLD MANUAL STRIP-MCNC: 129 MG/DL (ref 74–99)
GLUCOSE SERPL-MCNC: 111 MG/DL (ref 74–99)
GLUCOSE SERPL-MCNC: 111 MG/DL (ref 74–99)
GRAM STN SPEC: ABNORMAL
GRAM STN SPEC: ABNORMAL
HCT VFR BLD AUTO: 33.9 % (ref 41–52)
HCT VFR BLD AUTO: 33.9 % (ref 41–52)
HGB BLD-MCNC: 11.2 G/DL (ref 13.5–17.5)
HGB BLD-MCNC: 11.2 G/DL (ref 13.5–17.5)
IMM GRANULOCYTES # BLD AUTO: 0.06 X10*3/UL (ref 0–0.5)
IMM GRANULOCYTES # BLD AUTO: 0.06 X10*3/UL (ref 0–0.5)
IMM GRANULOCYTES NFR BLD AUTO: 1.8 % (ref 0–0.9)
IMM GRANULOCYTES NFR BLD AUTO: 1.8 % (ref 0–0.9)
LYMPHOCYTES # BLD MANUAL: 1.02 X10*3/UL (ref 0.8–3)
LYMPHOCYTES # BLD MANUAL: 1.02 X10*3/UL (ref 0.8–3)
LYMPHOCYTES NFR BLD MANUAL: 29.9 %
LYMPHOCYTES NFR BLD MANUAL: 29.9 %
MAGNESIUM SERPL-MCNC: 2.25 MG/DL (ref 1.6–2.4)
MAGNESIUM SERPL-MCNC: 2.25 MG/DL (ref 1.6–2.4)
MCH RBC QN AUTO: 27.7 PG (ref 26–34)
MCH RBC QN AUTO: 27.7 PG (ref 26–34)
MCHC RBC AUTO-ENTMCNC: 33 G/DL (ref 32–36)
MCHC RBC AUTO-ENTMCNC: 33 G/DL (ref 32–36)
MCV RBC AUTO: 84 FL (ref 80–100)
MCV RBC AUTO: 84 FL (ref 80–100)
MONOCYTES # BLD MANUAL: 0.38 X10*3/UL (ref 0.05–0.8)
MONOCYTES # BLD MANUAL: 0.38 X10*3/UL (ref 0.05–0.8)
MONOCYTES NFR BLD MANUAL: 11.1 %
MONOCYTES NFR BLD MANUAL: 11.1 %
NEUTS SEG # BLD MANUAL: 1.77 X10*3/UL (ref 1.6–5)
NEUTS SEG # BLD MANUAL: 1.77 X10*3/UL (ref 1.6–5)
NEUTS SEG NFR BLD MANUAL: 52.2 %
NEUTS SEG NFR BLD MANUAL: 52.2 %
NRBC BLD-RTO: 0 /100 WBCS (ref 0–0)
NRBC BLD-RTO: 0 /100 WBCS (ref 0–0)
OVALOCYTES BLD QL SMEAR: NORMAL
OVALOCYTES BLD QL SMEAR: NORMAL
PHOSPHATE SERPL-MCNC: 3.8 MG/DL (ref 2.5–4.9)
PHOSPHATE SERPL-MCNC: 3.8 MG/DL (ref 2.5–4.9)
PLATELET # BLD AUTO: 124 X10*3/UL (ref 150–450)
PLATELET # BLD AUTO: 124 X10*3/UL (ref 150–450)
POTASSIUM SERPL-SCNC: 4.3 MMOL/L (ref 3.5–5.3)
POTASSIUM SERPL-SCNC: 4.3 MMOL/L (ref 3.5–5.3)
RBC # BLD AUTO: 4.04 X10*6/UL (ref 4.5–5.9)
RBC # BLD AUTO: 4.04 X10*6/UL (ref 4.5–5.9)
RBC MORPH BLD: NORMAL
RBC MORPH BLD: NORMAL
SODIUM SERPL-SCNC: 138 MMOL/L (ref 136–145)
SODIUM SERPL-SCNC: 138 MMOL/L (ref 136–145)
TOTAL CELLS COUNTED BLD: 117
TOTAL CELLS COUNTED BLD: 117
WBC # BLD AUTO: 3.4 X10*3/UL (ref 4.4–11.3)
WBC # BLD AUTO: 3.4 X10*3/UL (ref 4.4–11.3)

## 2024-10-04 PROCEDURE — 2500000004 HC RX 250 GENERAL PHARMACY W/ HCPCS (ALT 636 FOR OP/ED): Performed by: INTERNAL MEDICINE

## 2024-10-04 PROCEDURE — 2500000002 HC RX 250 W HCPCS SELF ADMINISTERED DRUGS (ALT 637 FOR MEDICARE OP, ALT 636 FOR OP/ED): Performed by: INTERNAL MEDICINE

## 2024-10-04 PROCEDURE — 99232 SBSQ HOSP IP/OBS MODERATE 35: CPT | Performed by: INTERNAL MEDICINE

## 2024-10-04 PROCEDURE — 97530 THERAPEUTIC ACTIVITIES: CPT | Mod: GO

## 2024-10-04 PROCEDURE — 97116 GAIT TRAINING THERAPY: CPT | Mod: GP

## 2024-10-04 PROCEDURE — 1210000001 HC SEMI-PRIVATE ROOM DAILY

## 2024-10-04 PROCEDURE — 97110 THERAPEUTIC EXERCISES: CPT | Mod: GP

## 2024-10-04 PROCEDURE — 84100 ASSAY OF PHOSPHORUS: CPT | Performed by: INTERNAL MEDICINE

## 2024-10-04 PROCEDURE — 83735 ASSAY OF MAGNESIUM: CPT | Performed by: INTERNAL MEDICINE

## 2024-10-04 PROCEDURE — 2500000001 HC RX 250 WO HCPCS SELF ADMINISTERED DRUGS (ALT 637 FOR MEDICARE OP): Performed by: INTERNAL MEDICINE

## 2024-10-04 PROCEDURE — 2500000005 HC RX 250 GENERAL PHARMACY W/O HCPCS: Performed by: INTERNAL MEDICINE

## 2024-10-04 PROCEDURE — 82550 ASSAY OF CK (CPK): CPT | Performed by: INTERNAL MEDICINE

## 2024-10-04 PROCEDURE — 97110 THERAPEUTIC EXERCISES: CPT | Mod: GO

## 2024-10-04 PROCEDURE — 99232 SBSQ HOSP IP/OBS MODERATE 35: CPT

## 2024-10-04 PROCEDURE — 85007 BL SMEAR W/DIFF WBC COUNT: CPT | Performed by: INTERNAL MEDICINE

## 2024-10-04 PROCEDURE — 97535 SELF CARE MNGMENT TRAINING: CPT | Mod: GO

## 2024-10-04 PROCEDURE — 85027 COMPLETE CBC AUTOMATED: CPT | Performed by: INTERNAL MEDICINE

## 2024-10-04 PROCEDURE — 36415 COLL VENOUS BLD VENIPUNCTURE: CPT | Performed by: INTERNAL MEDICINE

## 2024-10-04 PROCEDURE — 82947 ASSAY GLUCOSE BLOOD QUANT: CPT

## 2024-10-04 RX ORDER — SULFAMETHOXAZOLE AND TRIMETHOPRIM 800; 160 MG/1; MG/1
2 TABLET ORAL 2 TIMES DAILY
Start: 2024-10-04 | End: 2024-10-11

## 2024-10-04 ASSESSMENT — COGNITIVE AND FUNCTIONAL STATUS - GENERAL
WALKING IN HOSPITAL ROOM: A LITTLE
MOVING TO AND FROM BED TO CHAIR: A LITTLE
HELP NEEDED FOR BATHING: A LITTLE
HELP NEEDED FOR BATHING: A LITTLE
DAILY ACTIVITIY SCORE: 18
DRESSING REGULAR UPPER BODY CLOTHING: A LITTLE
MOVING TO AND FROM BED TO CHAIR: A LITTLE
STANDING UP FROM CHAIR USING ARMS: A LITTLE
STANDING UP FROM CHAIR USING ARMS: A LITTLE
MOBILITY SCORE: 16
MOVING FROM LYING ON BACK TO SITTING ON SIDE OF FLAT BED WITH BEDRAILS: A LITTLE
MOVING FROM LYING ON BACK TO SITTING ON SIDE OF FLAT BED WITH BEDRAILS: A LITTLE
TURNING FROM BACK TO SIDE WHILE IN FLAT BAD: A LITTLE
DAILY ACTIVITIY SCORE: 19
CLIMB 3 TO 5 STEPS WITH RAILING: A LOT
PERSONAL GROOMING: A LITTLE
DRESSING REGULAR LOWER BODY CLOTHING: A LITTLE
PERSONAL GROOMING: A LITTLE
DRESSING REGULAR LOWER BODY CLOTHING: A LITTLE
TOILETING: A LOT
CLIMB 3 TO 5 STEPS WITH RAILING: A LOT
DRESSING REGULAR UPPER BODY CLOTHING: A LITTLE
TOILETING: A LITTLE
WALKING IN HOSPITAL ROOM: A LOT
MOBILITY SCORE: 17
TURNING FROM BACK TO SIDE WHILE IN FLAT BAD: A LITTLE

## 2024-10-04 ASSESSMENT — PAIN DESCRIPTION - ORIENTATION
ORIENTATION: LEFT
ORIENTATION: POSTERIOR

## 2024-10-04 ASSESSMENT — PAIN - FUNCTIONAL ASSESSMENT
PAIN_FUNCTIONAL_ASSESSMENT: 0-10

## 2024-10-04 ASSESSMENT — PAIN SCALES - GENERAL
PAINLEVEL_OUTOF10: 0 - NO PAIN
PAINLEVEL_OUTOF10: 4
PAINLEVEL_OUTOF10: 6
PAINLEVEL_OUTOF10: 7
PAINLEVEL_OUTOF10: 4

## 2024-10-04 ASSESSMENT — ACTIVITIES OF DAILY LIVING (ADL)
BATHING_LEVEL_OF_ASSISTANCE: MINIMUM ASSISTANCE
HOME_MANAGEMENT_TIME_ENTRY: 15

## 2024-10-04 ASSESSMENT — PAIN DESCRIPTION - LOCATION
LOCATION: HEAD
LOCATION: SHOULDER

## 2024-10-04 ASSESSMENT — PAIN DESCRIPTION - DESCRIPTORS: DESCRIPTORS: SORE

## 2024-10-04 NOTE — PROGRESS NOTES
Recreation Therapy Note    Therapy Session  Visit Type: New visit  Session Start Time: 1750  Session End Time: 1810  Intervention Delivery: In-person  Conflict of Service: None  Number of family members present: 0  Family Present for Session: None  Family Participation: None  Number of staff members present: 1    Pre-assessment  Mood/Affect: Anxious     Post-assessment  0-10 (Numeric) Pain Score: 0 - No pain  Macias-Baker FACES Pain Rating: No hurt  Mood/Affect: Calm, Cooperative  Continue Visiting: Yes  Total Session Time (min): 20 minutes    Narrative  Assessment Detail: Patient was resting in bed upon arrival.  Plan: To encourage the exploration of safe and effective positive leisure coping skills to manage situational stressors.  Intervention: Patient chose to play cards.  Evaluation: Patient was able to play the card game with minimal assistance.  Stated that he was bored especially at night.  Gave patient a word search book and a deck of cards for him to use.  Attempted to teach patient how to play [employer].  Will work on that more tomorrow.  Follow-up: Will continue to encourage the exploration of positive leisure coping skills.  Patient Comments: See above      History of Present Illness  Onehundredseventeen Trauma Riaz (Seven Robin) is a 74 y.o. male with PMHx of HTN, HLD, DM, cirrhosis 2/2 treated Hep C (Tx 8939-7525), BPH, schizoaffective disorder and prostate CA s/p EBRT presenting to the ED s/p mechanical fall leading to trauma activation. Pt reports he was standing on a table when he fell backwards hitting his head last night around 9 PM.  Patient was unable to get up on his own was found down by his roommate this morning. Pt reports being down for 12 hours and was unable to get up on his own.  The patient denies any loss of consciousness, lightheadedness or dizziness prior to the fall. Reports having pain in his back and his head.  Patient noted to have an obvious deformity in the back of his  skull.  His lip is also swollen. Patient without any signs of confusion and able to recall the fall.

## 2024-10-04 NOTE — PROGRESS NOTES
Occupational Therapy    Occupational Therapy Treatment    Name: Seven Robin  MRN: [id number]  : 1950  Date: 10/04/24  Room: [address] [place] [address]      Time Calculation  Start Time: 1214  Stop Time: 1252  Time Calculation (min): 38 min    Assessment:  OT Assessment: Decreased independence and safety with ADLs, functional transfers, and mobility.  Prognosis: Good  Barriers to Discharge: None  Evaluation/Treatment Tolerance: Patient tolerated treatment well  Medical Staff Made Aware: Yes  End of Session Communication: Bedside nurse  End of Session Patient Position: Up in chair, Alarm on  Plan:  Treatment Interventions: ADL retraining, Functional transfer training, UE strengthening/ROM, Endurance training, Cognitive reorientation, Patient/family training, Compensatory technique education, Equipment evaluation/education  OT Frequency: 2 times per week  OT Discharge Recommendations: Moderate intensity level of continued care  Equipment Recommended upon Discharge: Wheeled walker  OT Recommended Transfer Status: Assist of 1  OT - OK to Discharge: Yes    Subjective   General:  OT Last Visit  OT Received On: 10/04/24  Reason for Referral: s/p mechanical fall leading to trauma activation. Pt reports he was standing on a table when he fell backwards hitting his head. Down for 12 hours before being found. Posterior scalp contusion, with obvious scalp deformity and unstageable pressure injury. S/p scalp wound debridement  (10/1).  Past Medical History Relevant to Rehab: HTN, HLD, DM, cirrhosis 2/2 treated Hep C (Tx [id number]), BPH, schizoaffective disorder and prostate CA s/p EBRT  Prior to Session Communication: Bedside nurse  Patient Position Received: Up in chair, Alarm on  General Comment: Pt receptive to therapy. Participates fully.   Precautions:  Medical Precautions: Fall precautions  Vitals:  Vital Signs (Past 2hrs)           Lines/Tubes/Drains:  External Urinary Catheter Male (Active)   Number of days: 4        Cognition:  Overall Cognitive Status: Within Functional Limits  Orientation Level: Oriented X4  Insight: Moderate  Impulsive: Mildly  Processing Speed: Delayed    Pain Assessment:  Pain Assessment  Pain Assessment: 0-10  0-10 (Numeric) Pain Score: 4  Pain Location:  (head and L shoulder)  Pain Interventions: Ambulation/increased activity, Distraction     Objective   Activities of Daily Living:        Grooming  Grooming Level of Assistance: Close supervision, Setup  Grooming Where Assessed: Other (Comment) (standing at tray table)  Grooming Comments: Pt performed oral hygiene    UE Bathing  UE Bathing Level of Assistance: Close supervision, Minimal verbal cues  UE Bathing Where Assessed: Other (Comment) (sitting in chair)    LE Bathing  LE Bathing Level of Assistance: Minimum assistance  LE Bathing Where Assessed:  (sitting in chair, standing at FWW)  LE Bathing Comments: assist with thoroughness with buttocks    UE Dressing  UE Dressing Level of Assistance: Close supervision, Setup  UE Dressing Where Assessed: Chair  UE Dressing Comments: don gown    LE Dressing  LE Dressing: Yes  Sock Level of Assistance: Minimum assistance  LE Dressing Where Assessed: Chair  LE Dressing Comments: assist to manage L sock from midfoot over heel      Functional Standing Tolerance:  Functional Standing Tolerance  Time: 4 minutes  Activity: UE AROM ther ex  Functional Standing Tolerance Comments: stood with SBA-CGA  Functional Mobility  Functional Mobility Performed: Yes  Functional Mobility 1  Surface 1: Level tile  Device 1: Rolling walker  Assistance 1:  (Min-CGA)  Comments 1: Pt performed functional mobility x short distance ~4 feet fwd/back x6 reps. Pt reports restless legs and wants to walk. Cued for safe walker management/keep close to body.  Bed Mobility/Transfers:      Transfers  Transfer: Yes  Transfer 1  Transfer From 1: Chair with arms to  Transfer to 1: Stand  Transfer Device 1: Walker  Transfer Level of Assistance  1: Contact guard  Trials/Comments 1: x5 reps over tx session  Transfers 2  Transfer From 2: Stand to  Transfer to 2: Chair with arms  Technique 2: Stand to sit  Transfer Device 2: Walker  Transfer Level of Assistance 2: Contact guard, Minimal verbal cues  Trials/Comments 2: cued to reach back to armrest to assist with controlled descent.     Therapy/Activity:   Therapeutic Exercise  Therapeutic Exercise Performed: Yes  Therapeutic Exercise Activity 1: standing at FWW, B UE AROM ther ex: shoulder flexion/extension, overhead press, chest press, and elbow flexion/extension.      Outcome Measures:  [facility] Daily Activity  Putting on and taking off regular lower body clothing: A little  Bathing (including washing, rinsing, drying): A little  Putting on and taking off regular upper body clothing: A little  Toileting, which includes using toilet, bedpan or urinal: A lot  Taking care of personal grooming such as brushing teeth: A little  Eating Meals: None  Daily Activity - Total Score: 18  Brief Confusion Assessment Method (bCAM)  CAM Result: CAM -     Education Documentation  Precautions, taught by Gabriella Villareal OT at 10/4/2024  1:36 PM.  Learner: Patient  Readiness: Acceptance  Method: Explanation, Demonstration  Response: Verbalizes Understanding, Needs Reinforcement    Body Mechanics, taught by Gabriella Villareal OT at 10/4/2024  1:36 PM.  Learner: Patient  Readiness: Acceptance  Method: Explanation, Demonstration  Response: Verbalizes Understanding, Needs Reinforcement    ADL Training, taught by Gabriella Villareal OT at 10/4/2024  1:36 PM.  Learner: Patient  Readiness: Acceptance  Method: Explanation, Demonstration  Response: Verbalizes Understanding, Needs Reinforcement    Education Comments  No comments found.        Goals:  Encounter Problems       Encounter Problems (Active)       ADLs       Patient with complete upper body dressing with independent level of assistance donning and doffing all UE clothes with PRN  adaptive equipment while edge of bed. (Progressing)       Start:  09/27/24    Expected End:  10/18/24            Patient with complete lower body dressing with modified independent level of assistance donning and doffing all LE clothes  with PRN adaptive equipment while edge of bed. (Progressing)       Start:  09/27/24    Expected End:  10/18/24            Patient will complete daily grooming tasks brushing teeth and washing face/hair with modified independent level of assistance and PRN adaptive equipment while standing. (Progressing)       Start:  09/27/24    Expected End:  10/18/24               MOBILITY       Patient will perform Functional mobility max Household distances/Community Distances with modified independent level of assistance and least restrictive device in order to improve safety and functional mobility. (Progressing)       Start:  09/27/24    Expected End:  10/18/24               TRANSFERS       Patient will complete sit to stand transfer with modified independent level of assistance and least restrictive device in order to improve safety and prepare for out of bed mobility. (Progressing)       Start:  09/27/24    Expected End:  10/18/24                     10/04/24 at 1:37 PM   Gabriella Villareal, OT   [phone]

## 2024-10-04 NOTE — PROGRESS NOTES
Physical Therapy    Physical Therapy Treatment    Patient Name: Seven Robin  MRN: [id number]  Department: [place] [address]  Room: [facility]  Today's Date: 10/4/2024  Time Calculation  Start Time: 1134  Stop Time: 1201  Time Calculation (min): 27 min         Assessment/Plan   PT Assessment  PT Assessment Results: Decreased strength, Impaired balance, Decreased mobility, Decreased safety awareness  End of Session Communication: Bedside nurse  Assessment Comment: Pt able to increase amb distance though demos impaired balance/need for increased assist due to heavy (R) lean and narrow/almost scissoring base of support.  Will continue to benefit from skilled PT to address all mobility deficits.  End of Session Patient Position: Up in chair, Alarm on  PT Plan  Inpatient/Swing Bed or Outpatient: Inpatient  PT Plan  Treatment/Interventions: Bed mobility, Transfer training, Gait training, Stair training, Balance training, Strengthening, Endurance training, Therapeutic activity, Therapeutic exercise  PT Plan: Ongoing PT  PT Frequency: 3 times per week  PT Discharge Recommendations:  (At this time pt. has been recommended to discharge to a inpt. facility post acute - Will continue to assess the necessity.)  Equipment Recommended upon Discharge: Wheeled walker  PT Recommended Transfer Status: Assist x1  PT - OK to Discharge: Yes      General Visit Information:   PT  Visit  PT Received On: 10/04/24  General  Prior to Session Communication: Bedside nurse  General Comment: Pt agreable to participate, eager to walk this date.  Does demo impaired balance, needing increased assist to maintain safety wtih standing and gait tasks this session.  Completed short bouts of amb in room for safety due to balance impairments.  Will continue to follow.    Subjective   Precautions:  Precautions  Medical Precautions: Fall precautions    Objective   Pain:  Pain Assessment  Pain Assessment: 0-10  0-10 (Numeric) Pain Score: 4  Pain Location:  (head and  buttocks sores)  Pain Interventions: Repositioned, Ambulation/increased activity, Distraction  Response to Interventions: appears to tolerate mobility activities  Cognition:  Cognition  Following Commands: Follows one step commands with repetition  Insight: Moderate  Impulsive: Mildly     Postural Control:  Postural Control  Postural Control: Impaired  Static Sitting Balance  Static Sitting-Balance Support: Feet supported  Static Sitting-Level of Assistance: Distant supervision  Static Standing Balance  Static Standing-Balance Support: Bilateral upper extremity supported  Static Standing-Level of Assistance: Minimum assistance, Contact guard  Dynamic Standing Balance  Dynamic Standing-Balance Support: Bilateral upper extremity supported  Dynamic Standing-Level of Assistance: Moderate assistance, Minimum assistance  Dynamic Standing-Comments: with whw    Activity Tolerance:  Activity Tolerance  Endurance: Tolerates 10 - 20 min exercise with multiple rests  Treatments:  Therapeutic Exercise  Therapeutic Exercise Performed: Yes  Therapeutic Exercise Activity 1: (B) LE APs, LAQ x 15 reps  Therapeutic Exercise Activity 2: standing with whw, min Ax1 for safety, standing marches x 10 reps each    Therapeutic Activity  Therapeutic Activity Performed: Yes  Therapeutic Activity 1: requires assist to thread legs in and out of adult brief as pt reports sometimes incontinent of urine (has male purewick on and wanting to detach from suction for further mobility).  Completed donning of doffing of brief in sitting and standing with overall mod Ax1 to complete.    Bed Mobility  Bed Mobility: Yes  Bed Mobility 1  Bed Mobility 1: Supine to sitting  Level of Assistance 1: Contact guard, Minimal verbal cues (x1)  Bed Mobility Comments 1: HOB raised  Bed Mobility 2  Bed Mobility Comments 2: up in chair upon PT exit with needs in reach    Ambulation/Gait Training  Ambulation/Gait Training Performed: Yes  Ambulation/Gait Training  1  Surface 1: Level tile  Device 1: Rolling walker  Assistance 1: Moderate assistance, Minimum assistance, Moderate verbal cues  Quality of Gait 1: Listing, Decreased step length, Narrow base of support (almost scissoring gait at times with increased (R) lean, unsteady with changes in direction, increased levels of assist to maintain safety, limited to 2 bouts of short distance amb for safety)  Comments/Distance (ft) 1: 25ft x2  Transfers  Transfer: Yes  Transfer 1  Transfer From 1: Bed to  Transfer to 1: Stand  Transfer Level of Assistance 1: Contact guard, Minimal verbal cues  Transfers 2  Transfer From 2: Stand to  Transfer to 2: Chair with arms  Transfer Level of Assistance 2: Minimum assistance, Minimal verbal cues (x1)  Trials/Comments 2: decreased control with sit onto lower surface, completed x 2  Transfers 3  Transfer From 3: Chair with arms to  Transfer to 3: Stand  Transfer Level of Assistance 3: Minimum assistance, Minimal verbal cues (x1)  Trials/Comments 3: cues for hand placement    Outcome Measures:  [facility] Basic Mobility  Turning from your back to your side while in a flat bed without using bedrails: A little  Moving from lying on your back to sitting on the side of a flat bed without using bedrails: A little  Moving to and from bed to chair (including a wheelchair): A little  Standing up from a chair using your arms (e.g. wheelchair or bedside chair): A little  To walk in hospital room: A lot  Climbing 3-5 steps with railing: A lot  Basic Mobility - Total Score: 16    Tinetti  Sitting Balance: Steady, safe  Arises: Able, uses arms to help  Attempts to Arise: Able to arise, one attempt  Immediate Standing Balance (First 5 Seconds): Steady but uses walker or other support  Standing Balance: Unsteady  Nudged: Staggers, grabs, catches self  Eyes Closed: Unsteady  Turned 360 Degrees: Steadiness: Unsteady (Grabs, staggers)  Turned 360 Degrees: Continuity of Steps: Discontinuous steps  Sitting Down: Unsafe  (Misjudges distance, falls into chair)  Balance Score: 6  Initiation of Gait: No hesitancy  Step Height: R Swing Foot: Right foot complete clears floor  Step Length: R Swing Foot: Passes left stance foot  Step Height: L Swing Foot: Left foot complete clears floor  Step Length: L Swing Foot: Passes right stance foot  Step Symmetry: Right and left step length not equal (Estimate)  Step Continuity: Stopping or discontinuity between steps  Path: Marked deviation  Trunk: Marked sway or uses walking aid  Walking Time: Heels almost touching while walking  Gait Score: 6  Total Score: 12    Education Documentation  Mobility Training, taught by Christi Zhao, PT at 10/4/2024 12:43 PM.  Learner: Patient  Readiness: Acceptance  Method: Explanation  Response: Verbalizes Understanding, Needs Reinforcement  Comment: need for assist for OOB mobility, limited amb distance due to need for increased assist to maintain safety due to poor balance with gait    OP EDUCATION:       Encounter Problems       Encounter Problems (Active)       Balance       Patient will score >/=20/28 on Tinetti to promote safety and prevent falls (Progressing)       Start:  09/26/24    Expected End:  10/10/24               Mobility       Patient will ambulate 150 ft with SBA and LRAD (Progressing)       Start:  09/26/24    Expected End:  10/10/24            Patient will ascend/ descend 8 steps with CGA and LRAD (Progressing)       Start:  09/26/24    Expected End:  10/10/24               PT Transfers       Patient will be independent with bed mobility (Progressing)       Start:  09/26/24    Expected End:  10/10/24            Patient will be SBA with sit to stand with LRAD (Progressing)       Start:  09/26/24    Expected End:  10/10/24                   10/04/24 at 12:45 PM - Christi Zhao, PT

## 2024-10-04 NOTE — PROGRESS NOTES
"Seven Robin is a 74 y.o. male on day 10 of admission presenting with Fall, initial encounter.    Subjective   Patient lying in bed without distress.  Still reports that his head feels sore, describes the pain as moderate 5/10.  Reports that pain medication seems to be helping some.    Objective     General: Lying in bed without distress.  Appears a little unsteady.  Cooperative.  Skin: Laceration on scalp, currently covered in dressing.  HEENT: Sclera is white.  Mucous membranes moist.  Neck: Supple.  No JVD.  Cardiac: Regular rate and rhythm, S1/S2 normal.  Lungs: Clear to auscultation bilaterally, no wheezing or crackles, no accessory muscle use at rest.  Abdomen: Soft, nontender, nondistended, BS +  Extremities: No cyanosis.  No lower extremity edema.  Neurologic: Alert and oriented x3.  No focal deficits.  Psychiatric: Appropriate mood and behavior.  Currently no agitation.    Last Recorded Vitals  Blood pressure 121/62, pulse 58, temperature 36.3 °C (97.3 °F), temperature source Temporal, resp. rate 16, height 1.803 m (5' 11\"), weight 94.3 kg (208 lb), SpO2 97%.  On room air.    Intake/Output last 3 Shifts:  I/O last 3 completed shifts:  In: 840 (8.9 mL/kg) [P.O.:840]  Out: 2000 (21.2 mL/kg) [Urine:2000 (0.6 mL/kg/hr)]  Weight: 94.3 kg     Relevant Results  [Held by provider] aspirin, 81 mg, oral, Daily  [Held by provider] enoxaparin, 40 mg, subcutaneous, q24h  insulin lispro, 0-5 Units, subcutaneous, TID  lidocaine, 1 patch, transdermal, Daily  losartan, 25 mg, oral, Daily  melatonin, 10 mg, oral, Nightly  polyethylene glycol, 17 g, oral, Daily  risperiDONE, 2.5 mg, oral, Daily  sennosides-docusate sodium, 2 tablet, oral, BID  sulfamethoxazole-trimethoprim, 2 tablet, oral, BID  tamsulosin, 0.4 mg, oral, Daily  traZODone, 100 mg, oral, Nightly           PRN medications: acetaminophen, dextrose, dextrose, glucagon, glucagon, ketorolac, oxyCODONE     Results for orders placed or performed during the hospital " encounter of 09/24/24 (from the past 24 hour(s))   POCT GLUCOSE   Result Value Ref Range    POCT Glucose 113 (H) 74 - 99 mg/dL   POCT GLUCOSE   Result Value Ref Range    POCT Glucose 110 (H) 74 - 99 mg/dL   CBC and Auto Differential   Result Value Ref Range    WBC 3.4 (L) 4.4 - 11.3 x10*3/uL    nRBC 0.0 0.0 - 0.0 /100 WBCs    RBC 4.04 (L) 4.50 - 5.90 x10*6/uL    Hemoglobin 11.2 (L) 13.5 - 17.5 g/dL    Hematocrit 33.9 (L) 41.0 - 52.0 %    MCV 84 80 - 100 fL    MCH 27.7 26.0 - 34.0 pg    MCHC 33.0 32.0 - 36.0 g/dL    RDW 13.6 11.5 - 14.5 %    Platelets 124 (L) 150 - 450 x10*3/uL    Immature Granulocytes %, Automated 1.8 (H) 0.0 - 0.9 %    Immature Granulocytes Absolute, Automated 0.06 0.00 - 0.50 x10*3/uL   Renal Function Panel   Result Value Ref Range    Glucose 111 (H) 74 - 99 mg/dL    Sodium 138 136 - 145 mmol/L    Potassium 4.3 3.5 - 5.3 mmol/L    Chloride 103 98 - 107 mmol/L    Bicarbonate 29 21 - 32 mmol/L    Anion Gap 10 10 - 20 mmol/L    Urea Nitrogen 14 6 - 23 mg/dL    Creatinine 1.05 0.50 - 1.30 mg/dL    eGFR 74 >60 mL/min/1.73m*2    Calcium 9.0 8.6 - 10.6 mg/dL    Phosphorus 3.8 2.5 - 4.9 mg/dL    Albumin 3.1 (L) 3.4 - 5.0 g/dL   Magnesium   Result Value Ref Range    Magnesium 2.25 1.60 - 2.40 mg/dL   Creatine Kinase   Result Value Ref Range    Creatine Kinase 168 0 - 325 U/L   Manual Differential   Result Value Ref Range    Neutrophils %, Manual 52.2 40.0 - 80.0 %    Lymphocytes %, Manual 29.9 13.0 - 44.0 %    Monocytes %, Manual 11.1 2.0 - 10.0 %    Eosinophils %, Manual 5.1 0.0 - 6.0 %    Basophils %, Manual 1.7 0.0 - 2.0 %    Seg Neutrophils Absolute, Manual 1.77 1.60 - 5.00 x10*3/uL    Lymphocytes Absolute, Manual 1.02 0.80 - 3.00 x10*3/uL    Monocytes Absolute, Manual 0.38 0.05 - 0.80 x10*3/uL    Eosinophils Absolute, Manual 0.17 0.00 - 0.40 x10*3/uL    Basophils Absolute, Manual 0.06 0.00 - 0.10 x10*3/uL    Total Cells Counted 117     RBC Morphology See Below     Ovalocytes Few     Charley Cells Few     POCT GLUCOSE   Result Value Ref Range    POCT Glucose 114 (H) 74 - 99 mg/dL   POCT GLUCOSE   Result Value Ref Range    POCT Glucose 129 (H) 74 - 99 mg/dL      Hospital Course:  Seven Robin is a 74 y.o. male with PMHx of HTN, HLD, DM, cirrhosis 2/2 treated Hep C (Tx [id number]), BPH, schizoaffective disorder and prostate CA s/p EBRT presenting to the ED s/p mechanical fall leading to trauma activation.  Patient's power had gone out and he was checking out his breaker box and was standing on a table when he lost his balance and fell backwards.  Patient sustained a scalp laceration, imaging negative for fractures bleed patient noted to have lactic acidosis and rhabdomyolysis.  With IV fluids rhabdomyolysis has significantly improved and almost resolved.  MRI obtained due to facial asymmetry, speech changes, vision change more likely related to facial trauma and swelling, showed 3mm SDH hematoma, now improving and do not appear to have neuro deficits. Neurosurg consulted, appears to be seen on CT but not in report, stable. Holding asa and dvt px also in setting of scalp hematoma/pressure injury. Plastics and ent consulted, s/p debridement of scalp on 10/1.  Blood cultures from 9/29 2/4 bottles growing gram-negative bacilli, eventually grew out Enterobacter cloacae.  Per plastic surgery preliminary growth from operative wound culture growing Enterobacter cloacae.  Repeat blood culture from 10/1 no growth to date.    Assessment/Plan     Mechanical Fall  Large posterior scalp unstagable pressure injury/eschar/hematoma  SDH, 3mm read as subacute to chronic  -no bleed or fractures identified, trauma team evaluated and signed off.  -MRI obtained due to facial asymmetry, speech changes, vision change more likely related to facial trauma and swelling, showed 3mm SDH hematoma, now improving and do not appear to have been neuro deficits. Neurosurg consulted, appears to be seen on CT but not in report, stable. Holding asa and  dvt px also in setting of scalp hematoma/pressure injury.    Plastics and ent consulted, s/p debridement of scalp on 10/1. Nutrition consulted for healing, continuing offloading and wound care per plastics.   -Neurosurgery had seen for subdural hematoma, recommended to hold aspirin until post bleed day #5 and to hold any prophylaxis until post bleed day #2.  Looking at his medical history it is not clear that aspirin was given for a specific medical condition, may have been for preventative treatment.  Will continue to hold for now.  -Wound cultures collected on 9/27. Prelim  mixed Gram-Positive and Gram-Negative Bacteria  -pt/ot recommending moderate intensity.  TCC aware.  -Plastic surgery following.  Plastic surgery indicates preliminary intraoperative cultures appears to be growing Enterobacter cloacae.  Plastic surgery recommending return to the OR in 2 to 3 weeks for skin graft. Offload pressure off of occipital wound. Current wound care recs: Non adherent telfa to be placed on the integra that is stapled to the wound. followed by fluffs, cover with ABD pad. Use kerlix wrap to secure dressing with chin strap technique and use ACE wrap as well to wrap around chin. Dressing to be changed three times a week until follow up with Dr. Danielle.   -Continue limited Tylenol as needed.  Continue IV Toradol 15 mg every 6 hours as needed for moderate pain, max 5 days.  Oxycodone 5 mg every 4 hours as needed for severe pain.     Enterobacter cloacae bacteremia  -Patient had been on IV Zosyn, switched to oral Bactrim DS 2 tablets twice a day.  On 10/2.  Plan for total 10 days from the date of debridement on 10/1. End date 10/11.  -Blood cultures x2 growing G- bacilli, preliminary identified as Enterobacter cloacae.  Sensitivities reviewed.  -Repeat blood culture 10/1 no growth to date.  -Likely source of infection is the scalp laceration and injury, source control done with debridement 10/1.     Rhabdomyolysis   -CK elevated  to 3,593, since then with IV fluids CPK has improved down to 377.  -Resolved.     Hyponatremia  -Likely secondary to fluids  -Improved, last sodium level 135, only mildly low at this time.  Monitor as needed.     troponin elevation  -mild uptrend initially then downtrend  -no angina or equivalents  -low concern for acs, likely related to demand  -denies chest pain or equivalents     HTN   HLD  -On lovastatin, triamterene-hydroCHLOROthiazide 37.5-25 mg daily, and ASA 81mg   -Holding ASA 81 mg daily.  This appears to be mostly preventative treatment and in the setting of subdural hematoma would not resume for now.  -Holding statin iso c/f rhabo.  Can probably resume on discharge.  -Systolic blood pressure 120's - 130s.     DM type 2  -A1c: 6.5  -Holding home Metformin 500mg BID   -Continue SSI   -Currently fasting and postprandial glucose appears controlled.     Schizoaffective disorder  MDD   insomnia   -On propanolol 10 mg, Risperidone 2.5 mg tablet and trazodone 100 mg tablet   -C/w Risperidone 2.5 mg and Trazodone 100mg      BPH  -C/w Tamsulosin     Back pain  -Lidocaine patch.    Disposition: Medically stable.  Currently waiting on placement.    Riri Huitron MD

## 2024-10-04 NOTE — PROGRESS NOTES
"  Department of Plastic and Reconstructive Surgery  Daily Progress Note    Patient Name: Seven Robin  MRN: [id number]  Date:  10/04/24     Subjective  Patient found resting in bed. No acute concerns. Wound covered intra-op with Integra, now with xerofrom, ABD, kerlix, and ace wrap dressing. Pain is well controlled. Denies any CP, SOB, nausea, vomiting, or abdominal pain/discomfort.    Objective    Vital Signs  /62   Pulse 58   Temp 36.3 °C (97.3 °F) (Temporal)   Resp 16   Ht 1.803 m (5' 11\")   Wt 94.3 kg (208 lb)   SpO2 97%   BMI 29.01 kg/m²      Physical Exam   Constitutional: Calm and cooperative, NAD.  Eyes: PERRL, EOMI  ENMT: Moist mucous membranes, no apparent injuries or lesions. Small lacerations on left lower lip and left side of chin, appear to be healing, scabbing on chin wounds, mild lower lip edema.  Head/Neck: Occipital scalp wound with dressing removed on exam to reveal well approximated integra stapled in place. Serosanguinous drainage. Dressing replaced, patient tolerated well.   Cardiovascular: Normal rate and regular rhythm. 2+ equal pulses of the distal extremities.  Respiratory/Thorax: Regular respirations on NC. Good symmetric chest expansion.   Gastrointestinal: Abdomen soft, NTND  Genitourinary: Voiding independently via urinal  Extremities: Generalized weakness, 1+ edema to BLE and BUE, abrasions to left elbow and shoulder, knees  Neurological: A&Ox3.   Psychological: Appropriate mood and behavior.    Diagnostics   Results for orders placed or performed during the hospital encounter of 09/24/24 (from the past 24 hour(s))   POCT GLUCOSE   Result Value Ref Range    POCT Glucose 186 (H) 74 - 99 mg/dL   POCT GLUCOSE   Result Value Ref Range    POCT Glucose 113 (H) 74 - 99 mg/dL   POCT GLUCOSE   Result Value Ref Range    POCT Glucose 110 (H) 74 - 99 mg/dL   CBC and Auto Differential   Result Value Ref Range    WBC 3.4 (L) 4.4 - 11.3 x10*3/uL    nRBC 0.0 0.0 - 0.0 /100 WBCs    RBC " 4.04 (L) 4.50 - 5.90 x10*6/uL    Hemoglobin 11.2 (L) 13.5 - 17.5 g/dL    Hematocrit 33.9 (L) 41.0 - 52.0 %    MCV 84 80 - 100 fL    MCH 27.7 26.0 - 34.0 pg    MCHC 33.0 32.0 - 36.0 g/dL    RDW 13.6 11.5 - 14.5 %    Platelets 124 (L) 150 - 450 x10*3/uL    Immature Granulocytes %, Automated 1.8 (H) 0.0 - 0.9 %    Immature Granulocytes Absolute, Automated 0.06 0.00 - 0.50 x10*3/uL   Renal Function Panel   Result Value Ref Range    Glucose 111 (H) 74 - 99 mg/dL    Sodium 138 136 - 145 mmol/L    Potassium 4.3 3.5 - 5.3 mmol/L    Chloride 103 98 - 107 mmol/L    Bicarbonate 29 21 - 32 mmol/L    Anion Gap 10 10 - 20 mmol/L    Urea Nitrogen 14 6 - 23 mg/dL    Creatinine 1.05 0.50 - 1.30 mg/dL    eGFR 74 >60 mL/min/1.73m*2    Calcium 9.0 8.6 - 10.6 mg/dL    Phosphorus 3.8 2.5 - 4.9 mg/dL    Albumin 3.1 (L) 3.4 - 5.0 g/dL   Magnesium   Result Value Ref Range    Magnesium 2.25 1.60 - 2.40 mg/dL   Creatine Kinase   Result Value Ref Range    Creatine Kinase 168 0 - 325 U/L   Manual Differential   Result Value Ref Range    Neutrophils %, Manual 52.2 40.0 - 80.0 %    Lymphocytes %, Manual 29.9 13.0 - 44.0 %    Monocytes %, Manual 11.1 2.0 - 10.0 %    Eosinophils %, Manual 5.1 0.0 - 6.0 %    Basophils %, Manual 1.7 0.0 - 2.0 %    Seg Neutrophils Absolute, Manual 1.77 1.60 - 5.00 x10*3/uL    Lymphocytes Absolute, Manual 1.02 0.80 - 3.00 x10*3/uL    Monocytes Absolute, Manual 0.38 0.05 - 0.80 x10*3/uL    Eosinophils Absolute, Manual 0.17 0.00 - 0.40 x10*3/uL    Basophils Absolute, Manual 0.06 0.00 - 0.10 x10*3/uL    Total Cells Counted 117     RBC Morphology See Below     Ovalocytes Few     Ruth Cells Few    POCT GLUCOSE   Result Value Ref Range    POCT Glucose 114 (H) 74 - 99 mg/dL     Current Medications  Scheduled medications  [Held by provider] aspirin, 81 mg, oral, Daily  [Held by provider] enoxaparin, 40 mg, subcutaneous, q24h  insulin lispro, 0-5 Units, subcutaneous, TID  lidocaine, 1 patch, transdermal, Daily  losartan, 25 mg,  oral, Daily  melatonin, 10 mg, oral, Nightly  polyethylene glycol, 17 g, oral, Daily  risperiDONE, 2.5 mg, oral, Daily  sennosides-docusate sodium, 2 tablet, oral, BID  sulfamethoxazole-trimethoprim, 2 tablet, oral, BID  tamsulosin, 0.4 mg, oral, Daily  traZODone, 100 mg, oral, Nightly      Continuous medications       PRN medications  PRN medications: acetaminophen, dextrose, dextrose, glucagon, glucagon, ketorolac, oxyCODONE     Assessment  Seven Robin is a 74 y.o. male who presented to [facility] on 9/24/2024 as a full trauma activation for mechanical fall with head strike and deformity of the skull after falling from table and remaining on the ground for approximately 12 hours. Reports he was reaching for something and was standing on a table when he fell backwards. His roommate found him in the morning. Reports no loss of consciousness and can recall the fall.  Extensive imaging in the ED with no acute findings aside from scalp wound.  He has remained alert and oriented x 3 with no focal deficits for the duration of his hospitalization.  Had lactic acidosis and rhabdo, improving s/p fluids. Occipital wound with necrosis, wound care was consulted and recommended daily saline irrigation and xeroform dressing with Mepilex border dressing. Plastic Surgery was consulted for management of occipital wound.     Plan/Recommendations  S/p Scalp wound debridement 10/1  - Maintain dressing to scalp: xeroform, abd, kerlix, and ace wrap (if needed)       · Please inform plastics surgery if dressing is removed or if there is strikethrough bleeding   - Continue antibiotics per primary and follow up intraoperative cultures from wound  · 1+ mixed gram negative bacteria (final)  - Offload pressure off of occipital wound  - Recommend nutrition optimization for wound healing, albumin and prealbumin monitoring, nutrition supplements Q meal  - Recommend tight glycemic control  - Plan to return to OR in 2-3 weeks for skin graft pending  surgeon and OR availability  - Remainder of care per primary team  - Plastics will continue to follow. OK for discharge from plastics perspective, outpatient follow up scheduled for 10/16    ROBERTA Gallardo-CNP  Plastic and Reconstructive Surgery   [place]  Pager #[phone]  Team phone: [phone]

## 2024-10-04 NOTE — PROGRESS NOTES
Transitional Care Coordination Progress Note:  Patient discussed during interdisciplinary rounds.  Team members present: MD, DEB  Plan per Medical/Surgical team: Per attending pt is medically ready (as of 10/4) for discharge to SNF.  Payer: [employer]  Status: Inpatient  Discharge disposition: [facility]  Potential Barriers: none  ADOD: 10/4  Per  precert (submitted by direct submit team) to [facility] is pending as of 10/4. Care coordinator will continue to follow for discharge planning needs.     Brad Mendez RN  Transitional Care Coordinator (TCC)  [phone] or [phone]

## 2024-10-04 NOTE — PROGRESS NOTES
Seven Robin is a 74 y.o. male on day 10 of admission presenting with Fall, initial encounter.    JOHN attempted to call pt's brother, Danny to follow up regarding [facility]. Danny did not answer, JOHN left a vm.    SW will follow up.    UPDATE 10:53  JOHN met at bedside with pt to discuss SNF. JOHN asked pt if [facility] was okay and he agreed. JOHN told pt his brother wanted to verify this SNF was okay with him. JOHN told the pt she would keep him updated on it, he understood.    JOHN asked for  precert team to submit.    UPDATE 12:30  JOHN completed HENS 7000 for pt.  precert team informed JOHN that pt's precert is pending.  JOHN notified Victorino Perez, SNF, of this and will continue to follow.    SW to follow.  Fany Perez MSW [facility]  Care Transitions  2  [phone] or Epic Secure Chat     friend

## 2024-10-05 VITALS
WEIGHT: 208 LBS | TEMPERATURE: 97 F | RESPIRATION RATE: 18 BRPM | BODY MASS INDEX: 29.12 KG/M2 | HEART RATE: 55 BPM | HEIGHT: 71 IN | OXYGEN SATURATION: 96 % | SYSTOLIC BLOOD PRESSURE: 140 MMHG | TEMPERATURE: 97 F | BODY MASS INDEX: 29.12 KG/M2 | WEIGHT: 208 LBS | HEIGHT: 71 IN | SYSTOLIC BLOOD PRESSURE: 140 MMHG | HEART RATE: 55 BPM | DIASTOLIC BLOOD PRESSURE: 66 MMHG | RESPIRATION RATE: 18 BRPM | DIASTOLIC BLOOD PRESSURE: 66 MMHG | OXYGEN SATURATION: 96 %

## 2024-10-05 LAB
ALBUMIN SERPL BCP-MCNC: 3.4 G/DL (ref 3.4–5)
ALBUMIN SERPL BCP-MCNC: 3.4 G/DL (ref 3.4–5)
ANION GAP SERPL CALC-SCNC: 13 MMOL/L (ref 10–20)
ANION GAP SERPL CALC-SCNC: 13 MMOL/L (ref 10–20)
BACTERIA BLD CULT: NORMAL
BACTERIA BLD CULT: NORMAL
BASOPHILS # BLD AUTO: 0.03 X10*3/UL (ref 0–0.1)
BASOPHILS # BLD AUTO: 0.03 X10*3/UL (ref 0–0.1)
BASOPHILS NFR BLD AUTO: 0.9 %
BASOPHILS NFR BLD AUTO: 0.9 %
BUN SERPL-MCNC: 13 MG/DL (ref 6–23)
BUN SERPL-MCNC: 13 MG/DL (ref 6–23)
CALCIUM SERPL-MCNC: 9 MG/DL (ref 8.6–10.6)
CALCIUM SERPL-MCNC: 9 MG/DL (ref 8.6–10.6)
CHLORIDE SERPL-SCNC: 103 MMOL/L (ref 98–107)
CHLORIDE SERPL-SCNC: 103 MMOL/L (ref 98–107)
CK SERPL-CCNC: 141 U/L (ref 0–325)
CK SERPL-CCNC: 141 U/L (ref 0–325)
CO2 SERPL-SCNC: 22 MMOL/L (ref 21–32)
CO2 SERPL-SCNC: 22 MMOL/L (ref 21–32)
CREAT SERPL-MCNC: 1.07 MG/DL (ref 0.5–1.3)
CREAT SERPL-MCNC: 1.07 MG/DL (ref 0.5–1.3)
EGFRCR SERPLBLD CKD-EPI 2021: 73 ML/MIN/1.73M*2
EGFRCR SERPLBLD CKD-EPI 2021: 73 ML/MIN/1.73M*2
EOSINOPHIL # BLD AUTO: 0.18 X10*3/UL (ref 0–0.4)
EOSINOPHIL # BLD AUTO: 0.18 X10*3/UL (ref 0–0.4)
EOSINOPHIL NFR BLD AUTO: 5.3 %
EOSINOPHIL NFR BLD AUTO: 5.3 %
ERYTHROCYTE [DISTWIDTH] IN BLOOD BY AUTOMATED COUNT: 14.1 % (ref 11.5–14.5)
ERYTHROCYTE [DISTWIDTH] IN BLOOD BY AUTOMATED COUNT: 14.1 % (ref 11.5–14.5)
GLUCOSE BLD MANUAL STRIP-MCNC: 123 MG/DL (ref 74–99)
GLUCOSE BLD MANUAL STRIP-MCNC: 123 MG/DL (ref 74–99)
GLUCOSE SERPL-MCNC: 113 MG/DL (ref 74–99)
GLUCOSE SERPL-MCNC: 113 MG/DL (ref 74–99)
HCT VFR BLD AUTO: 36 % (ref 41–52)
HCT VFR BLD AUTO: 36 % (ref 41–52)
HGB BLD-MCNC: 11 G/DL (ref 13.5–17.5)
HGB BLD-MCNC: 11 G/DL (ref 13.5–17.5)
IMM GRANULOCYTES # BLD AUTO: 0.02 X10*3/UL (ref 0–0.5)
IMM GRANULOCYTES # BLD AUTO: 0.02 X10*3/UL (ref 0–0.5)
IMM GRANULOCYTES NFR BLD AUTO: 0.6 % (ref 0–0.9)
IMM GRANULOCYTES NFR BLD AUTO: 0.6 % (ref 0–0.9)
LYMPHOCYTES # BLD AUTO: 1.11 X10*3/UL (ref 0.8–3)
LYMPHOCYTES # BLD AUTO: 1.11 X10*3/UL (ref 0.8–3)
LYMPHOCYTES NFR BLD AUTO: 32.5 %
LYMPHOCYTES NFR BLD AUTO: 32.5 %
MAGNESIUM SERPL-MCNC: 2.48 MG/DL (ref 1.6–2.4)
MAGNESIUM SERPL-MCNC: 2.48 MG/DL (ref 1.6–2.4)
MCH RBC QN AUTO: 27.7 PG (ref 26–34)
MCH RBC QN AUTO: 27.7 PG (ref 26–34)
MCHC RBC AUTO-ENTMCNC: 30.6 G/DL (ref 32–36)
MCHC RBC AUTO-ENTMCNC: 30.6 G/DL (ref 32–36)
MCV RBC AUTO: 91 FL (ref 80–100)
MCV RBC AUTO: 91 FL (ref 80–100)
MONOCYTES # BLD AUTO: 0.28 X10*3/UL (ref 0.05–0.8)
MONOCYTES # BLD AUTO: 0.28 X10*3/UL (ref 0.05–0.8)
MONOCYTES NFR BLD AUTO: 8.2 %
MONOCYTES NFR BLD AUTO: 8.2 %
NEUTROPHILS # BLD AUTO: 1.8 X10*3/UL (ref 1.6–5.5)
NEUTROPHILS # BLD AUTO: 1.8 X10*3/UL (ref 1.6–5.5)
NEUTROPHILS NFR BLD AUTO: 52.5 %
NEUTROPHILS NFR BLD AUTO: 52.5 %
NRBC BLD-RTO: 0 /100 WBCS (ref 0–0)
NRBC BLD-RTO: 0 /100 WBCS (ref 0–0)
PHOSPHATE SERPL-MCNC: 3.6 MG/DL (ref 2.5–4.9)
PHOSPHATE SERPL-MCNC: 3.6 MG/DL (ref 2.5–4.9)
PLATELET # BLD AUTO: 216 X10*3/UL (ref 150–450)
PLATELET # BLD AUTO: 216 X10*3/UL (ref 150–450)
POTASSIUM SERPL-SCNC: 4.4 MMOL/L (ref 3.5–5.3)
POTASSIUM SERPL-SCNC: 4.4 MMOL/L (ref 3.5–5.3)
RBC # BLD AUTO: 3.97 X10*6/UL (ref 4.5–5.9)
RBC # BLD AUTO: 3.97 X10*6/UL (ref 4.5–5.9)
SODIUM SERPL-SCNC: 134 MMOL/L (ref 136–145)
SODIUM SERPL-SCNC: 134 MMOL/L (ref 136–145)
WBC # BLD AUTO: 3.4 X10*3/UL (ref 4.4–11.3)
WBC # BLD AUTO: 3.4 X10*3/UL (ref 4.4–11.3)

## 2024-10-05 PROCEDURE — 2500000001 HC RX 250 WO HCPCS SELF ADMINISTERED DRUGS (ALT 637 FOR MEDICARE OP): Performed by: INTERNAL MEDICINE

## 2024-10-05 PROCEDURE — 80069 RENAL FUNCTION PANEL: CPT | Performed by: INTERNAL MEDICINE

## 2024-10-05 PROCEDURE — 2500000001 HC RX 250 WO HCPCS SELF ADMINISTERED DRUGS (ALT 637 FOR MEDICARE OP): Performed by: STUDENT IN AN ORGANIZED HEALTH CARE EDUCATION/TRAINING PROGRAM

## 2024-10-05 PROCEDURE — 83735 ASSAY OF MAGNESIUM: CPT | Performed by: INTERNAL MEDICINE

## 2024-10-05 PROCEDURE — 2500000002 HC RX 250 W HCPCS SELF ADMINISTERED DRUGS (ALT 637 FOR MEDICARE OP, ALT 636 FOR OP/ED): Performed by: INTERNAL MEDICINE

## 2024-10-05 PROCEDURE — 99239 HOSP IP/OBS DSCHRG MGMT >30: CPT | Performed by: INTERNAL MEDICINE

## 2024-10-05 PROCEDURE — 82550 ASSAY OF CK (CPK): CPT | Performed by: INTERNAL MEDICINE

## 2024-10-05 PROCEDURE — 85025 COMPLETE CBC W/AUTO DIFF WBC: CPT | Performed by: INTERNAL MEDICINE

## 2024-10-05 PROCEDURE — 36415 COLL VENOUS BLD VENIPUNCTURE: CPT | Performed by: INTERNAL MEDICINE

## 2024-10-05 PROCEDURE — 2500000004 HC RX 250 GENERAL PHARMACY W/ HCPCS (ALT 636 FOR OP/ED): Performed by: INTERNAL MEDICINE

## 2024-10-05 PROCEDURE — 82947 ASSAY GLUCOSE BLOOD QUANT: CPT

## 2024-10-05 RX ORDER — OXYCODONE HYDROCHLORIDE 5 MG/1
5 TABLET ORAL EVERY 4 HOURS PRN
Qty: 30 TABLET | Refills: 0 | Status: SHIPPED | OUTPATIENT
Start: 2024-10-05

## 2024-10-05 RX ORDER — HYDROXYZINE HYDROCHLORIDE 25 MG/1
25 TABLET, FILM COATED ORAL ONCE
Status: COMPLETED | OUTPATIENT
Start: 2024-10-05 | End: 2024-10-05

## 2024-10-05 ASSESSMENT — PAIN SCALES - GENERAL
PAINLEVEL_OUTOF10: 5 - MODERATE PAIN
PAINLEVEL_OUTOF10: 0 - NO PAIN

## 2024-10-05 ASSESSMENT — COGNITIVE AND FUNCTIONAL STATUS - GENERAL
PERSONAL GROOMING: A LITTLE
DAILY ACTIVITIY SCORE: 17
MOVING TO AND FROM BED TO CHAIR: A LOT
CLIMB 3 TO 5 STEPS WITH RAILING: TOTAL
DRESSING REGULAR LOWER BODY CLOTHING: A LOT
HELP NEEDED FOR BATHING: A LITTLE
MOBILITY SCORE: 13
EATING MEALS: A LITTLE
MOVING FROM LYING ON BACK TO SITTING ON SIDE OF FLAT BED WITH BEDRAILS: A LITTLE
TOILETING: A LITTLE
TURNING FROM BACK TO SIDE WHILE IN FLAT BAD: A LITTLE
STANDING UP FROM CHAIR USING ARMS: A LOT
WALKING IN HOSPITAL ROOM: A LOT
DRESSING REGULAR UPPER BODY CLOTHING: A LITTLE

## 2024-10-05 ASSESSMENT — PAIN - FUNCTIONAL ASSESSMENT: PAIN_FUNCTIONAL_ASSESSMENT: 0-10

## 2024-10-05 ASSESSMENT — PAIN DESCRIPTION - ORIENTATION: ORIENTATION: MID

## 2024-10-05 ASSESSMENT — PAIN DESCRIPTION - LOCATION: LOCATION: HEAD

## 2024-10-05 NOTE — CARE PLAN
The clinical goals for the shift include pt to be free from pain during shift    Problem: Pain - Adult  Goal: Verbalizes/displays adequate comfort level or baseline comfort level  Outcome: Progressing     Problem: Safety - Adult  Goal: Free from fall injury  Outcome: Progressing     Problem: Discharge Planning  Goal: Discharge to home or other facility with appropriate resources  Outcome: Progressing     Problem: Chronic Conditions and Co-morbidities  Goal: Patient's chronic conditions and co-morbidity symptoms are monitored and maintained or improved  Outcome: Progressing     Problem: Pain  Goal: Takes deep breaths with improved pain control throughout the shift  Outcome: Progressing  Goal: Turns in bed with improved pain control throughout the shift  Outcome: Progressing  Goal: Walks with improved pain control throughout the shift  Outcome: Progressing  Goal: Performs ADL's with improved pain control throughout shift  Outcome: Progressing  Goal: Participates in PT with improved pain control throughout the shift  Outcome: Progressing  Goal: Free from opioid side effects throughout the shift  Outcome: Progressing  Goal: Free from acute confusion related to pain meds throughout the shift  Outcome: Progressing     Problem: Fall/Injury  Goal: Not fall by end of shift  Outcome: Progressing  Goal: Be free from injury by end of the shift  Outcome: Progressing  Goal: Verbalize understanding of personal risk factors for fall in the hospital  Outcome: Progressing  Goal: Verbalize understanding of risk factor reduction measures to prevent injury from fall in the home  Outcome: Progressing  Goal: Use assistive devices by end of the shift  Outcome: Progressing  Goal: Pace activities to prevent fatigue by end of the shift  Outcome: Progressing     Problem: Skin  Goal: Decreased wound size/increased tissue granulation at next dressing change  Outcome: Progressing  Flowsheets (Taken 10/5/2024 1234)  Decreased wound size/increased  tissue granulation at next dressing change: Promote sleep for wound healing  Goal: Participates in plan/prevention/treatment measures  Outcome: Progressing  Flowsheets (Taken 10/5/2024 1234)  Participates in plan/prevention/treatment measures:   Discuss with provider PT/OT consult   Elevate heels  Goal: Prevent/manage excess moisture  Outcome: Progressing  Flowsheets (Taken 10/5/2024 1234)  Prevent/manage excess moisture: Monitor for/manage infection if present  Goal: Prevent/minimize sheer/friction injuries  Outcome: Progressing  Flowsheets (Taken 10/5/2024 1234)  Prevent/minimize sheer/friction injuries:   Use pull sheet   HOB 30 degrees or less   Turn/reposition every 2 hours/use positioning/transfer devices   Increase activity/out of bed for meals  Goal: Promote/optimize nutrition  Outcome: Progressing  Flowsheets (Taken 10/5/2024 1234)  Promote/optimize nutrition: Monitor/record intake including meals  Goal: Promote skin healing  Outcome: Progressing  Flowsheets (Taken 10/5/2024 1234)  Promote skin healing:   Turn/reposition every 2 hours/use positioning/transfer devices   Ensure correct size (line/device) and apply per  instructions   Protective dressings over bony prominences   Assess skin/pad under line(s)/device(s)     Problem: Constipation  Goal: I will have a regular, well-formed bowel movement every 2-3 days  Outcome: Progressing

## 2024-10-05 NOTE — PROGRESS NOTES
Discharge Transfer to Facility  Patient will discharge today to: SNF  Facility name: [facility]  Facility phone number: [phone]   [facility] aware.  Bedside nurse (name) aware to call report: Christina  Phone number for report: [phone]   Ambulance transport has been arranged.  Ambulance Company name: [facility]  [facility] Company phone number: [phone]   time: 2pm  Patient and brother Danny [phone] aware of transport time.  Patient has been approved for discharge after 8pm: n/a  Direct submit team confirmed precert to [facility] SNF has been approved. Unit secretary provided with blue transfer slip. [place] sent to facility via [facility]. Attending and facility updated on transport time. Care coordinator will continue to follow for discharge planning needs.    Brad Mendez RN  Transitional Care Coordinator (TCC)   [phone] or [phone]

## 2024-10-05 NOTE — CARE PLAN
The patient's goals for the shift include have decreased pain    The clinical goals for the shift include patient will remain safe and free from injury entire shift    Problem: Pain - Adult  Goal: Verbalizes/displays adequate comfort level or baseline comfort level  10/4/2024 2219 by Jessica Ahn RN  Outcome: Progressing  10/4/2024 2219 by Jessica Ahn RN  Outcome: Progressing     Problem: Safety - Adult  Goal: Free from fall injury  10/4/2024 2219 by Jessica Ahn RN  Outcome: Progressing  10/4/2024 2219 by Jessica Ahn RN  Outcome: Progressing     Problem: Discharge Planning  Goal: Discharge to home or other facility with appropriate resources  10/4/2024 2219 by Jessica Ahn RN  Outcome: Progressing  10/4/2024 2219 by Jessica Ahn RN  Outcome: Progressing     Problem: Chronic Conditions and Co-morbidities  Goal: Patient's chronic conditions and co-morbidity symptoms are monitored and maintained or improved  10/4/2024 2219 by Jessica Ahn RN  Outcome: Progressing  10/4/2024 2219 by Jessica Ahn RN  Outcome: Progressing     Problem: Pain  Goal: Takes deep breaths with improved pain control throughout the shift  10/4/2024 2219 by Jessica Ahn RN  Outcome: Progressing  10/4/2024 2219 by Jessica Ahn RN  Outcome: Progressing  Goal: Turns in bed with improved pain control throughout the shift  10/4/2024 2219 by Jessica Ahn RN  Outcome: Progressing  10/4/2024 2219 by Jessica Ahn RN  Outcome: Progressing  Goal: Walks with improved pain control throughout the shift  10/4/2024 2219 by Jessica Ahn RN  Outcome: Progressing  10/4/2024 2219 by Jessica Ahn RN  Outcome: Progressing  Goal: Performs ADL's with improved pain control throughout shift  10/4/2024 2219 by Jessica Ahn RN  Outcome: Progressing  10/4/2024 2219 by Jessica Ahn RN  Outcome: Progressing  Goal: Participates in PT with improved pain control throughout the shift  10/4/2024 2219 by Jessica Ahn RN  Outcome: Progressing  10/4/2024 2219 by Jessica Ahn  RN  Outcome: Progressing  Goal: Free from opioid side effects throughout the shift  10/4/2024 2219 by Jessica Ahn RN  Outcome: Progressing  10/4/2024 2219 by Jessica Ahn RN  Outcome: Progressing  Goal: Free from acute confusion related to pain meds throughout the shift  10/4/2024 2219 by Jessica Ahn RN  Outcome: Progressing  10/4/2024 2219 by Jessica Ahn RN  Outcome: Progressing     Problem: Fall/Injury  Goal: Not fall by end of shift  10/4/2024 2219 by Jessica Ahn RN  Outcome: Progressing  10/4/2024 2219 by Jessica Ahn RN  Outcome: Progressing  Goal: Be free from injury by end of the shift  10/4/2024 2219 by Jessica Ahn RN  Outcome: Progressing  10/4/2024 2219 by Jessica Ahn RN  Outcome: Progressing  Goal: Verbalize understanding of personal risk factors for fall in the hospital  10/4/2024 2219 by Jessica Ahn RN  Outcome: Progressing  10/4/2024 2219 by Jessica Ahn RN  Outcome: Progressing  Goal: Verbalize understanding of risk factor reduction measures to prevent injury from fall in the home  10/4/2024 2219 by Jessica Ahn RN  Outcome: Progressing  10/4/2024 2219 by Jessica Ahn RN  Outcome: Progressing  Goal: Use assistive devices by end of the shift  10/4/2024 2219 by Jessica Ahn RN  Outcome: Progressing  10/4/2024 2219 by Jessica Ahn RN  Outcome: Progressing  Goal: Pace activities to prevent fatigue by end of the shift  10/4/2024 2219 by Jessica Ahn RN  Outcome: Progressing  10/4/2024 2219 by Jessica Ahn RN  Outcome: Progressing     Problem: Skin  Goal: Decreased wound size/increased tissue granulation at next dressing change  10/4/2024 2219 by Jessica Ahn RN  Outcome: Progressing  10/4/2024 2219 by Jessica Ahn RN  Outcome: Progressing  Goal: Participates in plan/prevention/treatment measures  10/4/2024 2219 by Jessica Ahn RN  Outcome: Progressing  10/4/2024 2219 by Jessica Ahn RN  Outcome: Progressing  Goal: Prevent/manage excess moisture  10/4/2024 2219 by Jessica Ahn RN  Outcome:  Progressing  10/4/2024 2219 by Jessica Ahn RN  Outcome: Progressing  Goal: Prevent/minimize sheer/friction injuries  10/4/2024 2219 by Jessica Ahn RN  Outcome: Progressing  10/4/2024 2219 by Jessica Ahn RN  Outcome: Progressing  Goal: Promote/optimize nutrition  10/4/2024 2219 by Jessica Ahn RN  Outcome: Progressing  10/4/2024 2219 by Jessica Ahn RN  Outcome: Progressing  Goal: Promote skin healing  10/4/2024 2219 by Jessica Ahn RN  Outcome: Progressing  10/4/2024 2219 by Jessica Ahn RN  Outcome: Progressing     Problem: Constipation  Goal: I will have a regular, well-formed bowel movement every 2-3 days  Outcome: Progressing     Over the shift, the patient did not make progress toward the following goals. Barriers to progression include ***. Recommendations to address these barriers include ***.

## 2024-10-05 NOTE — PROGRESS NOTES
"  Department of Plastic and Reconstructive Surgery  Progress Note    Patient Name: Seven Robin  MRN: [id number]  Date:  10/05/24     Subjective   No acute concerns. Scalp wound dressing intact, no strikethrough bleeding or drainage noted. Patient experiencing some discomfort to scalp surgical site which he rates 4/10, improves with analgesics. Denies fever, chills, headache, dizziness, chest pain, palpitations, dyspnea, abdominal pain, nausea or vomiting.    Objective   Vital Signs  /66   Pulse 55   Temp 36.1 °C (97 °F)   Resp 18   Ht 1.803 m (5' 11\")   Wt 94.3 kg (208 lb)   SpO2 96%   BMI 29.01 kg/m²      Physical Exam   Constitutional: Calm and cooperative, NAD.  Eyes: PERRL, EOMI.   ENMT: Moist mucous membranes, no apparent injuries or lesions. Small lacerations on left lower lip and left side of chin, appear to be healing, scabbing on chin wounds, mild lower lip edema.  Head/Neck: Occipital scalp wound with dressing in place which is without strikethrough bleeding or drainage, appropriately compressive.   Cardiovascular: Normal rate and regular rhythm. 2+ equal pulses of the distal extremities.  Respiratory/Thorax: Regular respirations on NC. Good symmetric chest expansion.   Gastrointestinal: Abdomen soft, NTND  Genitourinary: Voiding independently via urinal.  Extremities: Generalized weakness, 1+ edema to BLE and BUE, abrasions to left elbow and shoulder, knees.  Neurological: A&Ox3.   Psychological: Appropriate mood and behavior.    Diagnostics   Results for orders placed or performed during the hospital encounter of 09/24/24 (from the past 24 hour(s))   POCT GLUCOSE   Result Value Ref Range    POCT Glucose 129 (H) 74 - 99 mg/dL   POCT GLUCOSE   Result Value Ref Range    POCT Glucose 127 (H) 74 - 99 mg/dL   POCT GLUCOSE   Result Value Ref Range    POCT Glucose 108 (H) 74 - 99 mg/dL   CBC and Auto Differential   Result Value Ref Range    WBC 3.4 (L) 4.4 - 11.3 x10*3/uL    nRBC 0.0 0.0 - 0.0 /100 " WBCs    RBC 3.97 (L) 4.50 - 5.90 x10*6/uL    Hemoglobin 11.0 (L) 13.5 - 17.5 g/dL    Hematocrit 36.0 (L) 41.0 - 52.0 %    MCV 91 80 - 100 fL    MCH 27.7 26.0 - 34.0 pg    MCHC 30.6 (L) 32.0 - 36.0 g/dL    RDW 14.1 11.5 - 14.5 %    Platelets 216 150 - 450 x10*3/uL    Neutrophils % 52.5 40.0 - 80.0 %    Immature Granulocytes %, Automated 0.6 0.0 - 0.9 %    Lymphocytes % 32.5 13.0 - 44.0 %    Monocytes % 8.2 2.0 - 10.0 %    Eosinophils % 5.3 0.0 - 6.0 %    Basophils % 0.9 0.0 - 2.0 %    Neutrophils Absolute 1.80 1.60 - 5.50 x10*3/uL    Immature Granulocytes Absolute, Automated 0.02 0.00 - 0.50 x10*3/uL    Lymphocytes Absolute 1.11 0.80 - 3.00 x10*3/uL    Monocytes Absolute 0.28 0.05 - 0.80 x10*3/uL    Eosinophils Absolute 0.18 0.00 - 0.40 x10*3/uL    Basophils Absolute 0.03 0.00 - 0.10 x10*3/uL   Renal Function Panel   Result Value Ref Range    Glucose 113 (H) 74 - 99 mg/dL    Sodium 134 (L) 136 - 145 mmol/L    Potassium 4.4 3.5 - 5.3 mmol/L    Chloride 103 98 - 107 mmol/L    Bicarbonate 22 21 - 32 mmol/L    Anion Gap 13 10 - 20 mmol/L    Urea Nitrogen 13 6 - 23 mg/dL    Creatinine 1.07 0.50 - 1.30 mg/dL    eGFR 73 >60 mL/min/1.73m*2    Calcium 9.0 8.6 - 10.6 mg/dL    Phosphorus 3.6 2.5 - 4.9 mg/dL    Albumin 3.4 3.4 - 5.0 g/dL   Magnesium   Result Value Ref Range    Magnesium 2.48 (H) 1.60 - 2.40 mg/dL   Creatine Kinase   Result Value Ref Range    Creatine Kinase 141 0 - 325 U/L   POCT GLUCOSE   Result Value Ref Range    POCT Glucose 123 (H) 74 - 99 mg/dL     Current Medications  Scheduled medications  [Held by provider] aspirin, 81 mg, oral, Daily  [Held by provider] enoxaparin, 40 mg, subcutaneous, q24h  insulin lispro, 0-5 Units, subcutaneous, TID  lidocaine, 1 patch, transdermal, Daily  losartan, 25 mg, oral, Daily  melatonin, 10 mg, oral, Nightly  polyethylene glycol, 17 g, oral, Daily  risperiDONE, 2.5 mg, oral, Daily  sennosides-docusate sodium, 2 tablet, oral, BID  sulfamethoxazole-trimethoprim, 2 tablet,  oral, BID  tamsulosin, 0.4 mg, oral, Daily  traZODone, 100 mg, oral, Nightly      Continuous medications       PRN medications  PRN medications: acetaminophen, dextrose, dextrose, glucagon, glucagon, ketorolac, oxyCODONE     Assessment   Seven Robin is a 74 y.o. male who presented to [facility] on 9/24/2024 as a full trauma activation for mechanical fall with head strike and deformity of the skull after falling from table and remaining on the ground for approximately 12 hours. Reports he was reaching for something and was standing on a table when he fell backwards. His roommate found him in the morning. Reports no loss of consciousness and can recall the fall.  Extensive imaging in the ED with no acute findings aside from scalp wound.  He has remained alert and oriented x 3 with no focal deficits for the duration of his hospitalization.  Had lactic acidosis and rhabdo, improving s/p fluids. Occipital wound with necrosis, wound care was consulted and recommended daily saline irrigation and xeroform dressing with Mepilex border dressing. Plastic Surgery was consulted for management of occipital wound.     Plan/Recommendations  - Maintain scalp dressing: xeroform, abd, kerlix, and ace wrap (if needed) until next dressing change per plastic surgery team on Monday 10/7  - Please inform plastic surgery team if dressing is removed or if there is strikethrough bleeding   - 10/1 intraoperative wound cultures with no organism growth (final 10/3)  - Continue antibiotics per primary (currently on PO Bactrim)   - Offload pressure off of occipital wound  - Recommend nutrition optimization for wound healing, albumin and prealbumin monitoring, nutrition supplements Q meal  - Recommend tight glycemic control  - Plan to return to OR in 2-3 weeks (likely as outpatient) for skin graft wound coverage pending OR scheduling   - OK for discharge from plastics perspective, outpatient follow up scheduled for 10/16  - Appreciate remaining  supportive care per primary service  - Plastics will continue to follow while in house     Selma Abernathy PA-C  Plastic and Reconstructive Surgery   [place]  Pager #[phone]  Team phone: [phone]

## 2024-10-05 NOTE — DISCHARGE SUMMARY
Discharge Diagnosis  Mechanical fall, initial encounter  Large posterior scalp unstageable traumatic injury/eschar/hematoma with superimposed wound infection  Subdural hematoma  Enterobacter cloacae bacteremia  Rhabdomyolysis    Issues Requiring Follow-Up  Patient needs to follow-up with plastic surgery in 2 to 3 weeks to discuss skin graft.  Plastic surgery has made a scheduled appointment for follow-up on 10/16.    Discharge Meds     Medication List      START taking these medications     acetaminophen 325 mg tablet; Commonly known as: Tylenol; Take 2 tablets   (650 mg) by mouth every 8 hours if needed (pain or fever).   lidocaine 4 % patch; Place 1 patch over 12 hours on the skin once daily.   Remove & discard patch within 12 hours or as directed by MD. Apply to   back.   losartan 25 mg tablet; Commonly known as: Cozaar; Take 1 tablet (25 mg)   by mouth once daily.   melatonin 10 mg tablet; Take 1 tablet (10 mg) by mouth once daily at   bedtime.   oxyCODONE 5 mg immediate release tablet; Commonly known as: Roxicodone;   Take 1 tablet (5 mg) by mouth every 4 hours if needed for severe pain (7 -   10).   polyethylene glycol 17 gram packet; Commonly known as: Glycolax,   Miralax; Take 17 g by mouth once daily.   sennosides-docusate sodium 8.6-50 mg tablet; Commonly known as:   Diana-Colace; Take 2 tablets by mouth 2 times a day.   sulfamethoxazole-trimethoprim 800-160 mg tablet; Commonly known as:   Bactrim DS; Take 2 tablets by mouth 2 times a day for 7 days. End date   10/9/24.   tamsulosin 0.4 mg 24 hr capsule; Commonly known as: Flomax; Take 1   capsule (0.4 mg) by mouth once daily.     CHANGE how you take these medications     risperiDONE 0.5 mg tablet; Commonly known as: RisperDAL; Take 5 tablets   (2.5 mg) by mouth once daily.; What changed: medication strength, how much   to take   traZODone 100 mg tablet; Commonly known as: Desyrel; Take 1 tablet (100   mg) by mouth once daily at bedtime.; What changed: how  much to take     CONTINUE taking these medications     latanoprost 0.005 % ophthalmic solution; Commonly known as: Xalatan   lovastatin 40 mg tablet; Commonly known as: Mevacor   metFORMIN  mg 24 hr tablet; Commonly known as: Glucophage-XR   propranolol 20 mg tablet; Commonly known as: Inderal     STOP taking these medications     triamterene-hydrochlorothiazid 37.5-25 mg capsule; Commonly known as:   Dyazide       Test Results Pending At Discharge      Hospital Course  Seven Robin is a 74 y.o. male with PMHx of HTN, HLD, DM, cirrhosis 2/2 treated Hep C (Tx [id number]), BPH, schizoaffective disorder and prostate CA s/p EBRT presenting to the ED s/p mechanical fall leading to trauma activation.  Patient's power had gone out and he was checking out his breaker box and was standing on a table when he lost his balance and fell backwards.  Patient sustained a scalp laceration, imaging negative for fractures bleed patient noted to have lactic acidosis and rhabdomyolysis.  With IV fluids rhabdomyolysis has significantly improved and resolved.  MRI obtained due to facial asymmetry, speech changes, vision change more likely related to facial trauma and swelling, showed 3mm SDH hematoma, now improving and do not appear to have neuro deficits. Neurosurg consulted, appears to be seen on CT but not in report, stable.  No further imaging or follow-up recommended per neurosurgery.   Aspirin appears to be for prophylactic treatment and not for anything specific, continue holding asa and chemical prophylaxis.  Plastics and ent consulted, s/p debridement of scalp on 10/1.  Blood cultures from 9/29 2/4 bottles growing gram-negative bacilli, eventually grew out Enterobacter cloacae.  Per plastic surgery no growth from operative wound culture.  Repeat blood culture from 10/1 no growth to date.  PT/OT saw the patient and recommended skilled nursing facility, patient and family in agreement.       Assessment/Plan  Mechanical  Fall  Large posterior scalp unstagable pressure injury/eschar/hematoma  SDH, 3mm read as subacute to chronic  -no bleed or fractures identified, trauma team evaluated and signed off.  -MRI obtained due to facial asymmetry, speech changes, vision change more likely related to facial trauma and swelling, showed 3mm SDH hematoma, now improving and do not appear to have been neuro deficits. Neurosurg consulted, appears to be seen on CT but not in report, stable. Holding asa and dvt px also in setting of scalp and subdural hematoma/pressure injury.    -Plastics and ent consulted, s/p debridement of scalp on 10/1.   -Neurosurgery had seen for subdural hematoma, recommended to hold aspirin until post bleed day #5 and to hold any prophylaxis until post bleed day #2.  Looking at his medical history it is not clear that aspirin was given for a specific medical condition, may have been for preventative treatment.  Will continue to hold aspirin for now.  -Wound cultures collected on 9/27. Prelim  mixed Gram-Positive and Gram-Negative Bacteria  -pt/ot recommending moderate intensity.  TCC aware.  -Plastic surgery following.  Plastic surgery indicates preliminary intraoperative cultures appears to be growing Enterobacter cloacae.  Plastic surgery recommending return to the OR in 2 to 3 weeks for skin graft. Offload pressure off of occipital wound. Current wound care recs from plastic surgery: Non adherent telfa to be placed on the integra that is stapled to the wound. followed by fluffs, cover with ABD pad. Use kerlix wrap to secure dressing with chin strap technique and use ACE wrap as well to wrap around chin. Dressing to be changed three times a week until follow up with Dr. Danielle.   -Continue limited Tylenol as needed.    Oxycodone 5 mg every 4 hours as needed for severe pain.     Enterobacter cloacae bacteremia  -Patient had been on IV Zosyn, switched to oral Bactrim DS 2 tablets twice a day.  On 10/2.  Plan for total 10  days from the date of debridement on 10/1. End date 10/11.  -Blood cultures x2 growing Enterobacter cloacae.  Sensitivities reviewed.  -Repeat blood culture 10/1 no growth to date.  -Likely source of infection is the scalp laceration and injury, source control done with debridement 10/1.     Rhabdomyolysis   -CK elevated to 3,593, since then with IV fluids CPK has improved down to 377.  -Resolved.     Hyponatremia  -Likely secondary to fluids  -Improved, last sodium level 135, only mildly low at this time.  Monitor as needed.     troponin elevation  -mild uptrend initially then downtrend  -no angina or equivalents  -low concern for acs, likely related to demand  -denies chest pain or equivalents     HTN   HLD  -At home on lovastatin, triamterene-hydroCHLOROthiazide 37.5-25 mg daily, and ASA 81mg   -Continue holding ASA 81 mg daily. This appears to be mostly preventative treatment and in the setting of subdural hematoma would not resume for now.  -Here in the hospital we were not using his Maxide.  Started patient on losartan and will continue.    -Systolic blood pressure 120's - 140s.     DM type 2  -A1c: 6.5  -Resume home Metformin 500mg BID      Schizoaffective disorder  MDD   insomnia   -On propanolol 10 mg, Risperidone 2.5 mg tablet and trazodone 100 mg tablet   -C/w Risperidone 2.5 mg and Trazodone 100mg      BPH  -C/w Tamsulosin      Back pain  -Lidocaine patch.    Time spent caring for patient and coordinating discharge total 35 minutes.    Physical exam    General: Lying in bed without distress. Cooperative.  Skin: Laceration on scalp, currently covered in dressing with Kerlix.  HEENT: Sclera is white.  Mucous membranes moist.  Neck: Supple.  No JVD.  Cardiac: Regular rate and rhythm, S1/S2 normal.  Lungs: Clear to auscultation bilaterally, no wheezing or crackles, no accessory muscle use at rest.  Abdomen: Soft, nontender, nondistended, BS +  Extremities: No cyanosis.  No lower extremity edema.  Neurologic:  Alert and oriented x3.  No focal deficits.  Psychiatric: Appropriate mood and behavior.  Currently no agitation.      Outpatient Follow-Up  Future Appointments   Date Time Provider Department Center   10/16/2024  8:20 AM Sherrie Schuster PA-C [id number] Huitron MD

## 2024-10-05 NOTE — NURSING NOTE
4 eyes skin assessment completed on pt with Miranda Azul RN. Photo uploaded in chart. Wound dressing changes done per order. Pt stable and safely roomed   
DISCHARGE NOTE    An informational handout was provided to the patient which included instructions that addressed activity level, diet, discharge medications, follow-up appointments, wound care and what to do if symptoms worsen. Discharge instructions were reviewed with patient. Patient verbalized understanding of instructions and had no further questions. IV was intact upon removal. Patient to be discharged to [facility] via stretcher by [facility]  time is set for 2pm (however, pt was picked up at 1310). Nurse to nurse report was called and given to nurse Pham from [facility] (also provided her a call back number to the unit if she were to have any further questions). Wound care instructions were provided to Christina Pham RN  
Pt up to chair for medication pass and breakfast this am.    
none

## 2024-10-07 LAB
FUNGUS SPEC CULT: NORMAL
FUNGUS SPEC FUNGUS STN: NORMAL

## 2024-10-08 LAB
ATRIAL RATE: 82 BPM
P AXIS: 53 DEGREES
P OFFSET: 207 MS
P ONSET: 157 MS
PR INTERVAL: 132 MS
Q ONSET: 223 MS
QRS COUNT: 14 BEATS
QRS DURATION: 98 MS
QT INTERVAL: 374 MS
QTC CALCULATION(BAZETT): 436 MS
QTC FREDERICIA: 415 MS
R AXIS: 27 DEGREES
T AXIS: 49 DEGREES
T OFFSET: 410 MS
VENTRICULAR RATE: 82 BPM

## 2024-10-11 LAB
LABORATORY COMMENT REPORT: NORMAL
PATH REPORT.FINAL DX SPEC: NORMAL
PATH REPORT.GROSS SPEC: NORMAL
PATH REPORT.RELEVANT HX SPEC: NORMAL
PATH REPORT.TOTAL CANCER: NORMAL

## 2024-10-11 ASSESSMENT — ENCOUNTER SYMPTOMS: FEVER: 1

## 2024-10-15 LAB
FUNGUS SPEC CULT: NORMAL
FUNGUS SPEC FUNGUS STN: NORMAL

## 2024-10-16 ENCOUNTER — APPOINTMENT (OUTPATIENT)
Dept: PLASTIC SURGERY | Facility: CLINIC | Age: 74
End: 2024-10-16
Payer: COMMERCIAL

## 2024-10-16 VITALS
HEART RATE: 80 BPM | SYSTOLIC BLOOD PRESSURE: 122 MMHG | HEIGHT: 71 IN | DIASTOLIC BLOOD PRESSURE: 76 MMHG | BODY MASS INDEX: 29.12 KG/M2 | WEIGHT: 208 LBS

## 2024-10-16 DIAGNOSIS — S08.0XXD AVULSION OF SCALP, SUBSEQUENT ENCOUNTER: Primary | ICD-10-CM

## 2024-10-16 PROCEDURE — 4010F ACE/ARB THERAPY RXD/TAKEN: CPT | Performed by: PHYSICIAN ASSISTANT

## 2024-10-16 PROCEDURE — 1111F DSCHRG MED/CURRENT MED MERGE: CPT | Performed by: PHYSICIAN ASSISTANT

## 2024-10-16 PROCEDURE — 3074F SYST BP LT 130 MM HG: CPT | Performed by: PHYSICIAN ASSISTANT

## 2024-10-16 PROCEDURE — 3078F DIAST BP <80 MM HG: CPT | Performed by: PHYSICIAN ASSISTANT

## 2024-10-16 PROCEDURE — 3044F HG A1C LEVEL LT 7.0%: CPT | Performed by: PHYSICIAN ASSISTANT

## 2024-10-16 PROCEDURE — 1159F MED LIST DOCD IN RCRD: CPT | Performed by: PHYSICIAN ASSISTANT

## 2024-10-16 PROCEDURE — 99024 POSTOP FOLLOW-UP VISIT: CPT | Performed by: PHYSICIAN ASSISTANT

## 2024-10-16 PROCEDURE — 3060F POS MICROALBUMINURIA REV: CPT | Performed by: PHYSICIAN ASSISTANT

## 2024-10-16 NOTE — PROGRESS NOTES
Department of Plastic and Reconstructive Surgery            PostOperative Visit    Date: 10/16/24  Date of Surgery: 10/1/24    Subjective   Seven Robin is a 74 y.o. male who presents for POV. They are s/p excisional debridement of scalp skin, subcutaneous tissue, fat, galea 14x9cm and placement of Integra matrix on 10/1/24 with Dr. Danielle.       He presents today from facility for POV. He notes they are doing dressing changes every other day. He denied pain.     Objective   Vital Signs:   Vitals:    10/16/24 0919   BP: 122/76   Pulse: 80       Gen: interactive and pleasant  Head: NCAT  Eyes: EOMI, PERRLA  Mouth: MMM  Throat: trachea midline  Cor: RRR  Pulm: nonlabored breathing  Abd: s/nt/nd  Neuro: AAOx3  Ext: extremities perfused    Focused exam of the: scalp  Dressing removed. There is large 13x9cm  wound with integra in place. Staples are intact. There is no odor from wound. There is no evidence of purulent drainage. There is no concerns for seroma.     Assessment/Plan     Seven Robin is a 74 y.o. male  who presents for POV. They are s/p excisional debridement of scalp skin, subcutaneouns tissue, fat, galea 14x9cm and placement of Integra matrix on 10/1/24 with Dr. Danielle.       He presents for POV. Integra is in place on the scalp. There is no odor or concerns for infection. Continue with daily xeroform, ABD, and kerlix wrap. Will plan for split thickness skin graft in 2-4 weeks. Office to reach out to schedule patient for surgery.     Plan:   Split thickness skin graft in 2-4 weeks   Continue daily xeroform, ABD, and kerlix to the scalp wound.     I spent 20 minutes with this patient. Greater than 50% of this time was spent in the counselling and/or coordination of care of this patient.  This note was created using voice recognition software and was not corrected for typographical or grammatical errors.    Signature: Sherrie Haley PA-C

## 2024-10-17 ENCOUNTER — PREP FOR PROCEDURE (OUTPATIENT)
Dept: OCCUPATIONAL MEDICINE | Facility: HOSPITAL | Age: 74
End: 2024-10-17
Payer: COMMERCIAL

## 2024-10-17 DIAGNOSIS — S08.0XXD AVULSION OF SCALP, SUBSEQUENT ENCOUNTER: Primary | ICD-10-CM

## 2024-10-23 NOTE — PREPROCEDURE INSTRUCTIONS
Current Medications   Medication Instructions    acetaminophen (Tylenol) 325 mg tablet Ok to use if needed with small sip of water    latanoprost (Xalatan) 0.005 % ophthalmic solution {Takes at HS    losartan (Cozaar) 25 mg tablet Hold    lovastatin (Mevacor) 40 mg tablet Take in pop    melatonin 10 mg tablet Takes at HS    metFORMIN XR (Glucophage-XR) 500 mg 24 hr tablet HOLD    oxyCODONE (Roxicodone) 5 mg immediate release tablet Hold    polyethylene glycol (Glycolax, Miralax) 17 gram packet Hold    propranolol (Inderal) 20 mg tablet Take in am with small sip of water    risperiDONE (RisperDAL) 0.5 mg tablet  Dose=2.5mg Take in am with small sip of water    tamsulosin (Flomax) 0.4 mg 24 hr capsule Take in am with small sip of water    traZODone (Desyrel) 100 mg tablet Takes at HS          NPO Instructions:    Do not eat any food after midnight the night before your surgery/procedure.  You may have  13 ounces of clear liquids until TWO hours before surgery/procedure. This includes water, black tea/coffee, (no milk or cream) apple juice and electrolyte drinks (Gatorade).-No red drinks    Additional Instructions:   Arrival 8:30 for 10:30 surgery    Enter through main entrance of [facility], located at [address] [facility] [facility] [address]. Proceed to registration, located in the back right hand corner. You will need your ID and insurance card for registration. Please ensure you have a responsible adult to drive you home.     Take a shower the morning of or night before your procedure. After you shower avoid lotions, powders, deodorants or anything applied to the skin. If you wear contacts or glasses, wear the glasses. If you do not have glasses, please bring a case for your contacts. You may wear hearing aids and dentures, bring a case for them or we will provide one. Make sure you wear something loose and comfortable. Keep in mind your surgery type and wear something that will accommodate incisions or bandages. Please  remove all jewelry.      You may take medications discussed during phone call with a small sip of water.    For further questions Jessica ELLIS can be contacted at [phone] between 7AM-3PM.

## 2024-10-24 ENCOUNTER — HOSPITAL ENCOUNTER (OUTPATIENT)
Dept: CARDIOLOGY | Facility: HOSPITAL | Age: 74
Discharge: HOME | End: 2024-10-24
Payer: COMMERCIAL

## 2024-10-24 ENCOUNTER — ANESTHESIA (OUTPATIENT)
Dept: OPERATING ROOM | Facility: HOSPITAL | Age: 74
End: 2024-10-24
Payer: COMMERCIAL

## 2024-10-24 ENCOUNTER — ANESTHESIA EVENT (OUTPATIENT)
Dept: OPERATING ROOM | Facility: HOSPITAL | Age: 74
End: 2024-10-24
Payer: COMMERCIAL

## 2024-10-24 ENCOUNTER — PHARMACY VISIT (OUTPATIENT)
Dept: PHARMACY | Facility: CLINIC | Age: 74
End: 2024-10-24
Payer: COMMERCIAL

## 2024-10-24 ENCOUNTER — HOSPITAL ENCOUNTER (OUTPATIENT)
Facility: HOSPITAL | Age: 74
Setting detail: OUTPATIENT SURGERY
Discharge: HOME | End: 2024-10-24
Attending: SURGERY | Admitting: SURGERY
Payer: COMMERCIAL

## 2024-10-24 VITALS
OXYGEN SATURATION: 98 % | DIASTOLIC BLOOD PRESSURE: 76 MMHG | SYSTOLIC BLOOD PRESSURE: 161 MMHG | HEIGHT: 71 IN | HEART RATE: 65 BPM | WEIGHT: 207.89 LBS | TEMPERATURE: 96.8 F | BODY MASS INDEX: 29.1 KG/M2 | RESPIRATION RATE: 16 BRPM

## 2024-10-24 DIAGNOSIS — S08.0XXD AVULSION OF SCALP, SUBSEQUENT ENCOUNTER: Primary | ICD-10-CM

## 2024-10-24 DIAGNOSIS — G89.18 POST-OP PAIN: ICD-10-CM

## 2024-10-24 PROCEDURE — 3600000008 HC OR TIME - EACH INCREMENTAL 1 MINUTE - PROCEDURE LEVEL THREE: Performed by: SURGERY

## 2024-10-24 PROCEDURE — RXMED WILLOW AMBULATORY MEDICATION CHARGE

## 2024-10-24 PROCEDURE — 15004 WOUND PREP F/N/HF/G: CPT | Performed by: SURGERY

## 2024-10-24 PROCEDURE — 93005 ELECTROCARDIOGRAM TRACING: CPT

## 2024-10-24 PROCEDURE — 3600000003 HC OR TIME - INITIAL BASE CHARGE - PROCEDURE LEVEL THREE: Performed by: SURGERY

## 2024-10-24 PROCEDURE — 2500000004 HC RX 250 GENERAL PHARMACY W/ HCPCS (ALT 636 FOR OP/ED)

## 2024-10-24 PROCEDURE — 2720000007 HC OR 272 NO HCPCS: Performed by: SURGERY

## 2024-10-24 PROCEDURE — 15121 SPLT AGRFT F/S/N/H/F/G/M EA: CPT | Performed by: SURGERY

## 2024-10-24 PROCEDURE — 3700000001 HC GENERAL ANESTHESIA TIME - INITIAL BASE CHARGE: Performed by: SURGERY

## 2024-10-24 PROCEDURE — 87070 CULTURE OTHR SPECIMN AEROBIC: CPT | Mod: PARLAB | Performed by: PHYSICIAN ASSISTANT

## 2024-10-24 PROCEDURE — 2500000004 HC RX 250 GENERAL PHARMACY W/ HCPCS (ALT 636 FOR OP/ED): Performed by: SURGERY

## 2024-10-24 PROCEDURE — 3700000002 HC GENERAL ANESTHESIA TIME - EACH INCREMENTAL 1 MINUTE: Performed by: SURGERY

## 2024-10-24 PROCEDURE — 7100000002 HC RECOVERY ROOM TIME - EACH INCREMENTAL 1 MINUTE: Performed by: SURGERY

## 2024-10-24 PROCEDURE — 7100000010 HC PHASE TWO TIME - EACH INCREMENTAL 1 MINUTE: Performed by: SURGERY

## 2024-10-24 PROCEDURE — 7100000001 HC RECOVERY ROOM TIME - INITIAL BASE CHARGE: Performed by: SURGERY

## 2024-10-24 PROCEDURE — 15005 WND PREP F/N/HF/G ADDL CM: CPT | Performed by: SURGERY

## 2024-10-24 PROCEDURE — 7100000009 HC PHASE TWO TIME - INITIAL BASE CHARGE: Performed by: SURGERY

## 2024-10-24 PROCEDURE — 15120 SPLT AGRFT F/S/N/H/F/G/M 1ST: CPT | Performed by: SURGERY

## 2024-10-24 RX ORDER — ONDANSETRON HYDROCHLORIDE 2 MG/ML
INJECTION, SOLUTION INTRAVENOUS AS NEEDED
Status: DISCONTINUED | OUTPATIENT
Start: 2024-10-24 | End: 2024-10-24

## 2024-10-24 RX ORDER — PROPOFOL 10 MG/ML
INJECTION, EMULSION INTRAVENOUS AS NEEDED
Status: DISCONTINUED | OUTPATIENT
Start: 2024-10-24 | End: 2024-10-24

## 2024-10-24 RX ORDER — CEFAZOLIN 1 G/1
INJECTION, POWDER, FOR SOLUTION INTRAVENOUS AS NEEDED
Status: DISCONTINUED | OUTPATIENT
Start: 2024-10-24 | End: 2024-10-24

## 2024-10-24 RX ORDER — EPINEPHRINE 1 MG/ML
INJECTION, SOLUTION, CONCENTRATE INTRAVENOUS AS NEEDED
Status: DISCONTINUED | OUTPATIENT
Start: 2024-10-24 | End: 2024-10-24 | Stop reason: HOSPADM

## 2024-10-24 RX ORDER — LIDOCAINE HYDROCHLORIDE AND EPINEPHRINE 10; 10 MG/ML; UG/ML
INJECTION, SOLUTION INFILTRATION; PERINEURAL AS NEEDED
Status: DISCONTINUED | OUTPATIENT
Start: 2024-10-24 | End: 2024-10-24 | Stop reason: HOSPADM

## 2024-10-24 RX ORDER — SODIUM CHLORIDE, SODIUM LACTATE, POTASSIUM CHLORIDE, CALCIUM CHLORIDE 600; 310; 30; 20 MG/100ML; MG/100ML; MG/100ML; MG/100ML
INJECTION, SOLUTION INTRAVENOUS CONTINUOUS PRN
Status: DISCONTINUED | OUTPATIENT
Start: 2024-10-24 | End: 2024-10-24

## 2024-10-24 RX ORDER — HYDROCODONE BITARTRATE AND ACETAMINOPHEN 5; 325 MG/1; MG/1
1 TABLET ORAL EVERY 6 HOURS PRN
Qty: 20 TABLET | Refills: 0 | Status: SHIPPED | OUTPATIENT
Start: 2024-10-24

## 2024-10-24 RX ORDER — LIDOCAINE HCL/PF 100 MG/5ML
SYRINGE (ML) INTRAVENOUS AS NEEDED
Status: DISCONTINUED | OUTPATIENT
Start: 2024-10-24 | End: 2024-10-24

## 2024-10-24 RX ORDER — FENTANYL CITRATE 50 UG/ML
INJECTION, SOLUTION INTRAMUSCULAR; INTRAVENOUS AS NEEDED
Status: DISCONTINUED | OUTPATIENT
Start: 2024-10-24 | End: 2024-10-24

## 2024-10-24 RX ORDER — ROCURONIUM BROMIDE 10 MG/ML
INJECTION, SOLUTION INTRAVENOUS AS NEEDED
Status: DISCONTINUED | OUTPATIENT
Start: 2024-10-24 | End: 2024-10-24

## 2024-10-24 RX ORDER — PHENYLEPHRINE HCL IN 0.9% NACL 1 MG/10 ML
SYRINGE (ML) INTRAVENOUS AS NEEDED
Status: DISCONTINUED | OUTPATIENT
Start: 2024-10-24 | End: 2024-10-24

## 2024-10-24 SDOH — HEALTH STABILITY: MENTAL HEALTH: CURRENT SMOKER: 0

## 2024-10-24 ASSESSMENT — PAIN - FUNCTIONAL ASSESSMENT
PAIN_FUNCTIONAL_ASSESSMENT: 0-10
PAIN_FUNCTIONAL_ASSESSMENT: UNABLE TO SELF-REPORT
PAIN_FUNCTIONAL_ASSESSMENT: 0-10

## 2024-10-24 ASSESSMENT — PAIN SCALES - GENERAL
PAINLEVEL_OUTOF10: 0 - NO PAIN
PAINLEVEL_OUTOF10: 4
PAINLEVEL_OUTOF10: 0 - NO PAIN
PAIN_LEVEL: 0

## 2024-10-24 NOTE — OP NOTE
Plastic Surgery Operative Note       Date: 10/24/2024  OR Location: [facility]    Name: Seven Robin, : 1950, Age: 74 y.o., MRN: [id number], Sex: male    Diagnosis  Pre-op Diagnosis      * Avulsion of scalp, subsequent encounter [S08.0XXD] Post-op Diagnosis     * Avulsion of scalp, subsequent encounter [S08.0XXD]     Procedures  1.  Excision of open wound of scalp, 12 x 9 cm  2.  Split-thickness skin graft to scalp 12 x 9 cm  3.  Application of VAC bolster    Surgeons      * Sherwin Danielle - Primary    Resident/Fellow/Other Assistant:  Surgeons and Role:  * No surgeons found with a matching role *    Procedure Summary  Anesthesia: General  ASA: III  Anesthesia Staff: Anesthesiologist: Eliezer Justice MD  CRNA: ROBERTA Bagley-CRNA  Estimated Blood Loss: 20mL  Intra-op Medications:   Administrations occurring from 1030 to 1230 on 10/24/24:   Medication Name Total Dose   lidocaine-epinephrine (Xylocaine W/EPI) 1 %-1:100,000 injection 14 mL   EPINEPHrine HCl (PF) (Adrenalin) injection 1 mg   ceFAZolin (Ancef) vial 1 g 2 g   fentaNYL (Sublimaze) injection 50 mcg/mL 150 mcg   LR infusion Cannot be calculated   lidocaine (cardiac) injection 2% prefilled syringe 60 mg   ondansetron (Zofran) 2 mg/mL injection 4 mg   phenylephrine 100 mcg/mL syringe 10 mL (prefilled) 700 mcg   propofol (Diprivan) injection 10 mg/mL 200 mg   rocuronium (ZeMuron) 50 mg/5 mL injection 50 mg   sugammadex (Bridion) 200 mg/2 mL injection 200 mg              Anesthesia Record               Intraprocedure I/O Totals          Intake    LR infusion 800.00 mL    Total Intake 800 mL          Specimen:   ID Type Source Tests Collected by Time   A : HEAD WOUND CULTURE SWAB Swab ABSCESS TISSUE/WOUND CULTURE/SMEAR Sherwin Danielle MD 10/24/2024 1146        Staff:   Circulator: Fredis  Circulator: Lars             Findings: Excellent granulation tissue of scalp wound    Indications: Seven Robin is an 74 y.o. male who is having surgery for  Avulsion of scalp, subsequent encounter [S08.0XXD]. Seven Robin is a 74 y.o. male who presents for POV. They are s/p excisional debridement of scalp skin, subcutaneous tissue, fat, galea 14x9cm and placement of Integra matrix on 10/1/24.  He returns today for planned return to the operating room for split-thickness skin graft to the scalp.    INFORMED CONSENT  Patient was told the risks, benefits, INDICATIONS, contraindications, and alternatives to the procedure. Risks include but not limited to pain, infection, bleeding, hematoma, seroma, injury to neurovascular and tendinous structures, partial flap loss, complete flap loss, alopecia at the skin graft, and need for subsequent surgeries.      The patient demonstrated understanding of these risks and agreed to proceed with surgery.  Advance directives discussed.  Team approach explained.       The patient consented and wished to proceed with the procedure and for medical photography if needed.     PROCEDURAL PAUSE  Prior to the beginning of the procedure, the patient's correct identity, side, site, and procedure to be performed were verified.  The patient was given intravenous antibiotics prior to skin incision.     PROCEDURAL NOTE  Seven was brought to the operative theater at which point he was transferred to the operating room table.  All bony prominences were well padded, and sequential compression devices were placed to each lower extremity. Patient was then secured with safety straps.  The patient was then placed under IV sedation, and our anesthesia colleagues administered general tracheal esthesia.    The Integra was removed from the vertex of the scalp, and the right thigh and scalp were prepped and draped in standard sterile fashion.    We began by performing excisional debridement and preparation for application of skin graft to the scalp using Misonix ultrasonic debrider, we noted excellent punctate bleeding throughout the wound bed.  The wound bed  measures 12 x 9 cm = 108 cm²  We then obtained a dermatome, 3 inch guard, and harvested a split-thickness skin graft from the right thigh.  The dermatome was set to 1/12,000 of an inch.  We then performed 3: 1 meshing of the skin graft.  We then inset the skin graft onto the scalp with a running 4-0 chromic suture.  We then placed a VAC bolster onto the split-thickness skin graft with DuoDERM surrounding the edges of the wound in order to aid obtain a seal on the scalp.    We placed Xeroform over the donor site followed by staples at 4 corners, Telfa, ABD, 6 inch Ace.     The patient tolerated the procedure well and was awakened from anesthesia without any difficulties, he was transferred to the PACU in stable condition, all needle counts were correct.     POST OP PLAN:  Seven will remain outpatient.  He will have a follow-up in 1 week to remove the VAC bolster.      Sherwin Danielle  Phone Number: [phone]

## 2024-10-24 NOTE — ANESTHESIA POSTPROCEDURE EVALUATION
Patient: Seven Robin    Procedure Summary       Date: 10/24/24 Room / Location: [facility] 08 / [facility]    Anesthesia Start: 1105 Anesthesia Stop: 1243    Procedures:       OPEN WOUND EXCISION WITH PREPARATION/ RECIPIENT SITE OF TORSO OR EXTREMITY EXCLUDING HANDS & FEET (Right: Thigh - Leg Upper)      SCALP SKIN GRAFT (Head)      WOUND VAC APPLICATION (Head) Diagnosis:       Avulsion of scalp, subsequent encounter      (Avulsion of scalp, subsequent encounter [S08.0XXD])    Surgeons: Sherwin Danielle MD Responsible Provider: Eliezer Justice MD    Anesthesia Type: general ASA Status: 3            Anesthesia Type: general    Vitals Value Taken Time   /74 10/24/24 1241   Temp 36.0 10/24/24 1243   Pulse 76 10/24/24 1242   Resp 16 10/24/24 1243   SpO2 100 % 10/24/24 1242   Vitals shown include unfiled device data.    Anesthesia Post Evaluation    Patient location during evaluation: PACU  Patient participation: complete - patient participated  Level of consciousness: awake and alert  Pain score: 0  Pain management: satisfactory to patient  Airway patency: patent  Cardiovascular status: acceptable and hemodynamically stable  Respiratory status: acceptable, face mask, nonlabored ventilation and spontaneous ventilation  Hydration status: acceptable  Postoperative Nausea and Vomiting: none      There were no known notable events for this encounter.

## 2024-10-24 NOTE — ANESTHESIA PREPROCEDURE EVALUATION
Patient: Seven Robin    Procedure Information       Date/Time: 10/24/24 1030    Procedures:       OPEN WOUND EXCISION WITH PREPARATION/ RECIPIENT SITE OF TORSO OR EXTREMITY EXCLUDING HANDS & FEET      SCALP SKIN GRAFT - scalp skin graft      WOUND VAC APPLICATION    Location: [facility] 08 / [facility]    Surgeons: Sherwin Danielle MD            Relevant Problems   Anesthesia (within normal limits)      Cardiac   (+) HTN (hypertension)   (+) Hyperlipidemia      /Renal   (+) Prostate cancer (Multi)      Liver   (+) Cirrhosis (Multi)   (+) Hepatitis C      Endocrine   (+) Diabetes mellitus, type 2 (Multi)      ID   (+) Hepatitis C       Clinical information reviewed:    Allergies  Meds               NPO Detail:  NPO/Void Status  Carbohydrate Drink Given Prior to Surgery? : N  Date of Last Liquid: 10/23/24  Time of Last Liquid: 1900  Date of Last Solid: 10/23/24  Time of Last Solid: 1900         Physical Exam    Airway  Mallampati: III  TM distance: >3 FB  Neck ROM: full     Cardiovascular - normal exam     Dental        Pulmonary - normal exam     Abdominal - normal exam             Anesthesia Plan    History of general anesthesia?: yes  History of complications of general anesthesia?: no    ASA 3     general     The patient is not a current smoker.    intravenous induction   Postoperative administration of opioids is intended.  Trial extubation is planned.  Anesthetic plan and risks discussed with patient.    Plan discussed with CRNA.

## 2024-10-24 NOTE — ANESTHESIA PROCEDURE NOTES
Airway  Date/Time: 10/24/2024 11:18 AM  Urgency: elective    Airway not difficult    Staffing  Performed: attending   Authorized by: Eliezer Justice MD    Performed by: TALISHA Bagley  Patient location during procedure: OR    Indications and Patient Condition  Indications for airway management: anesthesia  Spontaneous Ventilation: absent  Sedation level: deep  Preoxygenated: yes  Patient position: sniffing  MILS maintained throughout  Mask difficulty assessment: 1 - vent by mask  Planned trial extubation    Final Airway Details  Final airway type: endotracheal airway      Successful airway: ETT  Cuffed: yes   Successful intubation technique: video laryngoscopy  Facilitating devices/methods: intubating stylet  Endotracheal tube insertion site: oral  Blade: Sruthi  Blade size: #4  ETT size (mm): 7.5  Cormack-Lehane Classification: grade I - full view of glottis  Placement verified by: chest auscultation and capnometry   Cuff volume (mL): 10  Measured from: teeth  ETT to teeth (cm): 22  Number of attempts at approach: 1  Ventilation between attempts: none  Number of other approaches attempted: 0

## 2024-10-25 LAB
ATRIAL RATE: 55 BPM
P AXIS: 62 DEGREES
P OFFSET: 204 MS
P ONSET: 148 MS
PR INTERVAL: 154 MS
Q ONSET: 225 MS
QRS COUNT: 9 BEATS
QRS DURATION: 102 MS
QT INTERVAL: 428 MS
QTC CALCULATION(BAZETT): 409 MS
QTC FREDERICIA: 415 MS
R AXIS: -12 DEGREES
T AXIS: 6 DEGREES
T OFFSET: 439 MS
VENTRICULAR RATE: 55 BPM

## 2024-10-27 LAB
BACTERIA SPEC CULT: NORMAL
GRAM STN SPEC: NORMAL
GRAM STN SPEC: NORMAL

## 2024-10-30 ENCOUNTER — APPOINTMENT (OUTPATIENT)
Dept: PLASTIC SURGERY | Facility: CLINIC | Age: 74
End: 2024-10-30
Payer: COMMERCIAL

## 2024-10-30 VITALS
DIASTOLIC BLOOD PRESSURE: 88 MMHG | BODY MASS INDEX: 28.98 KG/M2 | SYSTOLIC BLOOD PRESSURE: 150 MMHG | HEIGHT: 71 IN | WEIGHT: 207 LBS | HEART RATE: 76 BPM

## 2024-10-30 DIAGNOSIS — S08.0XXD AVULSION OF SCALP, SUBSEQUENT ENCOUNTER: Primary | ICD-10-CM

## 2024-12-04 ENCOUNTER — APPOINTMENT (OUTPATIENT)
Dept: PLASTIC SURGERY | Facility: CLINIC | Age: 74
End: 2024-12-04
Payer: COMMERCIAL

## 2024-12-04 VITALS
WEIGHT: 207 LBS | SYSTOLIC BLOOD PRESSURE: 150 MMHG | DIASTOLIC BLOOD PRESSURE: 77 MMHG | RESPIRATION RATE: 20 BRPM | HEART RATE: 99 BPM | BODY MASS INDEX: 28.87 KG/M2

## 2024-12-04 DIAGNOSIS — S08.0XXD AVULSION OF SCALP, SUBSEQUENT ENCOUNTER: Primary | ICD-10-CM

## 2024-12-04 ASSESSMENT — PAIN SCALES - GENERAL: PAINLEVEL_OUTOF10: 2

## 2024-12-04 NOTE — PROGRESS NOTES
Department of Plastic and Reconstructive Surgery            PostOperative Visit    Date: 12/04/24  Date of Surgery: 10/1/24    Subjective   Seven Robin is a 74 y.o. male who presents for POV. They are s/p excisional debridement of scalp skin, subcutaneous tissue, fat, galea 14x9cm and placement of Integra matrix on 10/1/24 with Dr. Danielle.  He is now s/p STSG to the scalp on 10/24/24.     He presents today from facility for POV. He endorses they have been doing daily dressing changes to the scalp STSG daily. He endorsed dressing fell off the right thigh.     Objective   Vital Signs:   There were no vitals filed for this visit.      Gen: interactive and pleasant  Head: NCAT  Eyes: EOMI, PERRLA  Mouth: MMM  Throat: trachea midline  Cor: RRR  Pulm: nonlabored breathing  Abd: s/nt/nd  Neuro: AAOx3  Ext: extremities perfused    Focused exam of the: scalp  STSG measuring 12x9cm is intact with 100% graft take. There is no delayed wound healing present.    Thigh: STSG donor site with eschar and xeroform removed. There is bright red skin present. No concerns for infection.     Assessment/Plan     Seven Robin is a 74 y.o. male  who presents for POV. They are s/p excisional debridement of scalp skin, subcutaneouns tissue, fat, galea 14x9cm and placement of Integra matrix on 10/1/24 with Dr. Danielle.  He is now s/p STSG to the scalp on 10/24/24.     He presents for POV. Scalp STSG with 100% graft take. There are no areas of delayed wound healing. He may discontinue dressings at this time, ok for daily aquaphor to both the scalp and right thigh. No further plastic surgery intervention at this time, follow up as needed.       Plan:   Recommend Aquaphor to the scalp and right thigh daily   No further dressings required for skin graft.   No further follow up at this time, follow up PRN    I spent 10 minutes with this patient. Greater than 50% of this time was spent in the counselling and/or coordination  of care of this patient.  This note was created using voice recognition software and was not corrected for typographical or grammatical errors.    Signature: Sherrie Haley PA-C

## (undated) DEVICE — SPONGE, LAP, XRAY DECT, 18IN X 18IN, W/LOOP, STERILE

## (undated) DEVICE — CORD, CAUTERY, BIOPOLAR FORCEP, 12FT

## (undated) DEVICE — DRESSING, GAUZE, SUPER, KERLIX, 7.75 X 8.75 IN, BULK, NS

## (undated) DEVICE — Device

## (undated) DEVICE — DRAPE, SHEET, THREE QUARTER, FAN FOLD, 57 X 77 IN

## (undated) DEVICE — DRESSING, GAUZE, WASHED FLUFF, LARGE, STERILE

## (undated) DEVICE — PACK, UNIVERSAL

## (undated) DEVICE — BANDAGE, ELASTIC, ACE, SELF-CLOSURE, 6 IN X 5 YD, STERILE

## (undated) DEVICE — DRESSING, GAUZE, PETROLATUM, XEROFORM, 5 X 9 IN, STERILE

## (undated) DEVICE — BLADE, DERMATOME, 1.25 X 4.25 IN, STERILE

## (undated) DEVICE — DRESSING, ISLAND, TELFA, 4 X 5 IN

## (undated) DEVICE — BANDAGE, GAUZE, 6 PLY, KERLIX, 3.5 IN X 3.5 YD, STERILE

## (undated) DEVICE — APPLICATOR, CHLORAPREP, W/ORANGE TINT, 26ML

## (undated) DEVICE — DRESSING, WOUND, MEPITEL, 4X8

## (undated) DEVICE — STAPLER, SKIN, MULTIFIRE, PREMIUM, WIDE, 35 W

## (undated) DEVICE — NEEDLE, HYPODERMIC, SAFETY, GLIDE, 18G X 1.5"

## (undated) DEVICE — TUBESET, SONICVAC

## (undated) DEVICE — PREP TRAY, SKIN, DRY, W/GLOVES

## (undated) DEVICE — MANIFOLD, 4 PORT NEPTUNE STANDARD

## (undated) DEVICE — TOWEL, SURGICAL, NEURO, O/R, 16 X 26, BLUE, STERILE

## (undated) DEVICE — WOUND SYSTEM, DEBRIDEMENT & CLEANING, O.R DUOPAK

## (undated) DEVICE — BANDAGE, ELASTIC, ACE, SELF-CLOSURE, 4 IN X 5 YD, NONSTERILE

## (undated) DEVICE — BANDAGE, GAUZE, CONFORMING, KERLIX, 6 PLY, 4.5 IN X 4.1 YD

## (undated) DEVICE — DRESSING, ABDOMINAL, TENDERSORB, 8 X 7-1/2 IN, STERILE

## (undated) DEVICE — DRESSING KIT, VACUUM ASSISTED CLOSURE, W/DRAPE/TUBING, LARGE, FOAM, BLACK

## (undated) DEVICE — BANDAGE, STRETCH, CONFORM, 3 IN X 4.1 YD, STERILE

## (undated) DEVICE — FORCEP, BIOPOLAR, ADSON, NON INSUL, 5IN 1.0MM

## (undated) DEVICE — DRAPE, SHEET, EXTREMITY, W/ARM BOARD COVERS, 87 X 106 X 128 IN, DISPOSABLE, LF, STERILE

## (undated) DEVICE — BANDAGE, ELASTIC, PREMIUM, SELF-CLOSE, 6 IN X 5.5 YD, STERILE

## (undated) DEVICE — PADDING, WEBRIL, UNDERCAST, STERILE, 4 IN

## (undated) DEVICE — DRESSING, GAUZE, SPONGE, 12 PLY, CURITY, 4 X 4 IN, RIGID TRAY, STERILE

## (undated) DEVICE — CARRIER, SKIN GRAFT, DERMACARRIER II, 3 X 8 IN, 1.5:1 RATIO, STERILE

## (undated) DEVICE — STAPLER, SKIN PROXIMATE, 35 WIDE